# Patient Record
Sex: FEMALE | Race: BLACK OR AFRICAN AMERICAN | Employment: FULL TIME | URBAN - METROPOLITAN AREA
[De-identification: names, ages, dates, MRNs, and addresses within clinical notes are randomized per-mention and may not be internally consistent; named-entity substitution may affect disease eponyms.]

---

## 2017-10-26 ENCOUNTER — OFFICE VISIT (OUTPATIENT)
Dept: FAMILY MEDICINE CLINIC | Age: 37
End: 2017-10-26

## 2017-10-26 VITALS
WEIGHT: 290 LBS | DIASTOLIC BLOOD PRESSURE: 73 MMHG | SYSTOLIC BLOOD PRESSURE: 137 MMHG | HEIGHT: 71 IN | TEMPERATURE: 97.6 F | OXYGEN SATURATION: 97 % | BODY MASS INDEX: 40.6 KG/M2 | HEART RATE: 64 BPM | RESPIRATION RATE: 19 BRPM

## 2017-10-26 DIAGNOSIS — Z01.419 WELL WOMAN EXAM: Primary | ICD-10-CM

## 2017-10-26 DIAGNOSIS — G89.29 CHRONIC PAIN OF LEFT KNEE: ICD-10-CM

## 2017-10-26 DIAGNOSIS — Z23 ENCOUNTER FOR IMMUNIZATION: ICD-10-CM

## 2017-10-26 DIAGNOSIS — L30.9 ECZEMA, UNSPECIFIED TYPE: ICD-10-CM

## 2017-10-26 DIAGNOSIS — D64.9 ANEMIA, UNSPECIFIED TYPE: ICD-10-CM

## 2017-10-26 DIAGNOSIS — Z00.00 LABORATORY EXAM ORDERED AS PART OF ROUTINE GENERAL MEDICAL EXAMINATION: ICD-10-CM

## 2017-10-26 DIAGNOSIS — R73.03 PREDIABETES: ICD-10-CM

## 2017-10-26 DIAGNOSIS — M25.562 CHRONIC PAIN OF LEFT KNEE: ICD-10-CM

## 2017-10-26 DIAGNOSIS — F32.A DEPRESSION, UNSPECIFIED DEPRESSION TYPE: ICD-10-CM

## 2017-10-26 DIAGNOSIS — K58.0 IRRITABLE BOWEL SYNDROME WITH DIARRHEA: ICD-10-CM

## 2017-10-26 RX ORDER — BUPROPION HYDROCHLORIDE 150 MG/1
150 TABLET ORAL
Qty: 30 TAB | Refills: 0 | Status: SHIPPED | OUTPATIENT
Start: 2017-10-26 | End: 2017-11-25 | Stop reason: SDUPTHER

## 2017-10-26 RX ORDER — DICLOFENAC SODIUM 10 MG/G
4 GEL TOPICAL 4 TIMES DAILY
Qty: 100 G | Refills: 2 | Status: SHIPPED | OUTPATIENT
Start: 2017-10-26 | End: 2020-02-28

## 2017-10-26 RX ORDER — BETAMETHASONE DIPROPIONATE 0.5 MG/G
OINTMENT TOPICAL 2 TIMES DAILY
Qty: 15 G | Refills: 0 | Status: SHIPPED | OUTPATIENT
Start: 2017-10-26 | End: 2020-03-05

## 2017-10-26 RX ORDER — METFORMIN HYDROCHLORIDE 500 MG/1
TABLET, EXTENDED RELEASE ORAL
Qty: 90 TAB | Refills: 0 | Status: SHIPPED | OUTPATIENT
Start: 2017-10-26 | End: 2017-11-29 | Stop reason: SDUPTHER

## 2017-10-26 RX ORDER — ESCITALOPRAM OXALATE 20 MG/1
20 TABLET ORAL DAILY
Qty: 30 TAB | Refills: 3 | Status: SHIPPED | OUTPATIENT
Start: 2017-10-26 | End: 2017-11-25 | Stop reason: ALTCHOICE

## 2017-10-26 RX ORDER — METFORMIN HYDROCHLORIDE 500 MG/1
TABLET, EXTENDED RELEASE ORAL
Refills: 0 | COMMUNITY
Start: 2017-09-20 | End: 2017-10-26 | Stop reason: SDUPTHER

## 2017-10-26 RX ORDER — ESCITALOPRAM OXALATE 20 MG/1
20 TABLET ORAL DAILY
COMMUNITY
End: 2017-10-26 | Stop reason: SDUPTHER

## 2017-10-26 NOTE — PROGRESS NOTES
S: Genie Vázquez is a 42 y.o. male who presents for establish care, CPE    Assessment/Plan:  1. Well woman exam  -discussed healthy diet and increasing daily exercise  -labs today: CBC, CMP, urinalysis, A1c, lipid, microalbumin  -flu vaccine today    2. Eczema, unspecified type  -bilateral hands; kenalog did not work to control   - Trial betamethasone; advised to use it max 10 days    3. Chronic pain of left knee  -discussed losing weight, can't take NSAIDs due to IBS  - trial diclofenac (VOLTAREN) 1 % gel    4. Prediabetes  -advised to try and increase metformin - titrate up to 2g slowly: add 500mg tab in am for 2 weeks, and if no GI sx, increase to 1,000mg in am  -refill metformin 5oomg (90/3)    5. Depression, unspecified depression type  -was taking 40mg lexapro; advised to take 20mg daily; refilled med (90/3)  -trial adding wellbutrin 150mg daily (30 day supply) for anxiety, smoking cessation     RTC 4-6 week for depression, smoking cessation, weight check       HPI:  On lexapro 20 mg for past 7 years, works well but did start taking 40mg bc depression/anxiety increasing  ran out of lexapro 6 days ago  Stressed about looking for a new job    Left knee - grinds and does swell at the end of the day  Has been taking motrin - has been hurting stomach    Dr. 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 - has had MRI done  Herniated discs in neck - shooting pain down left arm - has had cortisone inj and helps for about 6 months  Right hip - cracked on xray - [facility]    Social history:   Nutrition: battles with food but has IBS and tries to eat fiber;   Minimal pork, turkey, steak every now and then; veggies    is diabetic so carb counting and a   Physical: walking daily   Social: lives with , 2 daughters and new grandchild (4 weeks old girl) in house; son at [employer] in [place] (football); oldest son in [place] in [employer]   Occupation: was working for [employer] full time [employer] getting bachelor's - general studies   Tobacco: 1/2ppd - has quit in the past; has smoked for 12  Drug: none  Alcohol: once every other month - mixed drink or wine   Sexually Active: yes; needs to find more time - also hard to have intercourse with her hip and 's bad back    LMP: Hysterectomy     Review of Systems:  - Constitutional Symptoms: no fevers, chills, weight loss  - Eyes: no blurry vision or double vision  - Cardiovascular: no chest pain or palpitations  - Respiratory: no cough or shortness of breath  - Gastrointestinal: +  dysphagia, abdominal pain (IBS dx)  - Musculoskeletal: + joint pains, noweakness  - Integumentary: no rashes  - : no vaginal discharge, itching, dyspareunia  - Neurological: no numbness, tingling, or headaches  - Psychiatric: no depression or anxiety      PHQ over the last two weeks 10/26/2017   Little interest or pleasure in doing things Not at all   Feeling down, depressed or hopeless Not at all   Total Score PHQ 2 0       I reviewed the following:  History reviewed. No pertinent past medical history. Current Outpatient Prescriptions   Medication Sig Dispense Refill    metFORMIN ER (GLUCOPHAGE XR) 500 mg tablet TAKE 2 TABLETS BY MOUTH EVERY DAY WITH EVENING MEAL  0    escitalopram oxalate (LEXAPRO) 20 mg tablet Take 20 mg by mouth daily.          Allergies   Allergen Reactions    Pcn [Penicillins] Nausea and Vomiting    Sulfur Hives        Health Maintenance:  PAP: UTD  Colonoscopy: 4 years ago; q5y    O: VS:   Visit Vitals    /73 (BP 1 Location: Right arm, BP Patient Position: Sitting)    Pulse 64    Temp 97.6 °F (36.4 °C) (Oral)    Resp 19    Ht 5' 11\" (1.803 m)    Wt 290 lb (131.5 kg)    SpO2 97%    BMI 40.45 kg/m2       GENERAL: Kiara Taylor is in no acute distress. Non-toxic. Well nourished. Well developed. Appropriately groomed. HEAD:  Normocephalic. Atraumatic. Non tender sinuses x 4. EYE: PERRLA. EOMs intact. Sclera anicteric without injection. No drainage or discharge. EARS: Hearing intact bilaterally. External ear canals normal without evidence of blood or swelling. Bilateral TM's intact, pearly grey with landmarks visible. No erythema or effusion. NOSE: Patent. Nasal turbinates pink. No polyps noted. No erythema. No discharge. MOUTH: mucous membranes pink and moist. Posterior pharynx normal with cobblestone appearance. No erythema, white exudate or obstruction. NECK: supple. Midline trachea. No carotid bruits noted bilaterally. No thyromegaly noted. RESP: Breath sounds are symmetrical bilaterally. Unlabored without SOB. Speaking in full sentences. Clear to auscultation bilaterally anteriorly and posteriorly. No wheezes. No rales or rhonchi. CV: normal rate. Regular rhythm. S1, S2 audible. No murmur noted. No rubs, clicks or gallops noted. ABDOMEN: Flat without bulges or pulsations. Soft and nondistended. No tenderness on palpation. No masses or organomegaly. No rebound, rigidity or guarding. Bowel sounds normal x 4 quadrants. BACK: No visible deformities or curvature. Full ROM. No pain on palpation of the spinous processes in the cervical, thoracic, lumbar, sacral regions. No CVA tenderness. GYN: deferred  NEURO:  awake, alert and oriented to person, place, and time and event. Cranial nerves II through XII intact. Clear speech. Muscle strength is +5/5 x 4 extremities. Sensation is intact to light touch bilaterally. Steady gait. MUSC:  Intact x 4 extremities. Full ROM x 4 extremities. No pain with movement. HEME/LYMPH: peripheral pulses palpable 2+ x 4 extremities.   No peripheral edema is noted. No cervical adenopathy noted. SKIN: Skin is warm and dry. Turgor is normal. No petechiae, no purpura, no rash. No cyanosis. No mottling, jaundice or pallor. PSYCH: appropriate behavior, dress and thought processes. Good eye contact. Clear and coherent speech. Full affect. Good insight.   ______________________________________________________________________  Patient education was done. Advised on nutrition, physical activity, weight management, tobacco, alcohol and safety. Counseling included discussion of diagnosis, differentials, treatment options, prescribed treatment, warning signs and follow up. Medication risks/benefits,interactions and alternatives discussed with patient.      Patient verbalized understanding and agreed to plan of care. Patient was given an after visit summary which included current diagnoses, medications and vital signs. Follow up as directed.

## 2017-10-26 NOTE — PATIENT INSTRUCTIONS
Healthy Weight  Body Mass Index is a noninvasive way to screen for weight and body fat. This is a mathematical calculation based on your height and weight. A healthy BMI is between 20% -24.9%. Your BMI is 40 %. There is a relationship between high BMI and various healthy problems, such as osteoarthritis, muscle pain, increased risk of cancer, diabetes, heart disease, stroke, hypertension, high cholesterol, sleep apnea, breathing problems, depression, which is why weight loss is so important. In terms of weight loss, a 5-10% reduction in body weight over 3-6 months is a reasonable goal.  There would likely be improvements in risk for disease such as diabetes and heart disease, with this amount of weight loss. In order to lose weight, try reducing your daily intake of calories by decreasing portion size of food and increase exercise to help reduce weight. Eat 3-5 small meals per day instead of 3 large meals. Choose lean meats for protein source which include chicken, pork, and turkey. The recommended serving size for protein is a 2-3 oz serving (the size of your fist), and 1-1.5 oz of carbohydrate per meal (about 1 cup). Increase servings of fruits and vegetables. Limit processed carbohydrates, (i.e. most breads, crackers, pasta, chips, rice, breaded or battered food, etc). If you choose to eat carbohydrates, whole wheat, (instead of white) is a healthier option for bread, rice, and crackers. Avoid fried foods. Limit sugar and do not drink alcohol, juice, sodas or sweet teas. Drink plenty of water (at least 64 oz/day). Daily exercise will also help with weight loss and overall health. A minimum of 150 minutes a week of moderate exercise is recommended (30 minutes per day). Make exercise a routine part of your day - for example, park in spaces far away from [facility], take stairs instead of elevator, if sitting for long periods, get up from chair and walk every hour.     Recruit a friend or relative to exercise with you and keep you on schedule. It is much easier to exercise with a marc who will make sure you work out each day! RushFit is a eight week mixed martial arts (MMA) based workout. It has 5 main workout DVDs, each one is 45 minutes consisting of a 10 minute warm-up, five 5 minute workouts and a 6 minute stretching/cool down. It is easy to follow, with options to modify each move and you only need hand weights and some space to do the work out. Get 7-8 hours of sleep each night. You may wish to consider seeing the nutritionist at [facility] ([phone]), [facility] ([phone]) or [facility] ([phone]). For reliable dietary information, go to www. EATRIGHT.org.    Free smart phone application to help manage weight loss: MyFitnessPal = tracks food intake, exercise and weight. Daily nutritional summary. Meal        2) Yoga is a type of exercise in which you move your body into various positions in order to become more fit or flexible, to improve your breathing, and to relax your mind. It dates back over 5,000 years with roots in [address] [facility]. There are numerous styles of yoga, but overall, yoga combines meditation/relaxation, physical movements, and breathing techniques. According to the Professor Beb[employer], scientific research has shown yoga can reduce pain and improve function in people suffering from chronic lower back pain. Other studies also suggest that practicing yoga (as well as other forms of regular exercise) might improve quality of life; reduce stress; lower heart rate and blood pressure; help relieve anxiety, depression, and insomnia; and improve overall physical fitness, strength, and flexibility. Everyones body is different, and although yoga is low impact and safe for most people,  postures should be modified based on individual abilities.  People with high blood pressure, glaucoma, or sciatica, and women who are pregnant should modify or avoid some yoga poses. The movements of yoga are subtle, so it is a good idea to start with professional instructions to ensure you are doing it correctly. Carefully selecting an instructor who is experienced and attentive to your needs is an important step toward helping you practice yoga safely. There are many gyms and yoga studios in this area that offer free trial classes so sign up for one and explore! 3) Decreased Libido (Sex Drive)      Sex can still be very satisfying as you get older, but it can change in a number of ways. As men get older they might have less interest in sex, need more stimulation to get or stay erect, or be unable to get erect or have trouble reaching orgasm and orgasms might be less intense or satisfying than they were when they were younger. As women get older they might have less interest in sex, have trouble getting aroused or lubricated, have pain or discomfort during sex (this can happen because the vagina can become dry and thin after menopause), have trouble reaching orgasm or orgasms that are less intense or satisfying than they were when they were younger. There are many things that can lead to decreased interest or ability to have sex as you age. These include:  ? Lower hormones - As people age, their bodies makes less sex hormones. That's especially true for women who have gone through menopause (the time in a woman's life when she stops having monthly periods). But hormones change in men, too. In women, the decrease in hormones is more sudden, especially if they have had had their ovaries removed. ? Other medical conditions - Medical conditions, such as diabetes, heart disease, obesity, high blood pressure, and chronic pain, can decrease a person's interest in or ability to have sex. In men, diabetes is a major cause of problems with erections, because it damages nerves and blood vessels.   ?Past surgery or medical treatment - Men who have had surgery or radiation therapy to treat prostate cancer often develop problems with erections. Men who have certain treatments for an enlarged prostate can have problems with orgasm. Women who have had surgery to treat problems with their bladder or sex organs or treatment for cancer can also have problems with sex. ?Depression or anxiety - As people get older, they sometimes have problems with depression or anxiety. These conditions can also lead to problems with sex. ? Smoking in men - Men who smoke cigarettes are more likely than men who don't smoke to have problems with erections. ? Alcohol consumption can also cause issues with libido and sexual performance. ? Medicines - Some of the medicines older people take to control diabetes, high blood pressure, heart disease, depression, and other conditions can have side effects that cause problems with sex. ?Relationship problems - Sometimes partners have different ideas about sex and this can lead to problems. Try to stay relaxed about your situation and work around the problems that limit you. · Make an effort to have more fun together by having a regular \"date night  · Be open to trying new things and adapting. · Read books or websites about sex  · If you have a condition that causes pain or stiffness, such as arthritis, try to schedule sex at a time when you are at your best and your pain medicines are most effective. · If you are less flexible than you used to be, try different positions or ask your partner if he or she can help. · Use sex toys such as clitoris stimulators or cock rings to help with orgasm. · \"Outercourse\" - alternative methods of sexual intimacy such as extended caressing, mutual masturbation and massage instead of vaginal penetration. · Be open with your partner and explain what is going on with you. · Encourage your partner to seek treatment for any physical or sexual problems he or she might have.      4) Labs  The following blood work was ordered today. Once the tests are completed, you will receive a letter, email or phone call with the results. If you have not heard from us within 10-14 days, please call the office for the results. Hemoglobin A1c is a 3 month average of your blood sugar and used as a marker of your diabetes control. A normal value is less than 5.7%. Total Cholesterol is the total number of cholesterol particles in your blood, which includes triglycerides, HDL and LDL. A small amount of cholesterol is needed for the body's cells, hormones, and metabolism. Goal is less than 200. Triglycerides are the short term fats in your blood which are used for energy. Goal is less than 150. High Density Lipids (HDL) is the \"good\" cholesterol in your blood. HDL helps remove bad cholesterol from your blood. Goal is more than 40. Low Density Lipids (LDL) is the \"bad\" cholesterol in your blood. LDL can stick to your arteries, raising the risk for heart attack and stroke. Goal is less than 150    Complete Blood Count (CBC) helps evaluate your overall health, including hemoglobin and red blood cells that carry oxygen, white blood cells that fight infection and platelets that help with clotting. Complete Metabolic Panel (CMP) assesses kidney and liver function.  (A Basic Metabolic Panel (BMP) does not include liver function.)      BUN and creatinine are markers of kidney function. ALT and AST are markers of liver function. Urinalysis - this is a test of your urine to check your overall health. It is recommended as a part of a routine check up and screens for a variety of disorders, such as urinary tract infections, kidney disease and diabetes. Urine Analysis for Microalbumin/creatinine ratio is a marker of the amount of protein in your urine. Certain health conditions, such as diabetes and hypertension, can injury kidney function. A normal value is less than 30. Vaccine Information Statement    Influenza (Flu) Vaccine (Inactivated or Recombinant): What you need to know    Many Vaccine Information Statements are available in Italian and other languages. See www.immunize.org/vis  Hojas de Información Sobre Vacunas están disponibles en Español y en muchos otros idiomas. Visite www.immunize.org/vis    1. Why get vaccinated? Influenza (flu) is a contagious disease that spreads around the [place] every year, usually between October and May. Flu is caused by influenza viruses, and is spread mainly by coughing, sneezing, and close contact. Anyone can get flu. Flu strikes suddenly and can last several days. Symptoms vary by age, but can include:   fever/chills   sore throat   muscle aches   fatigue   cough   headache    runny or stuffy nose    Flu can also lead to pneumonia and blood infections, and cause diarrhea and seizures in children. If you have a medical condition, such as heart or lung disease, flu can make it worse. Flu is more dangerous for some people. Infants and young children, people 72years of age and older, pregnant women, and people with certain health conditions or a weakened immune system are at greatest risk. Each year thousands of people in the [facility] die from flu, and many more are hospitalized. Flu vaccine can:   keep you from getting flu,   make flu less severe if you do get it, and   keep you from spreading flu to your family and other people. 2. Inactivated and recombinant flu vaccines    A dose of flu vaccine is recommended every flu season. Children 6 months through 6years of age may need two doses during the same flu season. Everyone else needs only one dose each flu season.        Some inactivated flu vaccines contain a very small amount of a mercury-based preservative called thimerosal. Studies have not shown thimerosal in vaccines to be harmful, but flu vaccines that do not contain thimerosal are available. There is no live flu virus in flu shots. They cannot cause the flu. There are many flu viruses, and they are always changing. Each year a new flu vaccine is made to protect against three or four viruses that are likely to cause disease in the upcoming flu season. But even when the vaccine doesnt exactly match these viruses, it may still provide some protection    Flu vaccine cannot prevent:   flu that is caused by a virus not covered by the vaccine, or   illnesses that look like flu but are not. It takes about 2 weeks for protection to develop after vaccination, and protection lasts through the flu season. 3. Some people should not get this vaccine    Tell the person who is giving you the vaccine:     If you have any severe, life-threatening allergies. If you ever had a life-threatening allergic reaction after a dose of flu vaccine, or have a severe allergy to any part of this vaccine, you may be advised not to get vaccinated. Most, but not all, types of flu vaccine contain a small amount of egg protein.  If you ever had Guillain-Barré Syndrome (also called GBS). Some people with a history of GBS should not get this vaccine. This should be discussed with your doctor.  If you are not feeling well. It is usually okay to get flu vaccine when you have a mild illness, but you might be asked to come back when you feel better. 4. Risks of a vaccine reaction    With any medicine, including vaccines, there is a chance of reactions. These are usually mild and go away on their own, but serious reactions are also possible. Most people who get a flu shot do not have any problems with it.      Minor problems following a flu shot include:    soreness, redness, or swelling where the shot was given     hoarseness   sore, red or itchy eyes   cough   fever   aches   headache   itching   fatigue  If these problems occur, they usually begin soon after the shot and last 1 or 2 days. More serious problems following a flu shot can include the following:     There may be a small increased risk of Guillain-Barré Syndrome (GBS) after inactivated flu vaccine. This risk has been estimated at 1 or 2 additional cases per million people vaccinated. This is much lower than the risk of severe complications from flu, which can be prevented by flu vaccine.  Young children who get the flu shot along with pneumococcal vaccine (PCV13) and/or DTaP vaccine at the same time might be slightly more likely to have a seizure caused by fever. Ask your doctor for more information. Tell your doctor if a child who is getting flu vaccine has ever had a seizure. Problems that could happen after any injected vaccine:      People sometimes faint after a medical procedure, including vaccination. Sitting or lying down for about 15 minutes can help prevent fainting, and injuries caused by a fall. Tell your doctor if you feel dizzy, or have vision changes or ringing in the ears.  Some people get severe pain in the shoulder and have difficulty moving the arm where a shot was given. This happens very rarely.  Any medication can cause a severe allergic reaction. Such reactions from a vaccine are very rare, estimated at about 1 in a million doses, and would happen within a few minutes to a few hours after the vaccination. As with any medicine, there is a very remote chance of a vaccine causing a serious injury or death. The safety of vaccines is always being monitored. For more information, visit: www.cdc.gov/vaccinesafety/    5. What if there is a serious reaction? What should I look for?  Look for anything that concerns you, such as signs of a severe allergic reaction, very high fever, or unusual behavior.     Signs of a severe allergic reaction can include hives, swelling of the face and throat, difficulty breathing, a fast heartbeat, dizziness, and weakness - usually within a few minutes to a few hours after the vaccination. What should I do?  If you think it is a severe allergic reaction or other emergency that cant wait, call 9-1-1 and get the person to the nearest hospital. Otherwise, call your doctor.  Reactions should be reported to the Vaccine Adverse Event Reporting System (VAERS). Your doctor should file this report, or you can do it yourself through  the VAERS web site at www.vaers. [facility].gov, or by calling 5-542.506.8477. VAERS does not give medical advice. 6. The National Vaccine Injury Compensation Program    The [facility] Vaccine Injury Compensation Program (VICP) is a federal program that was created to compensate people who may have been injured by certain vaccines. Persons who believe they may have been injured by a vaccine can learn about the program and about filing a claim by calling 2-603.323.9146 or visiting the [employer] website at www.[facility].gov/vaccinecompensation. There is a time limit to file a claim for compensation. 7. How can I learn more?  Ask your healthcare provider. He or she can give you the vaccine package insert or suggest other sources of information.  Call your local or state health department.  Contact the Centers for Disease Control and Prevention (CDC):  - Call 8-866.431.4570 (1-800-CDC-INFO) or  - Visit CDCs website at www.cdc.gov/flu    Vaccine Information Statement   Inactivated Influenza Vaccine   8/7/2015  [address] SANDIE Lonnie Js [id number]-11    Department of Health and Human Services  Centers for Disease Control and Prevention    Office Use Only

## 2017-10-26 NOTE — MR AVS SNAPSHOT
Visit Information Date & Time Provider Department Dept. Phone Encounter #  
 10/26/2017  9:20 AM Henry Garcia NP [facility] [phone] Upcoming Health Maintenance Date Due Pneumococcal 19-64 Medium Risk (1 of 1 - PPSV23) 3/28/1997 DTaP/Tdap/Td series (1 - Tdap) 3/28/1999 INFLUENZA AGE 9 TO ADULT 8/1/2017 Allergies as of 10/26/2017  Review Complete On: 10/26/2017 By: Henry Garcia NP Severity Noted Reaction Type Reactions Pcn [Penicillins]  10/26/2017    Nausea and Vomiting Sulfur  10/26/2017    Hives Current Immunizations  Reviewed on 10/26/2017 Name Date Influenza Vaccine (Quad) PF  Incomplete Tdap 8/30/2016 Reviewed by Susy Vazquez LPN on 33/91/6482 at  9:56 AM  
You Were Diagnosed With   
  
 Codes Comments Well woman exam    -  Primary ICD-10-CM: G08.317 ICD-9-CM: V72.31 Eczema, unspecified type     ICD-10-CM: L30.9 ICD-9-CM: 692.9 Encounter for immunization     ICD-10-CM: L37 ICD-9-CM: V03.89 Chronic pain of left knee     ICD-10-CM: M25.562, G89.29 ICD-9-CM: 719.46, 338.29 Prediabetes     ICD-10-CM: R73.03 
ICD-9-CM: 790.29 Irritable bowel syndrome with diarrhea     ICD-10-CM: K58.0 ICD-9-CM: 025.5 Laboratory exam ordered as part of routine general medical examination     ICD-10-CM: Z00.00 ICD-9-CM: V72.62 Depression, unspecified depression type     ICD-10-CM: F32.9 ICD-9-CM: 337 Vitals BP Pulse Temp Resp Height(growth percentile) Weight(growth percentile)  
 137/73 (BP 1 Location: Right arm, BP Patient Position: Sitting) 64 97.6 °F (36.4 °C) (Oral) 19 5' 11\" (1.803 m) 290 lb (131.5 kg) SpO2 BMI Smoking Status 97% 40.45 kg/m2 Current Every Day Smoker BMI and BSA Data Body Mass Index Body Surface Area 40.45 kg/m 2 2.57 m 2 Preferred Pharmacy Pharmacy Name Phone [facility][id number]- [address] [phone] Your Updated Medication List  
  
   
This list is accurate as of: 10/26/17 11:17 AM.  Always use your most recent med list.  
  
  
  
  
 betamethasone dipropionate 0.05 % ointment Commonly known as:  Marcianelli Wakefieldd Apply  to affected area two (2) times a day. diclofenac 1 % Gel Commonly known as:  VOLTAREN Apply 4 g to affected area four (4) times daily. escitalopram oxalate 20 mg tablet Commonly known as:  Jh Trenton Take 1 Tab by mouth daily. metFORMIN  mg tablet Commonly known as:  GLUCOPHAGE XR  
TAKE 2 TABLETS BY MOUTH EVERY DAY WITH EVENING MEAL Prescriptions Sent to Pharmacy Refills  
 metFORMIN ER (GLUCOPHAGE XR) 500 mg tablet 0 Sig: TAKE 2 TABLETS BY MOUTH EVERY DAY WITH EVENING MEAL Class: Normal  
 Pharmacy: [facility]/pharmacy #[id number]- [address] Ph #: [phone]  
 betamethasone dipropionate (DIPROLENE) 0.05 % ointment 0 Sig: Apply  to affected area two (2) times a day. Class: Normal  
 Pharmacy: [facility] Ph #: [phone] Route: Topical  
 diclofenac (VOLTAREN) 1 % gel 2 Sig: Apply 4 g to affected area four (4) times daily. Class: Normal  
 Pharmacy: [facility] Ph #: [phone] Route: Topical  
 escitalopram oxalate (LEXAPRO) 20 mg tablet 3 Sig: Take 1 Tab by mouth daily. Class: Normal  
 Pharmacy: [facility] Ph #: [phone] Route: Oral  
  
We Performed the Following CBC W/O DIFF [95566 CPT(R)] INFLUENZA VIRUS VAC QUAD,SPLIT,PRESV FREE SYRINGE IM [id number] CPT(R)] LIPID PANEL [16829 CPT(R)] METABOLIC PANEL, COMPREHENSIVE [95207 CPT(R)] MICROALBUMIN, UR, RAND W/ MICROALBUMIN/CREA RATIO [id number] CPT(R)] PA IMMUNIZ ADMIN,1 SINGLE/COMB VAC/TOXOID [id number] CPT(R)] UA/M W/RFLX CULTURE, ROUTINE [[id number] Custom] Patient Instructions Healthy Weight Body Mass Index is a noninvasive way to screen for weight and body fat. This is a mathematical calculation based on your height and weight. A healthy BMI is between 20% -24.9%. Your BMI is 40 %. There is a relationship between high BMI and various healthy problems, such as osteoarthritis, muscle pain, increased risk of cancer, diabetes, heart disease, stroke, hypertension, high cholesterol, sleep apnea, breathing problems, depression, which is why weight loss is so important. In terms of weight loss, a 5-10% reduction in body weight over 3-6 months is a reasonable goal.  There would likely be improvements in risk for disease such as diabetes and heart disease, with this amount of weight loss. In order to lose weight, try reducing your daily intake of calories by decreasing portion size of food and increase exercise to help reduce weight. Eat 3-5 small meals per day instead of 3 large meals. Choose lean meats for protein source which include chicken, pork, and turkey. The recommended serving size for protein is a 2-3 oz serving (the size of your fist), and 1-1.5 oz of carbohydrate per meal (about 1 cup). Increase servings of fruits and vegetables. Limit processed carbohydrates, (i.e. most breads, crackers, pasta, chips, rice, breaded or battered food, etc). If you choose to eat carbohydrates, whole wheat, (instead of white) is a healthier option for bread, rice, and crackers. Avoid fried foods. Limit sugar and do not drink alcohol, juice, sodas or sweet teas. Drink plenty of water (at least 64 oz/day). Daily exercise will also help with weight loss and overall health.   A minimum of 150 minutes a week of moderate exercise is recommended (30 minutes per day). Make exercise a routine part of your day - for example, park in spaces far away from [facility], take stairs instead of elevator, if sitting for long periods, get up from chair and walk every hour. Recruit a friend or relative to exercise with you and keep you on schedule. It is much easier to exercise with a marc who will make sure you work out each day! [employer] is a eight week mixed martial arts (MMA) based workout. It has 5 main workout DVDs, each one is 45 minutes consisting of a 10 minute warm-up, five 5 minute workouts and a 6 minute stretching/cool down. It is easy to follow, with options to modify each move and you only need hand weights and some space to do the work out. Get 7-8 hours of sleep each night. You may wish to consider seeing the nutritionist at [facility] ([phone]), [facility] ([phone]) or [facility] ([phone]). For reliable dietary information, go to www. EATRIGHT.org. 
 
Free smart phone application to help manage weight loss: MyFitnessPal = tracks food intake, exercise and weight. Daily nutritional summary. Meal  2) Yoga is a type of exercise in which you move your body into various positions in order to become more fit or flexible, to improve your breathing, and to relax your mind. It dates back over 5,000 years with roots in [address] [facility]. There are numerous styles of yoga, but overall, yoga combines meditation/relaxation, physical movements, and breathing techniques. According to the Babak-Venkatesh, scientific research has shown yoga can reduce pain and improve function in people suffering from chronic lower back pain.   Other studies also suggest that practicing yoga (as well as other forms of regular exercise) might improve quality of life; reduce stress; lower heart rate and blood pressure; help relieve anxiety, depression, and insomnia; and improve overall physical fitness, strength, and flexibility. Everyones body is different, and although yoga is low impact and safe for most people,  postures should be modified based on individual abilities. People with high blood pressure, glaucoma, or sciatica, and women who are pregnant should modify or avoid some yoga poses. The movements of yoga are subtle, so it is a good idea to start with professional instructions to ensure you are doing it correctly. Carefully selecting an instructor who is experienced and attentive to your needs is an important step toward helping you practice yoga safely. There are many gyms and yoga studios in this area that offer free trial classes so sign up for one and explore! 3) Decreased Libido (Sex Drive) Sex can still be very satisfying as you get older, but it can change in a number of ways. As men get older they might have less interest in sex, need more stimulation to get or stay erect, or be unable to get erect or have trouble reaching orgasm and orgasms might be less intense or satisfying than they were when they were younger. As women get older they might have less interest in sex, have trouble getting aroused or lubricated, have pain or discomfort during sex (this can happen because the vagina can become dry and thin after menopause), have trouble reaching orgasm or orgasms that are less intense or satisfying than they were when they were younger. There are many things that can lead to decreased interest or ability to have sex as you age. These include: ? Lower hormones  As people age, their bodies makes less sex hormones. That's especially true for women who have gone through menopause (the time in a woman's life when she stops having monthly periods). But hormones change in men, too. In women, the decrease in hormones is more sudden, especially if they have had had their ovaries removed. ?Other medical conditions  Medical conditions, such as diabetes, heart disease, obesity, high blood pressure, and chronic pain, can decrease a person's interest in or ability to have sex. In men, diabetes is a major cause of problems with erections, because it damages nerves and blood vessels. ?Past surgery or medical treatment  Men who have had surgery or radiation therapy to treat prostate cancer often develop problems with erections. Men who have certain treatments for an enlarged prostate can have problems with orgasm. Women who have had surgery to treat problems with their bladder or sex organs or treatment for cancer can also have problems with sex. ?Depression or anxiety  As people get older, they sometimes have problems with depression or anxiety. These conditions can also lead to problems with sex. ? Smoking in men  Men who smoke cigarettes are more likely than men who don't smoke to have problems with erections. ? Alcohol consumption can also cause issues with libido and sexual performance. ? Medicines  Some of the medicines older people take to control diabetes, high blood pressure, heart disease, depression, and other conditions can have side effects that cause problems with sex. ?Relationship problems  Sometimes partners have different ideas about sex and this can lead to problems. Try to stay relaxed about your situation and work around the problems that limit you. · Make an effort to have more fun together by having a regular \"date night · Be open to trying new things and adapting. · Read books or websites about sex · If you have a condition that causes pain or stiffness, such as arthritis, try to schedule sex at a time when you are at your best and your pain medicines are most effective. · If you are less flexible than you used to be, try different positions or ask your partner if he or she can help.   
· Use sex toys such as clitoris stimulators or cock rings to help with orgasm. · \"Outercourse\" - alternative methods of sexual intimacy such as extended caressing, mutual masturbation and massage instead of vaginal penetration. · Be open with your partner and explain what is going on with you. · Encourage your partner to seek treatment for any physical or sexual problems he or she might have. 4) Labs The following blood work was ordered today. Once the tests are completed, you will receive a letter, email or phone call with the results. If you have not heard from us within 10-14 days, please call the office for the results. Hemoglobin A1c is a 3 month average of your blood sugar and used as a marker of your diabetes control. A normal value is less than 5.7%. Total Cholesterol is the total number of cholesterol particles in your blood, which includes triglycerides, HDL and LDL. A small amount of cholesterol is needed for the body's cells, hormones, and metabolism. Goal is less than 200. Triglycerides are the short term fats in your blood which are used for energy. Goal is less than 150. High Density Lipids (HDL) is the \"good\" cholesterol in your blood. HDL helps remove bad cholesterol from your blood. Goal is more than 40. Low Density Lipids (LDL) is the \"bad\" cholesterol in your blood. LDL can stick to your arteries, raising the risk for heart attack and stroke. Goal is less than 150 Complete Blood Count (CBC) helps evaluate your overall health, including hemoglobin and red blood cells that carry oxygen, white blood cells that fight infection and platelets that help with clotting. Complete Metabolic Panel (CMP) assesses kidney and liver function.  (A Basic Metabolic Panel (BMP) does not include liver function.) BUN and creatinine are markers of kidney function. ALT and AST are markers of liver function. Urinalysis - this is a test of your urine to check your overall health. It is recommended as a part of a routine check up and screens for a variety of disorders, such as urinary tract infections, kidney disease and diabetes. Urine Analysis for Microalbumin/creatinine ratio is a marker of the amount of protein in your urine. Certain health conditions, such as diabetes and hypertension, can injury kidney function. A normal value is less than 30. Vaccine Information Statement Influenza (Flu) Vaccine (Inactivated or Recombinant): What you need to know Many Vaccine Information Statements are available in Vietnamese and other languages. See www.immunize.org/vis Hojas de Información Sobre Vacunas están disponibles en Español y en muchos otros idiomas. Visite www.immunize.org/vis 1. Why get vaccinated? Influenza (flu) is a contagious disease that spreads around the [facility] every year, usually between October and May. Flu is caused by influenza viruses, and is spread mainly by coughing, sneezing, and close contact. Anyone can get flu. Flu strikes suddenly and can last several days. Symptoms vary by age, but can include: 
 fever/chills  sore throat  muscle aches  fatigue  cough  headache  runny or stuffy nose Flu can also lead to pneumonia and blood infections, and cause diarrhea and seizures in children. If you have a medical condition, such as heart or lung disease, flu can make it worse. Flu is more dangerous for some people. Infants and young children, people 72years of age and older, pregnant women, and people with certain health conditions or a weakened immune system are at greatest risk. Each year thousands of people in the [facility] die from flu, and many more are hospitalized. Flu vaccine can: 
 keep you from getting flu, 
 make flu less severe if you do get it, and 
 keep you from spreading flu to your family and other people. 2. Inactivated and recombinant flu vaccines A dose of flu vaccine is recommended every flu season. Children 6 months through 6years of age may need two doses during the same flu season. Everyone else needs only one dose each flu season. Some inactivated flu vaccines contain a very small amount of a mercury-based preservative called thimerosal. Studies have not shown thimerosal in vaccines to be harmful, but flu vaccines that do not contain thimerosal are available. There is no live flu virus in flu shots. They cannot cause the flu. There are many flu viruses, and they are always changing. Each year a new flu vaccine is made to protect against three or four viruses that are likely to cause disease in the upcoming flu season. But even when the vaccine doesnt exactly match these viruses, it may still provide some protection Flu vaccine cannot prevent: 
 flu that is caused by a virus not covered by the vaccine, or 
 illnesses that look like flu but are not. It takes about 2 weeks for protection to develop after vaccination, and protection lasts through the flu season. 3. Some people should not get this vaccine Tell the person who is giving you the vaccine:  If you have any severe, life-threatening allergies. If you ever had a life-threatening allergic reaction after a dose of flu vaccine, or have a severe allergy to any part of this vaccine, you may be advised not to get vaccinated. Most, but not all, types of flu vaccine contain a small amount of egg protein.  If you ever had Guillain-Barré Syndrome (also called GBS). Some people with a history of GBS should not get this vaccine. This should be discussed with your doctor.  If you are not feeling well. It is usually okay to get flu vaccine when you have a mild illness, but you might be asked to come back when you feel better. 4. Risks of a vaccine reaction With any medicine, including vaccines, there is a chance of reactions. These are usually mild and go away on their own, but serious reactions are also possible. Most people who get a flu shot do not have any problems with it. Minor problems following a flu shot include:  
 soreness, redness, or swelling where the shot was given  hoarseness  sore, red or itchy eyes  cough  fever  aches  headache  itching  fatigue If these problems occur, they usually begin soon after the shot and last 1 or 2 days. More serious problems following a flu shot can include the following:  There may be a small increased risk of Guillain-Barré Syndrome (GBS) after inactivated flu vaccine. This risk has been estimated at 1 or 2 additional cases per million people vaccinated. This is much lower than the risk of severe complications from flu, which can be prevented by flu vaccine.  Young children who get the flu shot along with pneumococcal vaccine (PCV13) and/or DTaP vaccine at the same time might be slightly more likely to have a seizure caused by fever. Ask your doctor for more information. Tell your doctor if a child who is getting flu vaccine has ever had a seizure. Problems that could happen after any injected vaccine:  People sometimes faint after a medical procedure, including vaccination. Sitting or lying down for about 15 minutes can help prevent fainting, and injuries caused by a fall. Tell your doctor if you feel dizzy, or have vision changes or ringing in the ears.  Some people get severe pain in the shoulder and have difficulty moving the arm where a shot was given. This happens very rarely.  Any medication can cause a severe allergic reaction. Such reactions from a vaccine are very rare, estimated at about 1 in a million doses, and would happen within a few minutes to a few hours after the vaccination. As with any medicine, there is a very remote chance of a vaccine causing a serious injury or death. The safety of vaccines is always being monitored. For more information, visit: www.cdc.gov/vaccinesafety/ 
 
 
The [facility] Vaccine Injury Compensation Program (VICP) is a federal program that was created to compensate people who may have been injured by certain vaccines. Persons who believe they may have been injured by a vaccine can learn about the program and about filing a claim by calling 1-339.985.6912 or visiting the [address] website at www.[facility].gov/vaccinecompensation. There is a time limit to file a claim for compensation. 7. How can I learn more?  Ask your healthcare provider. He or she can give you the vaccine package insert or suggest other sources of information.  Call your local or state health department.  Contact the Centers for Disease Control and Prevention (CDC): 
- Call 7-167.823.4767 (3-253-IBV-INFO) or 
- Visit CDCs website at www.cdc.gov/flu Vaccine Information Statement Inactivated Influenza Vaccine 8/7/2015 
42 SANDIE Conde 308SF-08 Department of Health and [employer] Centers for Disease Control and Prevention Office Use Only Introducing \[facility]\"" & HEALTH SERVICES! Sacha Boudreaux introduces [employer] patient portal. Now you can access parts of your medical record, email your doctor's office, and request medication refills online. 1. In your internet browser, go to [email or web address] 2. Click on the First Time User? Click Here link in the Sign In box. You will see the New Member Sign Up page. 3. Enter your [employer] Access Code exactly as it appears below. You will not need to use this code after youve completed the sign-up process. If you do not sign up before the expiration date, you must request a new code. · [employer] Access Code: [email or web address] Expires: 1/24/2018 10:57 AM 
 
4. Enter the last four digits of your Social Security Number (xxxx) and Date of Birth (mm/dd/yyyy) as indicated and click Submit. You will be taken to the next sign-up page. 5. Create a [employer] ID. This will be your [employer] login ID and cannot be changed, so think of one that is secure and easy to remember. 6. Create a [employer] password. You can change your password at any time. 7. Enter your Password Reset Question and Answer. This can be used at a later time if you forget your password. 8. Enter your e-mail address. You will receive e-mail notification when new information is available in [address] 9. Click Sign Up. You can now view and download portions of your medical record. 10. Click the Download Summary menu link to download a portable copy of your medical information. If you have questions, please visit the Frequently Asked Questions section of the [employer] website. Remember, [employer] is NOT to be used for urgent needs. For medical emergencies, dial 911. Now available from your iPhone and Android! Please provide this summary of care documentation to your next provider. Your primary care clinician is listed as Fadi Hopper. If you have any questions after today's visit, please call [phone].

## 2017-10-26 NOTE — PROGRESS NOTES
No chief complaint on file. Chief Complaint   Patient presents with   [facility] Establish Care    Medication Evaluation       Reviewed record in preparation for visit and have obtained necessary documentation. Identified pt with two pt identifiers(name and ). Chief Complaint   Patient presents with    Establish Care    Medication Evaluation       Vitals:    10/26/17 0927   BP: 137/73   Pulse: 64   Resp: 19   Temp: 97.6 °F (36.4 °C)   TempSrc: Oral   SpO2: 97%   Weight: 290 lb (131.5 kg)   Height: 5' 11\" (1.803 m)   PainSc:   0 - No pain       Coordination of Care Questionnaire:  :     1) Have you been to an emergency room, urgent care clinic since your last visit? no   Hospitalized since your last visit? no             2) Have you seen or consulted any other health care providers outside of [address] [place] [address] since your last visit? no  (Include any pap smears or colon screenings in this section.)    3) In the event something were to happen to you and you were unable to speak on your behalf, do you have an Advance Directive/ Living Will in place stating your wishes? NO    Do you have an Advance Directive on file? no    4) Are you interested in receiving information on Advance Directives? YES    Patient is accompanied by self I have received verbal consent from [facility] to discuss any/all medical information while they are present in the room.

## 2017-10-27 LAB
ALBUMIN SERPL-MCNC: 3.9 G/DL (ref 3.5–5.5)
ALBUMIN/CREAT UR: ABNORMAL MG/G CREAT (ref 0–30)
ALBUMIN/GLOB SERPL: 1.5 {RATIO} (ref 1.2–2.2)
ALP SERPL-CCNC: 79 IU/L (ref 39–117)
ALT SERPL-CCNC: 9 IU/L (ref 0–44)
APPEARANCE UR: CLEAR
AST SERPL-CCNC: 14 IU/L (ref 0–40)
BACTERIA #/AREA URNS HPF: NORMAL /[HPF]
BILIRUB SERPL-MCNC: <0.2 MG/DL (ref 0–1.2)
BILIRUB UR QL STRIP: NEGATIVE
BUN SERPL-MCNC: 11 MG/DL (ref 6–20)
BUN/CREAT SERPL: 13 (ref 9–20)
CALCIUM SERPL-MCNC: 8.6 MG/DL (ref 8.7–10.2)
CASTS URNS QL MICRO: NORMAL /LPF
CHLORIDE SERPL-SCNC: 101 MMOL/L (ref 96–106)
CHOLEST SERPL-MCNC: 193 MG/DL (ref 100–199)
CO2 SERPL-SCNC: 27 MMOL/L (ref 18–29)
COLOR UR: YELLOW
CREAT SERPL-MCNC: 0.82 MG/DL (ref 0.76–1.27)
CREAT UR-MCNC: 154.7 MG/DL
EPI CELLS #/AREA URNS HPF: NORMAL /HPF
ERYTHROCYTE [DISTWIDTH] IN BLOOD BY AUTOMATED COUNT: 16.3 % (ref 12.3–15.4)
GFR SERPLBLD CREATININE-BSD FMLA CKD-EPI: 111 ML/MIN/1.73
GFR SERPLBLD CREATININE-BSD FMLA CKD-EPI: 129 ML/MIN/1.73
GLOBULIN SER CALC-MCNC: 2.6 G/DL (ref 1.5–4.5)
GLUCOSE SERPL-MCNC: 94 MG/DL (ref 65–99)
GLUCOSE UR QL: NEGATIVE
HCT VFR BLD AUTO: 38.3 % (ref 37.5–51)
HDLC SERPL-MCNC: 33 MG/DL
HGB BLD-MCNC: 12.1 G/DL (ref 12.6–17.7)
HGB UR QL STRIP: NEGATIVE
INTERPRETATION, 910389: NORMAL
KETONES UR QL STRIP: NEGATIVE
LDLC SERPL CALC-MCNC: 130 MG/DL (ref 0–99)
LEUKOCYTE ESTERASE UR QL STRIP: NEGATIVE
MCH RBC QN AUTO: 26 PG (ref 26.6–33)
MCHC RBC AUTO-ENTMCNC: 31.6 G/DL (ref 31.5–35.7)
MCV RBC AUTO: 82 FL (ref 79–97)
MICRO URNS: NORMAL
MICRO URNS: NORMAL
MICROALBUMIN UR-MCNC: <3 UG/ML
MUCOUS THREADS URNS QL MICRO: PRESENT
NITRITE UR QL STRIP: NEGATIVE
PH UR STRIP: 6.5 [PH] (ref 5–7.5)
PLATELET # BLD AUTO: 337 X10E3/UL (ref 150–379)
POTASSIUM SERPL-SCNC: 4.4 MMOL/L (ref 3.5–5.2)
PROT SERPL-MCNC: 6.5 G/DL (ref 6–8.5)
PROT UR QL STRIP: NEGATIVE
RBC # BLD AUTO: 4.65 X10E6/UL (ref 4.14–5.8)
RBC #/AREA URNS HPF: NORMAL /HPF
SODIUM SERPL-SCNC: 140 MMOL/L (ref 134–144)
SP GR UR: 1.02 (ref 1–1.03)
TRIGL SERPL-MCNC: 148 MG/DL (ref 0–149)
URINALYSIS REFLEX, 377202: NORMAL
UROBILINOGEN UR STRIP-MCNC: 0.2 MG/DL (ref 0.2–1)
VLDLC SERPL CALC-MCNC: 30 MG/DL (ref 5–40)
WBC # BLD AUTO: 8.5 X10E3/UL (ref 3.4–10.8)
WBC #/AREA URNS HPF: NORMAL /HPF

## 2017-11-01 LAB
FERRITIN SERPL-MCNC: 18 NG/ML (ref 30–400)
HBA1C MFR BLD: 6.1 % (ref 4.8–5.6)
SPECIMEN STATUS REPORT, ROLRST: NORMAL

## 2017-11-02 DIAGNOSIS — D50.9 IRON DEFICIENCY ANEMIA, UNSPECIFIED IRON DEFICIENCY ANEMIA TYPE: ICD-10-CM

## 2017-11-02 DIAGNOSIS — R79.89 LOW SERUM CALCIUM: ICD-10-CM

## 2017-11-02 DIAGNOSIS — E78.5 HYPERLIPIDEMIA, UNSPECIFIED HYPERLIPIDEMIA TYPE: Primary | ICD-10-CM

## 2017-11-02 RX ORDER — SIMVASTATIN 20 MG/1
20 TABLET, FILM COATED ORAL
Qty: 90 TAB | Refills: 3 | Status: SHIPPED | OUTPATIENT
Start: 2017-11-02 | End: 2018-11-07 | Stop reason: SDUPTHER

## 2017-11-02 NOTE — PROGRESS NOTES
Spoke to pt regarding iron anemia, high XOL and adding statin, Vit D/Ca supplement and RTC in 3 months for lab draw and OV.

## 2017-11-25 RX ORDER — BUPROPION HYDROCHLORIDE 150 MG/1
TABLET ORAL
Qty: 30 TAB | Refills: 0 | Status: SHIPPED | OUTPATIENT
Start: 2017-11-25 | End: 2017-12-29 | Stop reason: SDUPTHER

## 2017-12-07 ENCOUNTER — OFFICE VISIT (OUTPATIENT)
Dept: FAMILY MEDICINE CLINIC | Age: 37
End: 2017-12-07

## 2017-12-07 VITALS
HEART RATE: 63 BPM | DIASTOLIC BLOOD PRESSURE: 63 MMHG | RESPIRATION RATE: 20 BRPM | OXYGEN SATURATION: 98 % | BODY MASS INDEX: 39.59 KG/M2 | HEIGHT: 71 IN | WEIGHT: 282.8 LBS | TEMPERATURE: 97.4 F | SYSTOLIC BLOOD PRESSURE: 123 MMHG

## 2017-12-07 DIAGNOSIS — G89.29 CHRONIC PAIN OF LEFT KNEE: Primary | ICD-10-CM

## 2017-12-07 DIAGNOSIS — M25.562 CHRONIC PAIN OF LEFT KNEE: Primary | ICD-10-CM

## 2017-12-07 DIAGNOSIS — L30.9 ECZEMA, UNSPECIFIED TYPE: ICD-10-CM

## 2017-12-07 PROBLEM — M25.561 CHRONIC PAIN OF RIGHT KNEE: Status: ACTIVE | Noted: 2017-12-07

## 2017-12-07 NOTE — PROGRESS NOTES
S: Rosebud Harada is a 42 y.o. female who presents for HTN/blood pressure follow up. Assessment/Plan:  1. Chronic pain of left knee  -knee pain not improving - worried about falling on stairs  -referral to Dr. Rocky Pulido, sports medicine to assess pain and possible cortisone injection    2. Eczema, unspecified type  -advised lukewarm water when washing, hydrating hands at night with A&D ointment in cotton gloves  -betamethasone ointment when needed, but no more than 7 days in a row    3. Prediabetes  -having gi sx on 4 tabs of metformin; advised to take 3 at night and if tolerates, take 4 at night; otherwise stay at highest dose tolerable. RTC 3 months for weight check, labs       Rosebud Harada has lost 8lbs since last visit 10/2017. Has a goal to lose weight by 42yo   Has a 1 month old grandchild now that she is taking care of   Drinking a lot more water - at least 64 oz daily and consciously not drinking soda    No chest pain or discomfort, elevated heart rate,  palpitations or edema in extremities    Taking all medication appropriately - taking 2 metformin in am and 2 at night, but having some GI pain/diarrhea  Discussed taking 3 at night    Using voltaren gel on knee - works when it is on   Pain in am and going up and down stairs  Uses handrail to go up and down stairs but not confident     Hands - eczema looks better   Right - between fingers and tips, but pt states it is much better    Review of Systems:  - Constitutional symptoms: no fevers, chills or fatigue  - Eyes: no blurry vision or double vision   - Respiratory: no SOB, difficulty or pain with breathing, wheezes, or cough. - Gastrointestinal: no dysphagia or abdominal pain  - Neurological: no headache, vision changes, numbness and tingling or weakness in  extremities. ROS is negative otherwise.   PHQ over the last two weeks 10/26/2017   Little interest or pleasure in doing things Not at all   Feeling down, depressed or hopeless Not at all   Total Score PHQ 2 0       I reviewed the following:  Past Medical History:   Diagnosis Date    Arthritis     Back pain     Depression     Eczema     H/O: hysterectomy 2016    ovaries intact       Current Outpatient Prescriptions   Medication Sig Dispense Refill    metFORMIN ER (GLUCOPHAGE XR) 500 mg tablet TAKE 2 TABLETS BY MOUTH EVERY DAY WITH EVENING MEAL 90 Tab 3    buPROPion XL (WELLBUTRIN XL) 150 mg tablet TAKE 1 TAB BY MOUTH EVERY MORNING. INDICATIONS: ANXIETY WITH DEPRESSION, SMOKING CESSATION 30 Tab 0    simvastatin (ZOCOR) 20 mg tablet Take 1 Tab by mouth nightly. Indications: hyperlipidemia 90 Tab 3    betamethasone dipropionate (DIPROLENE) 0.05 % ointment Apply  to affected area two (2) times a day. 15 g 0    diclofenac (VOLTAREN) 1 % gel Apply 4 g to affected area four (4) times daily. 100 g 2    METHOCARBAMOL (ROBAXIN PO) Take  by mouth every six (6) hours as needed. Unknown dosage      ERGOCALCIFEROL, VITAMIN D2, (VITAMIN D2 PO) Take  by mouth daily. Allergies   Allergen Reactions    Bactrim [Sulfamethoprim Ds] Hives    Codeine Itching    Pcn [Penicillins] Hives    Pcn [Penicillins] Nausea and Vomiting    Sulfur Hives       O: VS:   Visit Vitals    /63 (BP 1 Location: Left arm, BP Patient Position: Sitting)    Pulse 63    Temp 97.4 °F (36.3 °C) (Oral)    Resp 20    Ht 5' 11\" (1.803 m)    Wt 282 lb 12.8 oz (128.3 kg)    SpO2 98%    BMI 39.44 kg/m2       GENERAL: Jose A Liter is in no acute distress. EYE: PERRLA. EOMs intact. Sclera anicteric without injection. RESP: Unlabored without SOB. Speaking in full sentences. Breath sounds are symmetrical bilaterally. Clear to auscultation to all fields. No wheezes. No rales or rhonchi. CV: Normal rate. Regular rhythm. S1, S2 audible. No murmur noted. No rubs, clicks or gallops noted. NEURO:  awake, alert and oriented to person, place, and time and event. Clear speech. Steady gait.    HEME/LYMPH: Peripheral pulses palpable 2+ x 4 extremities. No peripheral edema is noted. SKIN: Skin is warm and dry. Turgor is normal. No petechiae, no purpura, no rash. No cyanosis. No mottling, jaundice or pallor. Bilateral hands: erythema noted on edge of palms. No edema or skin tears noted. ____________________________________________________________________  Reviewed medications and side effects. Reviewed warning signs of hypertension, stroke and heart attack. Advised on nutrition, physical activity, weight management, tobacco, and alcohol.     Patient verbalized understanding and agreed to plan of care. Follow up in 3 months as directed.

## 2017-12-07 NOTE — MR AVS SNAPSHOT
Visit Information Date & Time Provider Department Dept. Phone Encounter #  
 12/7/2017 11:40 AM Ariadna Cloud NP [facility] [phone] [id number] Upcoming Health Maintenance Date Due Pneumococcal 19-64 Medium Risk (1 of 1 - PPSV23) 3/28/1997 PAP AKA CERVICAL CYTOLOGY 3/28/1999 DTaP/Tdap/Td series (2 - Td) 8/30/2026 Allergies as of 12/7/2017  Review Complete On: 12/7/2017 By: Germán Guzmán LPN Severity Noted Reaction Type Reactions Bactrim [Sulfamethoprim Ds]  07/15/2015    Hives Codeine  07/15/2015    Itching Pcn [Penicillins]  07/15/2015    Hives Pcn [Penicillins]  10/26/2017    Nausea and Vomiting Sulfur  10/26/2017    Hives Current Immunizations  Reviewed on 10/26/2017 Name Date Influenza Vaccine (Quad) PF 10/26/2017 Tdap 8/30/2016 Not reviewed this visit You Were Diagnosed With   
  
 Codes Comments Chronic pain of left knee    -  Primary ICD-10-CM: M25.562, P16.42 ICD-9-CM: 719.46, 338.29 Eczema, unspecified type     ICD-10-CM: L30.9 ICD-9-CM: 692.9 Vitals BP Pulse Temp Resp Height(growth percentile) Weight(growth percentile) 123/63 (BP 1 Location: Left arm, BP Patient Position: Sitting) 63 97.4 °F (36.3 °C) (Oral) 20 5' 11\" (1.803 m) 282 lb 12.8 oz (128.3 kg) SpO2 BMI OB Status Smoking Status 98% 39.44 kg/m2 Having regular periods Current Every Day Smoker BMI and BSA Data Body Mass Index Body Surface Area  
 39.44 kg/m 2 2.54 m 2 Preferred Pharmacy Pharmacy Name Phone [id number] [address] [phone] Your Updated Medication List  
  
   
This list is accurate as of: 12/7/17 12:14 PM.  Always use your most recent med list.  
  
  
  
  
 betamethasone dipropionate 0.05 % ointment Commonly known as:  [place] Apply  to affected area two (2) times a day. buPROPion  mg tablet Commonly known as:  WELLBUTRIN XL  
TAKE 1 TAB BY MOUTH EVERY MORNING. INDICATIONS: ANXIETY WITH DEPRESSION, SMOKING CESSATION  
  
 cyclobenzaprine 10 mg tablet Commonly known as:  FLEXERIL Take 1 Tab by mouth three (3) times daily as needed for Muscle Spasm(s). diclofenac 1 % Gel Commonly known as:  VOLTAREN Apply 4 g to affected area four (4) times daily. metaxalone 800 mg tablet Commonly known as:  SKELAXIN Take 800 mg by mouth three (3) times daily as needed for Pain.  
  
 metFORMIN  mg tablet Commonly known as:  GLUCOPHAGE XR  
TAKE 2 TABLETS BY MOUTH EVERY DAY WITH EVENING MEAL  
  
 ondansetron 4 mg disintegrating tablet Commonly known as:  ZOFRAN ODT Take 1 Tab by mouth every eight (8) hours as needed for Nausea. ROBAXIN PO Take  by mouth every six (6) hours as needed. Unknown dosage  
  
 simvastatin 20 mg tablet Commonly known as:  ZOCOR Take 1 Tab by mouth nightly. Indications: hyperlipidemia TRILEPTAL PO Take  by mouth. Unknown dose VITAMIN D2 PO Take  by mouth daily. We Performed the Following REFERRAL TO SPORTS MEDICINE [FGZ476 Custom] Referral Information Referral ID Referred By Referred To  
  
 [id number] [facility]   
   [address] [place] [address] Phone: [phone] Visits Status Start Date End Date 1 New Request 12/7/17 12/7/18 If your referral has a status of pending review or denied, additional information will be sent to support the outcome of this decision. Patient Instructions 1) Referral to Dr. David Funes, Sports Medicine. Please make an appointment with Dr. Richar Bloom. His office is located at [facility] ([facility]) AT [address]: [phone] To assess the left knee pain 2) Eczema - use only lukewarm water when you wash hands.   Try and stay away from hand  - the alcohol in it really dries out the skin. At night, use A&D ointment on hands and put in cotton gloves or socks overnight to help moisturize and heal hands. Use steroid cream sparingly and for no longer than 7 days in a row 3) Metformin - try taking 3 at night instead of 2 in am and 2 in pm.  We don't want you to have ab pain or chronic diarrhea. If you tolerate the 3 pills at night, after 1-2 weeks, see if you can add the 4th pill. If not, go back to dose that you can tolerate without the Gi symptoms. 4) Keep up the good work with the weight loss! Introducing [facility]! Dear Devan Means: 
Thank you for requesting a [employer] account. Our records indicate that you already have an active [employer] account. You can access your account anytime at [email or web address] Did you know that you can access your hospital and ER discharge instructions at any time in [employer]? You can also review all of your test results from your hospital stay or ER visit. Additional Information If you have questions, please visit the Frequently Asked Questions section of the [employer] website at [email or web address] Remember, Ex[employer] is NOT to be used for urgent needs. For medical emergencies, dial 911. Now available from your iPhone and Android! Please provide this summary of care documentation to your next provider. Your primary care clinician is listed as Cristal Damon. If you have any questions after today's visit, please call [phone].

## 2017-12-07 NOTE — PATIENT INSTRUCTIONS
1) Referral to Dr. Glen Benz, Sports Medicine. Please make an appointment with Dr. Bull Gomez. His office is located at [facility] ([address] [place] [address]) ph: [phone]  To assess the left knee pain    2) Eczema - use only lukewarm water when you wash hands. Try and stay away from hand  - the alcohol in it really dries out the skin. At night, use A&D ointment on hands and put in cotton gloves or socks overnight to help moisturize and heal hands. Use steroid cream sparingly and for no longer than 7 days in a row    3) Metformin - try taking 3 at night instead of 2 in am and 2 in pm.  We don't want you to have ab pain or chronic diarrhea. If you tolerate the 3 pills at night, after 1-2 weeks, see if you can add the 4th pill. If not, go back to dose that you can tolerate without the Gi symptoms. 4) Keep up the good work with the weight loss!

## 2017-12-07 NOTE — Clinical Note
Gucci Munoz - referring this nice pt who has knee pain - she has been doing conservative tx but now is worried she might fall on stairs. Thought you could work your US/cortisone magic and if not, send her on to ortho. Thanks!

## 2017-12-07 NOTE — PROGRESS NOTES
Chief Complaint   Patient presents with    Medication Evaluation    Blood Pressure Check       Reviewed record in preparation for visit and have obtained necessary documentation. Identified pt with two pt identifiers(name and ). Chief Complaint   Patient presents with    Medication Evaluation    Blood Pressure Check       Vitals:    17 1146   BP: 123/63   Pulse: 63   Resp: 20   Temp: 97.4 °F (36.3 °C)   TempSrc: Oral   SpO2: 98%   Weight: 282 lb 12.8 oz (128.3 kg)   Height: 5' 11\" (1.803 m)   PainSc:   7   PainLoc: Knee       Coordination of Care Questionnaire:  :     1) Have you been to an emergency room, urgent care clinic since your last visit? no   Hospitalized since your last visit? no             2) Have you seen or consulted any other health care providers outside of [address] [place] [address] since your last visit? no  (Include any pap smears or colon screenings in this section.)    3) In the event something were to happen to you and you were unable to speak on your behalf, do you have an Advance Directive/ Living Will in place stating your wishes? NO    Do you have an Advance Directive on file? no    4) Are you interested in receiving information on Advance Directives? NO    Patient is accompanied by self I have received verbal consent from [facility] to discuss any/all medical information while they are present in the room.

## 2017-12-12 ENCOUNTER — OFFICE VISIT (OUTPATIENT)
Dept: INTERNAL MEDICINE CLINIC | Age: 37
End: 2017-12-12

## 2017-12-12 VITALS
WEIGHT: 287.2 LBS | BODY MASS INDEX: 40.21 KG/M2 | HEART RATE: 68 BPM | OXYGEN SATURATION: 97 % | DIASTOLIC BLOOD PRESSURE: 65 MMHG | HEIGHT: 71 IN | SYSTOLIC BLOOD PRESSURE: 118 MMHG

## 2017-12-12 DIAGNOSIS — M25.562 ACUTE PAIN OF LEFT KNEE: ICD-10-CM

## 2017-12-12 DIAGNOSIS — M17.12 PRIMARY OSTEOARTHRITIS OF LEFT KNEE: Primary | ICD-10-CM

## 2017-12-12 PROBLEM — E66.01 OBESITY, MORBID (HCC): Status: ACTIVE | Noted: 2017-12-12

## 2017-12-12 RX ORDER — ESCITALOPRAM OXALATE 20 MG/1
20 TABLET ORAL DAILY
COMMUNITY
End: 2018-01-24 | Stop reason: SDUPTHER

## 2017-12-12 NOTE — PROGRESS NOTES
Chief Complaint   Patient presents with    Knee Pain     she is a 42y.o. year old female who presents for evaluation of left knee pain. Pain Assessment Encounter      Jarrod Schuster  12/12/2017  Onset of Symptoms: 6 months  ________________________________________________________________________  Description:pt denies any specific injury. Pt points to pain immediately inferior to patella bone, anteriorly. Frequency: more than 5 times a day  Pain Scale:(1-10): 0 currrently and 8/10 at it worse. Trauma Hx: none  Hx of similar symptoms: No  Radiation: none  Duration:  intermittent      Progression: has worsened  What makes it better?: none  What makes it worse?:sitting still, standing from a sitting position, going DOWN stairs. If left knee is not moved for a while, like overnight. Medications tried: Motrin, Voltaren. Reviewed and agree with Nurse Note and duplicated in this note. Reviewed PmHx, RxHx, FmHx, SocHx, AllgHx and updated and dated in the chart.     Family History   Problem Relation Age of Onset    Cancer Father     Heart Disease Father        Past Medical History:   Diagnosis Date    Arthritis     Back pain     Depression     Eczema     H/O: hysterectomy 2016    ovaries intact      Social History     Social History    Marital status:      Spouse name: N/A    Number of children: N/A    Years of education: N/A     Social History Main Topics    Smoking status: Current Every Day Smoker     Packs/day: 0.50     Years: 12.00    Smokeless tobacco: Never Used    Alcohol use 0.6 oz/week     1 Glasses of wine per week    Drug use: No    Sexual activity: Yes     Partners: Male     Other Topics Concern    Not on file     Social History Narrative    ** Merged History Encounter **             Review of Systems - negative except as listed above      Objective:     Vitals:    12/12/17 0938   BP: 118/65   Pulse: 68   SpO2: 97%   Weight: 287 lb 3.2 oz (130.3 kg)   Height: 5' 11\" (1.803 m)       Physical Examination: General appearance - alert, well appearing, and in no distress  Back exam - full range of motion, no tenderness, palpable spasm or pain on motion  Neurological - alert, oriented, normal speech, no focal findings or movement disorder noted  Musculoskeletal - left knee exam:  The patient'sleft Knee  is  normal to inspection. The ROM is normal and there is flexion to 60 Effusion is: mild The joint exhibits absent warmth and Crepitus is: mild. The Kelly test is:  Negative Joint Line Tenderness is  Positive medial.  The Thessaly Test is Negative  and the Lachman is  negative. The Anterior Drawer is Negative. The Posterior Drawer is:  negative. Valgus Stress (for MCL) is:  normal . Varus Stress (for LCL) is  normal  . The Madeline Test is negative and the Apprehension Sign:  negative  Patellar Grind Negative and Tracking is normal    Extremities - peripheral pulses normal, no pedal edema, no clubbing or cyanosis  Skin - normal coloration and turgor, no rashes, no suspicious skin lesions noted    Assessment/ Plan:   Diagnoses and all orders for this visit:    1. Primary osteoarthritis of left knee    2. Acute pain of left knee  -     XR KNEE LT MIN 4 V; Future         Pathophysiology, recovery and rehabilitation process discussed and questions answered   Counseling for 30 Minutes of the total visit duration   Pictures and figures used as necessary   Provided reassurance   Monitor response to Physical Therapy   Recommend activity modification   Recommend  lower impact activities-walking, Eliptical, Nordic Track, cycling or swimming   Follow up in 4 week(s)  Xray's reviewed - within normal limits     1) Remember to stay active and/or exercise regularly (I suggest 30-45 minutes daily)   2) For reliable dietary information, go to www. EATRIGHT.org. You may wish to consider seeing the nutritionist at [facility] [phone], also consider the [address]       I have discussed the diagnosis with the patient and the intended plan as seen in the above orders. The patient has received an after-visit summary and questions were answered concerning future plans. Medication Side Effects and Warnings were discussed with patient: yes  Patient Labs were reviewed and or requested: yes  Patient Past Records were reviewed and or requested  yes  I have discussed the diagnosis with the patient and the intended plan as seen in the above orders. The patient has received an after-visit summary and questions were answered concerning future plans. Pt agrees to call or return to clinic and/or go to closest ER with any worsening of symptoms. This may include, but not limited to increased fever (>100.4) with NSAIDS or Tylenol, increased edema, confusion, rash, worsening of presenting symptoms.

## 2017-12-12 NOTE — MR AVS SNAPSHOT
Visit Information Date & Time Provider Department Dept. Phone Encounter #  
 12/12/2017 10:00 AM [facility] [place] [address] [id number] Your Appointments 3/7/2018 11:40 AM  
ROUTINE CARE with Avelina Nails NP [address] (SHAJI Rivero) Appt Note: 3 MO F/U meds [address] [facility] [address]  
[phone]  
  
   
 [address] [facility] [address] [place] [address] Upcoming Health Maintenance Date Due Pneumococcal 19-64 Medium Risk (1 of 1 - PPSV23) 3/28/1997 PAP AKA CERVICAL CYTOLOGY 3/28/1999 DTaP/Tdap/Td series (2 - Td) 8/30/2026 Allergies as of 12/12/2017  Review Complete On: 12/7/2017 By: Avelina Nails NP Severity Noted Reaction Type Reactions Bactrim [Sulfamethoprim Ds]  07/15/2015    Hives Codeine  07/15/2015    Itching Pcn [Penicillins]  07/15/2015    Hives Pcn [Penicillins]  10/26/2017    Nausea and Vomiting Sulfur  10/26/2017    Hives Current Immunizations  Reviewed on 10/26/2017 Name Date Influenza Vaccine (Quad) PF 10/26/2017 Tdap 8/30/2016 Not reviewed this visit You Were Diagnosed With   
  
 Codes Comments Primary osteoarthritis of left knee    -  Primary ICD-10-CM: M17.12 
ICD-9-CM: 715.16 Acute pain of left knee     ICD-10-CM: M25.562 ICD-9-CM: 719.46 Vitals BP Pulse Height(growth percentile) Weight(growth percentile) LMP SpO2  
 118/65 (BP 1 Location: Right arm, BP Patient Position: Sitting) 68 5' 11\" (1.803 m) 287 lb 3.2 oz (130.3 kg) 11/24/2015 (Exact Date) 97% BMI OB Status Smoking Status 40.06 kg/m2 Hysterectomy Current Every Day Smoker Vitals History BMI and BSA Data Body Mass Index Body Surface Area 40.06 kg/m 2 2.55 m 2 Preferred Pharmacy Pharmacy Name Phone  [id number] [address] [phone] Your Updated Medication List  
  
   
This list is accurate as of: 12/12/17 10:14 AM.  Always use your most recent med list.  
  
  
  
  
 betamethasone dipropionate 0.05 % ointment Commonly known as:  [place] Luc[place] Apply  to affected area two (2) times a day. buPROPion  mg tablet Commonly known as:  WELLBUTRIN XL  
TAKE 1 TAB BY MOUTH EVERY MORNING. INDICATIONS: ANXIETY WITH DEPRESSION, SMOKING CESSATION  
  
 diclofenac 1 % Gel Commonly known as:  VOLTAREN Apply 4 g to affected area four (4) times daily. LEXAPRO 20 mg tablet Generic drug:  escitalopram oxalate Take 20 mg by mouth daily. metFORMIN  mg tablet Commonly known as:  GLUCOPHAGE XR  
TAKE 2 TABLETS BY MOUTH EVERY DAY WITH EVENING MEAL  
  
 ROBAXIN PO Take  by mouth every six (6) hours as needed. Unknown dosage  
  
 simvastatin 20 mg tablet Commonly known as:  ZOCOR Take 1 Tab by mouth nightly. Indications: hyperlipidemia VITAMIN D2 PO Take  by mouth daily. To-Do List   
 12/12/2017 Imaging:  XR KNEE LT MIN 4 V Introducing [facility]! Dear Shawn Navarro: 
Thank you for requesting a EventTool account. Our records indicate that you already have an active EventTool account. You can access your account anytime at [email or web address] Did you know that you can access your hospital and ER discharge instructions at any time in EventTool? You can also review all of your test results from your hospital stay or ER visit. Additional Information If you have questions, please visit the Frequently Asked Questions section of the EventTool website at [email or web address] Remember, EventTool is NOT to be used for urgent needs. For medical emergencies, dial 911. Now available from your iPhone and Android! Please provide this summary of care documentation to your next provider. Your primary care clinician is listed as Marco A Portal. If you have any questions after today's visit, please call [phone].

## 2017-12-20 ENCOUNTER — HOSPITAL ENCOUNTER (OUTPATIENT)
Dept: PHYSICAL THERAPY | Age: 37
Discharge: HOME | End: 2017-12-20
Payer: MEDICAID

## 2017-12-20 PROCEDURE — 97110 THERAPEUTIC EXERCISES: CPT | Performed by: PHYSICAL THERAPIST

## 2017-12-20 PROCEDURE — 97161 PT EVAL LOW COMPLEX 20 MIN: CPT | Performed by: PHYSICAL THERAPIST

## 2017-12-20 NOTE — PROGRESS NOTES
[facility]  [address], [place], [address]  Phone: [phone]  Fax: [phone]      PT INITIAL EVALUATION NOTE - [facility] 2-15    Patient Name: Juan M Santos  Date:2017  : 1980  [x]  Patient  Verified  Payor: BLUE CROSS MEDICAID / Plan: Delbert Aldair / Product Type: Managed Care Medicaid /    In time: 10:10 AM  Out time:11:00 AM  Total Treatment Time (min): 50  Total Timed Codes (min): 15  1:1 Treatment Time ( only): --   Visit #: 1     Treatment Area: Left knee pain [M25.562]    SUBJECTIVE  Any medication changes, allergies to medications, adverse drug reactions, diagnosis change, or new procedure performed?: [] No    [x] Yes (see summary sheet for update)    Current symptoms/chief complaint:   Pt presents with L knee pain. Gradual onset 2017. She went to Dr. Rashmi Nelson- was given voltaren cream- \"it worked at first and eventually stopped working. \" MD sent pt to Dr. Sherron Arango-- x-ray-- \"beginning stages of spurring and deteriorating cartilage. \" She was given a J-brace- \"when I take it off my knee hurts worse then before I put it on, I don't think its helping. \" She is currently babysitting her 1 month old granddaughter. She states occasional swelling in L knee. Pt states she wears good, supportive tennis shoes 90% of the time. Date of onset/injury: 2017    Aggravated by: getting out of chair, walking, stairs    Eased by:  rest    Pain:   8/10 max 3/10 min 7/10 now       Location and description of symptoms: \"its achy all the time and sometimes is a shooting pain\" L knee    Diagnostic Tests: [] Lab work [x] X-rays    [] CT [] MRI     [] Other:  Results (per report of the patient): see above. PMHX: Significant for fractured R patella when 13years old; hysterectomy 2016; pre-diabetic; high cholesterol    Social/Recreation/Work: Pt currently takes care of 3month old granddaughter.  States she was laid off from [employer]. She tries to be active to lose weight but states her knee has been limiting her physical activity. Prior level of function: Prior to July, no history of knee pain. Able to ascend/descend stairs and walk without pain    Patient goal(s): \"to improve my pain\"    Objective:    Posture/Observations: n/a     Gait:    No significant abnormalities         ROM  L AROM flex= 120 deg. P!  R AROM flex= 125 deg. Ext WNL bilat. No pain. Strength (1-5)           Right Left Comments   Hip flexion 5 5    Knee ext 5 5 Slight p! On L   Knee flex 5 5    Ankle DF 5 5    SLR 4+ 4+    Hip abd -- 4+         Functional Biomechanical Screen  SLS on R: >10 sec. SLS on L: 8-10 sec. Mod to max postural sway. p! Caputo Squat Test: neg. \"it's worse\"                                Palpation: Mod to severe TTP L patellar tendon  Mod to min TTP L lateral and medial patellar retinaculum, L ITB    Joint Mobility:  Patellar mobs WNL. Slight p! On L    Optional Tests  Ant. Drawer:  [x] Neg    [] Pos  Post. Drawer:  [x] Neg    [] Pos  Kelly's:  [] Neg    [x] Pos      Outcome Measure:  FOTO= 35/100     OBJECTIVE    15 min Therapeutic Exercise:  [x] See flow sheet : HS stretch, ITB stretch, prone quad stretch, clamshells   Rationale: increase ROM and increase strength to improve the patients ability to exercise, perform daily activities with dec'd pain levels    10 min-- ice. L knee. With   [x] TE   [] TA   [] neuro   [] other: Patient Education: [x] Review HEP    [] Progressed/Changed HEP based on:   [] positioning   [] body mechanics   [] transfers   [x] heat/ice application    [x] other: lee ann/phys; importance of ice and ice massage over patellar tendon and medial/lateral to patella to reduce pain levels; reviewed importance of wearing brace when necessary and to not leave it on if sedentary.        Pain Level (0-10 scale) post treatment: \"the ice felt good\"      Assessment: See POC    Plan: See Matt Frausto PT, [place]    12/20/2017

## 2017-12-20 NOTE — PROGRESS NOTES
[facility]  [address]  [place], [address]  Phone: [phone]  Fax: [phone]    Plan of Care/Statement of Necessity for Physical Therapy Services  2-15    Patient name: Juan M Santos  : 1980  Provider#: [id number]  Referral source: Osvaldo Middleton MD      Medical/Treatment Diagnosis: Left knee pain [M25.562]     Prior Hospitalization: see medical history     Comorbidities: see evaluation  Prior Level of Function: see evaluation  Medications: Verified on Patient Summary List    Start of Care: 17      Onset Date: 2017       The Plan of Care and following information is based on the information from the initial evaluation. Assessment/ key information: Pt is a 42year old female presenting with L knee pain and will benefit from PT to address problem list below.     Evaluation Complexity History MEDIUM  Complexity : 1-2 comorbidities / personal factors will impact the outcome/ POC ; Examination LOW Complexity : 1-2 Standardized tests and measures addressing body structure, function, activity limitation and / or participation in recreation  ;Presentation LOW Complexity : Stable, uncomplicated  ;Clinical Decision Making MEDIUM Complexity : FOTO score of 26-74  Overall Complexity Rating: LOW     Problem List: pain affecting function, decrease ROM, decrease strength, impaired gait/ balance, decrease ADL/ functional abilitiies, decrease activity tolerance, decrease flexibility/ joint mobility and decrease transfer abilities   Treatment Plan may include any combination of the following: Therapeutic exercise, Therapeutic activities, Neuromuscular re-education, Physical agent/modality, Gait/balance training, Manual therapy, Patient education, Self Care training, Functional mobility training, Home safety training and Stair training  Patient / Family readiness to learn indicated by: asking questions, trying to perform skills and interest  Persons(s) to be included in education: patient (P)  Barriers to Learning/Limitations: None  Patient Goal (s): see evaluation  Patient Self Reported Health Status: good  Rehabilitation Potential: good    Short Term Goals: To be accomplished in 2-3 weeks:  1) Pt will be independent in initial HEP  2) Pt will report > or =25% decrease in exacerbation of symptoms  3) Pt will report compliance with ice regimen    Long Term Goals: To be accomplished in 4-6 weeks:  1) Pt will transfer from sit-->stand with < or =2/10 pain in order to care for granddaughter  2) Pt will improve FOTO score to > or = 56/100 in order to demonstrate improvement in functional mobility  3) Pt will perform SLS on L for > or =10 sec with < 2/10 pain in order to demonstrate improved proprioception and aid in exercise    Frequency / Duration: Patient to be seen 1-2 times per week for 4-6 weeks. Patient/ Caregiver education and instruction: self care, activity modification and exercises    [x]  Plan of care has been reviewed with LUCRETIA Boone, PT 12/20/2017 10:15 AM    ________________________________________________________________________    I certify that the above Therapy Services are being furnished while the patient is under my care. I agree with the treatment plan and certify that this therapy is necessary.     [de-identified] Signature:____________________  Date:____________Time: _________

## 2017-12-28 NOTE — TELEPHONE ENCOUNTER
----- Message from Kathryn Erickson sent at 12/28/2017 10:57 AM EST -----  Regarding: JOAN Torres/[place]  [facility] is requesting that the doctor calls in a refill for Rx Bupropion 150mg XL is called into the pharmacy.  [phone])[phone]

## 2017-12-29 NOTE — TELEPHONE ENCOUNTER
PCP: Alisha Araujo NP    Last appt: 12/7/2017  Future Appointments  Date Time Provider Chucky Gee   1/3/2018 5:00 PM Lucrecia Robles, PTA [facility]   1/8/2018 5:00 PM [facility], [place] [facility]   1/10/2018 5:30 PM Jena Duncan, PT [facility]   1/15/2018 5:30 PM Jena Specter, PT [facility]   1/17/2018 5:30 PM Jena Specter, PT [facility]   1/22/2018 5:30 PM Jena Spectkatrin, PT [facility]   1/24/2018 5:30 PM Jena Spectkatrin, PT [facility]   3/7/2018 11:40 AM JOAN BorjaFP SHAJI SCHED       Requested Prescriptions     Pending Prescriptions Disp Refills    buPROPion XL (WELLBUTRIN XL) 150 mg tablet 30 Tab 0       Request for a 30 or 90 day supply? 30  Pharmacy:  Last Refill:11/25/2017  Other Comments:  Last Labs:11/02/2017

## 2017-12-31 RX ORDER — BUPROPION HYDROCHLORIDE 150 MG/1
TABLET ORAL
Qty: 90 TAB | Refills: 3 | Status: SHIPPED | OUTPATIENT
Start: 2017-12-31 | End: 2019-01-11 | Stop reason: SDUPTHER

## 2018-01-03 ENCOUNTER — APPOINTMENT (OUTPATIENT)
Dept: PHYSICAL THERAPY | Age: 38
End: 2018-01-03
Payer: MEDICAID

## 2018-01-10 ENCOUNTER — APPOINTMENT (OUTPATIENT)
Dept: PHYSICAL THERAPY | Age: 38
End: 2018-01-10
Payer: MEDICAID

## 2018-01-15 ENCOUNTER — HOSPITAL ENCOUNTER (OUTPATIENT)
Dept: PHYSICAL THERAPY | Age: 38
Discharge: HOME | End: 2018-01-15
Payer: MEDICAID

## 2018-01-15 PROCEDURE — 97110 THERAPEUTIC EXERCISES: CPT | Performed by: PHYSICAL THERAPIST

## 2018-01-15 PROCEDURE — 97140 MANUAL THERAPY 1/> REGIONS: CPT | Performed by: PHYSICAL THERAPIST

## 2018-01-15 NOTE — PROGRESS NOTES
PT DAILY TREATMENT NOTE 2-15    Patient Name: Jose E Palacios  Date:1/15/2018  : 1980  [x]  Patient  Verified  Payor: BLUE CROSS MEDICAID / Plan: [employer] / Product Type: Managed Care Medicaid /    In time:5:30 PM  Out time: 6:30 PM  Total Treatment Time (min): 60 (50 timed)  Visit #: 2     Treatment Area: Left knee pain [M25.562]    SUBJECTIVE  Pain Level (0-10 scale): 5/10  Any medication changes, allergies to medications, adverse drug reactions, diagnosis change, or new procedure performed?: [x] No    [] Yes (see summary sheet for update)  Subjective functional status/changes:   [] No changes reported  Pt states she has had a lot going on and was sick last week so she has missed a couple of appointments. She states \"I could be better about doing my exercises and icing\". Since she was sick she has not been doing much physical activity so her knee was feeling okay.  \"I did more today than I have in a while so that's probably why it's bothering me\"    OBJECTIVE    Modality rationale: decrease inflammation and decrease pain to improve the patients ability to performs ADL's   Min Type Additional Details    [] Estim: []Att   []Unatt        []TENS instruct                  []IFC  []Premod   []NMES                     []Other:  []w/US   []w/ice   []w/heat  Position:  Location:    []  Traction: [] Cervical       []Lumbar                       [] Prone          []Supine                       []Intermittent   []Continuous Lbs:  [] before manual  [] after manual  []w/heat    []  Ultrasound: []Continuous   [] Pulsed at:                            []1MHz   []3MHz Location:  W/cm2:    []  Paraffin         Location:  []w/heat   10 [x]  Ice     []  Heat  []  Ice massage Position:supine, LE's elevated  Location: L knee    []  Laser  []  Other: Position:  Location:    []  Vasopneumatic Device Pressure:       [] lo [] med [] hi   Temperature:    [x] Skin assessment post-treatment:  [x]intact []redness- no adverse reaction    []redness - adverse reaction:     40 min Therapeutic Exercise:  [] See flow sheet :   Rationale: increase ROM and increase strength to improve the patients ability to go up/down steps; grocery shop without pain    10 min Manual Therapy:  L patellar mobs  Manual rectus stretch off table  Roller over L ITB   Rationale: decrease pain, increase ROM and increase tissue extensibility  to improve the patients ability to ambulate with decreased pain            With   [] TE   [] TA   [] neuro   [] other: Patient Education: [x] Review HEP    [] Progressed/Changed HEP based on:   [] positioning   [] body mechanics   [] transfers   [] heat/ice application    [] other:      Other Objective/Functional Measures: n/a     Pain Level (0-10 scale) post treatment: 5/10    ASSESSMENT/Changes in Function:   Strongly encouraged consistent performance of HEP and use of ice in order to maximize benefit of PT. Pt demonstrated severe quad fatigue with 15 reps of SLR with ER. Patient will continue to benefit from skilled PT services to modify and progress therapeutic interventions, address functional mobility deficits, address ROM deficits, address strength deficits, analyze and address soft tissue restrictions, analyze and cue movement patterns and analyze and modify body mechanics/ergonomics to attain remaining goals.      []  See Plan of Care  []  See progress note/recertification  []  See Discharge Summary         Progress towards goals / Updated goals:  nt    PLAN  []  Upgrade activities as tolerated     []  Continue plan of care  []  Update interventions per flow sheet       []  Discharge due to:_  []  Other:_      Florentino Ma, PT 1/15/2018  6:06 PM

## 2018-01-17 ENCOUNTER — APPOINTMENT (OUTPATIENT)
Dept: PHYSICAL THERAPY | Age: 38
End: 2018-01-17
Payer: MEDICAID

## 2018-01-25 RX ORDER — ESCITALOPRAM OXALATE 20 MG/1
20 TABLET ORAL DAILY
Qty: 30 TAB | Refills: 3 | Status: SHIPPED | OUTPATIENT
Start: 2018-01-25 | End: 2018-05-21 | Stop reason: SDUPTHER

## 2018-02-20 ENCOUNTER — OFFICE VISIT (OUTPATIENT)
Dept: FAMILY MEDICINE CLINIC | Age: 38
End: 2018-02-20

## 2018-02-20 VITALS
SYSTOLIC BLOOD PRESSURE: 136 MMHG | HEART RATE: 66 BPM | TEMPERATURE: 97.6 F | RESPIRATION RATE: 20 BRPM | DIASTOLIC BLOOD PRESSURE: 72 MMHG | WEIGHT: 281.5 LBS | HEIGHT: 71 IN | BODY MASS INDEX: 39.41 KG/M2 | OXYGEN SATURATION: 98 %

## 2018-02-20 DIAGNOSIS — M75.52 BURSITIS OF LEFT SHOULDER: Primary | ICD-10-CM

## 2018-02-20 RX ORDER — PREDNISONE 10 MG/1
TABLET ORAL
Qty: 18 TAB | Refills: 0 | Status: SHIPPED | OUTPATIENT
Start: 2018-02-20 | End: 2020-02-28

## 2018-02-20 NOTE — PROGRESS NOTES
Chief Complaint   Patient presents with    Shoulder Pain     left x 1 day     [employer]  Identified pt with two pt identifiers(name and ). Chief Complaint   Patient presents with    Shoulder Pain     left x 1 day       1. Have you been to the ER, urgent care clinic since your last visit? No   Hospitalized since your last visit? NO    2. Have you seen or consulted any other health care providers outside of the [facility] [employer] since your last visit? Include any pap smears or colon screening. NO    Today's provider has been notified of reason for visit, vitals and flowsheets obtained on patients.      Patient received paperwork for advance directive during previous visit but has not completed at this time     Reviewed record In preparation for visit, huddled with provider and have obtained necessary documentation      Health Maintenance Due   Topic    Pneumococcal 19-64 Medium Risk (1 of 1 - PPSV23)    PAP AKA CERVICAL CYTOLOGY        Wt Readings from Last 3 Encounters:   18 281 lb 8 oz (127.7 kg)   17 287 lb 3.2 oz (130.3 kg)   17 282 lb 12.8 oz (128.3 kg)     Temp Readings from Last 3 Encounters:   18 97.6 °F (36.4 °C) (Oral)   17 97.4 °F (36.3 °C) (Oral)   10/26/17 97.6 °F (36.4 °C) (Oral)     BP Readings from Last 3 Encounters:   18 136/72   17 118/65   17 123/63     Pulse Readings from Last 3 Encounters:   18 66   17 68   17 63     Vitals:    18 0954   BP: 136/72   Pulse: 66   Resp: 20   Temp: 97.6 °F (36.4 °C)   TempSrc: Oral   SpO2: 98%   Weight: 281 lb 8 oz (127.7 kg)   Height: 5' 11\" (1.803 m)   PainSc:   8   LMP: 2015         Learning Assessment:  :     Learning Assessment 10/26/2017   PRIMARY LEARNER Patient   HIGHEST LEVEL OF EDUCATION - PRIMARY LEARNER  GRADUATED HIGH SCHOOL OR GED   BARRIERS PRIMARY LEARNER NONE   PRIMARY LANGUAGE ENGLISH   LEARNER PREFERENCE PRIMARY VIDEOS   ANSWERED BY patient RELATIONSHIP SELF       Depression Screening:  :     PHQ over the last two weeks 10/26/2017   Little interest or pleasure in doing things Not at all   Feeling down, depressed or hopeless Not at all   Total Score PHQ 2 0       Fall Risk Assessment:  :     No flowsheet data found. Abuse Screening:  :     No flowsheet data found. ADL Screening:  :     ADL Assessment 12/7/2017   Feeding yourself No Help Needed   Getting from bed to chair No Help Needed   Getting dressed No Help Needed   Bathing or showering No Help Needed   Walk across the room (includes cane/walker) No Help Needed   Using the telphone No Help Needed   Taking your medications No Help Needed   Preparing meals No Help Needed   Managing money (expenses/bills) No Help Needed   Moderately strenuous housework (laundry) No Help Needed   Shopping for personal items (toiletries/medicines) No Help Needed   Shopping for groceries No Help Needed   Driving No Help Needed   Climbing a flight of stairs No Help Needed   Getting to places beyond walking distances No Help Needed                 Medication reconciliation up to date and corrected with patient at this time. Tolerated Ketorolac injection to right deltoid with no reactions. Monitored for 15 minutes before l;eaving office. No issues noted. Injection effective. Pain level 0 at time of departure.

## 2018-02-20 NOTE — PATIENT INSTRUCTIONS
1) Shoulder Pain  Toradol injection today -   Toradol (ketorolac) is used for the short-term treatment of moderate to severe pain. This medication is a nonsteroidal anti-inflammatory drug (NSAID). It works by blocking your body's production of certain natural substances that cause inflammation. This effect helps to decrease swelling, pain, or fever. Ketorolac should not be used for mild or long-term painful conditions (such as arthritis). Toradol can be given by mouth or via injection into the muscle and can help decrease severe pain, including tension and migraine headache pain. Prednisone is a steroid that reduces inflammation in the body. A prednisone burst is prescribed to help reduce inflammation quickly, but not used for long term control. Please take the prednisone 10 mg as directed below:    Day 1: Take 30mg (3 tablets) for 3 days, then  Day 4: Take 20mg (2 tablets) for 3 days, then  Day 7: Take 10mg (1 tablet) for 3 days    -For acute pain, rest, intermittent application of cold packs (later, may switch to heat after 48 hours). -Moist heat (such as shower or warm washcloth to area, no dry heat packs) applied to affected area tid x 20 minutes. -Relative rest.  Avoid strenuous lifting, pushing, pulling. --Alternate 800mg Ibuprofen with Tylenol every 4 hours as needed for pain and discomfort. Make sure to take Ibuprofen with food as it can cause stomach upset. Do not exceed 4000 mg Tylenol per day. -Consider Physical Therapy and X-Ray studies if not improving.

## 2018-02-20 NOTE — MR AVS SNAPSHOT
[address] [facility] 
 
 
 [address] 
[address] [place] [address] 
[phone] Patient: Glenn Pruett MRN: [id number] :1980 Visit Information Date & Time Provider Department Dept. Phone Encounter #  
 2018  9:40 AM Senthil Henriquez NP [facility] [phone] [id number] Your Appointments 3/7/2018 11:40 AM  
ROUTINE CARE with JOAN Owen (SHAJI Rivero) Appt Note: 3 MO F/U meds [address] [facility] [address]  
[phone]  
  
   
 [address] [place] [address] Upcoming Health Maintenance Date Due Pneumococcal 19-64 Medium Risk (1 of 1 - PPSV23) 3/28/1997 PAP AKA CERVICAL CYTOLOGY 3/28/1999 DTaP/Tdap/Td series (2 - Td) 2026 Allergies as of 2018  Review Complete On: 2018 By: Senthil Henriquez NP Severity Noted Reaction Type Reactions Bactrim [Sulfamethoprim Ds]  07/15/2015    Hives Codeine  07/15/2015    Itching Pcn [Penicillins]  07/15/2015    Hives Pcn [Penicillins]  10/26/2017    Nausea and Vomiting Sulfur  10/26/2017    Hives Current Immunizations  Reviewed on 10/26/2017 Name Date Influenza Vaccine (Quad) PF 10/26/2017 Tdap 2016 Not reviewed this visit You Were Diagnosed With   
  
 Codes Comments Bursitis of left shoulder    -  Primary ICD-10-CM: M75.52 
ICD-9-CM: 726.10 Vitals BP Pulse Temp Resp Height(growth percentile) Weight(growth percentile) 136/72 (BP 1 Location: Right arm, BP Patient Position: Sitting) 66 97.6 °F (36.4 °C) (Oral) 20 5' 11\" (1.803 m) 281 lb 8 oz (127.7 kg) LMP SpO2 BMI OB Status Smoking Status 2015 (Exact Date) 98% 39.26 kg/m2 Hysterectomy Current Every Day Smoker BMI and BSA Data Body Mass Index Body Surface Area  
 39.26 kg/m 2 2.53 m 2 Preferred Pharmacy Pharmacy Name Phone [id number] [phone] Your Updated Medication List  
  
   
This list is accurate as of: 18 10:28 AM.  Always use your most recent med list.  
  
  
  
  
 betamethasone dipropionate 0.05 % ointment Commonly known as:  Carlena Uriarte Apply  to affected area two (2) times a day. buPROPion  mg tablet Commonly known as:  WELLBUTRIN XL  
TAKE 1 TAB BY MOUTH EVERY MORNING. INDICATIONS: ANXIETY WITH DEPRESSION, SMOKING CESSATION  
  
 diclofenac 1 % Gel Commonly known as:  VOLTAREN Apply 4 g to affected area four (4) times daily. escitalopram oxalate 20 mg tablet Commonly known as:  [place] Take 1 Tab by mouth daily. metFORMIN  mg tablet Commonly known as:  GLUCOPHAGE XR  
TAKE 2 TABLETS BY MOUTH EVERY DAY WITH EVENING MEAL  
  
 predniSONE 10 mg tablet Commonly known as:  DELTASONE  
30mg daily x 3 days; 20mg daily x 3 days; 10mg daily x 3 days ROBAXIN PO Take  by mouth every six (6) hours as needed. Unknown dosage  
  
 simvastatin 20 mg tablet Commonly known as:  ZOCOR Take 1 Tab by mouth nightly. Indications: hyperlipidemia VITAMIN D2 PO Take  by mouth daily. Prescriptions Sent to Pharmacy Refills  
 predniSONE (DELTASONE) 10 mg tablet 0 Simg daily x 3 days; 20mg daily x 3 days; 10mg daily x 3 days Class: Normal  
 Pharmacy: [facility] #: [phone] Patient Instructions 1) Shoulder Pain Toradol injection today - Toradol (ketorolac) is used for the short-term treatment of moderate to severe pain. This medication is a nonsteroidal anti-inflammatory drug (NSAID). It works by blocking your body's production of certain natural substances that cause inflammation.  This effect helps to decrease swelling, pain, or fever. Ketorolac should not be used for mild or long-term painful conditions (such as arthritis). Toradol can be given by mouth or via injection into the muscle and can help decrease severe pain, including tension and migraine headache pain. Prednisone is a steroid that reduces inflammation in the body. A prednisone burst is prescribed to help reduce inflammation quickly, but not used for long term control. Please take the prednisone 10 mg as directed below:   
Day 1: Take 30mg (3 tablets) for 3 days, then Day 4: Take 20mg (2 tablets) for 3 days, then Day 7: Take 10mg (1 tablet) for 3 days 
 
-For acute pain, rest, intermittent application of cold packs (later, may switch to heat after 48 hours). -Moist heat (such as shower or warm washcloth to area, no dry heat packs) applied to affected area tid x 20 minutes. -Relative rest.  Avoid strenuous lifting, pushing, pulling. --Alternate 800mg Ibuprofen with Tylenol every 4 hours as needed for pain and discomfort. Make sure to take Ibuprofen with food as it can cause stomach upset. Do not exceed 4000 mg Tylenol per day. -Consider Physical Therapy and X-Ray studies if not improving. Introducing [facility]! Dear [facility]: 
Thank you for requesting a [employer] account. Our records indicate that you already have an active [employer] account. You can access your account anytime at [email or web address] Did you know that you can access your hospital and ER discharge instructions at any time in [employer]? You can also review all of your test results from your hospital stay or ER visit. Additional Information If you have questions, please visit the Frequently Asked Questions section of the [employer] website at [email or web address] Remember, [employer] is NOT to be used for urgent needs. For medical emergencies, dial 911. Now available from your iPhone and Android! Please provide this summary of care documentation to your next provider. Your primary care clinician is listed as Chastity Chavarria. If you have any questions after today's visit, please call [phone].

## 2018-02-20 NOTE — PROGRESS NOTES
S: Alycia Mcardle is a 42 y.o. female who presents for shoulder pain    Assessment/Plan:  1. 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  Pain:    8/10; Sharp,shooting, dull, aching  Pain doesn't radiate - stays front to back   +Trauma - fell down stairs 3 weeks ago (shoe slipped on stair) and landed on ribs; went to ED and dx with bruised ribs    Limited ROM  No numbness/Tingling  No hx of similar injury  No past surgeries    Social History:  Smoker: 1 pack every 3 days - stretching out packs 3 packs a week and cutting down   Occupation: not currently employed  Exercise: Completed PT for knee pain - feels stronger but needs to continue w/exercise    Review of Systems:  - Constitutional Symptoms: no fevers, chills, weight loss  - Cardiovascular: no chest pain or palpitations  - Respiratory: no cough + shortness of breath due to shoulder pain  - Integumentary: no bruising or rashs  - Neurological: no numbness or tingling    I reviewed the following:  Past Medical History:   Diagnosis Date    Arthritis     Back pain     Depression     Eczema     H/O: hysterectomy 2016    ovaries intact       Current Outpatient Prescriptions   Medication Sig Dispense Refill    escitalopram oxalate (LEXAPRO) 20 mg tablet Take 1 Tab by mouth daily. 30 Tab 3    buPROPion XL (WELLBUTRIN XL) 150 mg tablet TAKE 1 TAB BY MOUTH EVERY MORNING.  INDICATIONS: ANXIETY WITH DEPRESSION, SMOKING CESSATION 90 Tab 3    metFORMIN ER (GLUCOPHAGE XR) 500 mg tablet TAKE 2 TABLETS BY MOUTH EVERY DAY WITH EVENING MEAL 90 Tab 3    simvastatin (ZOCOR) 20 mg tablet Take 1 Tab by mouth nightly. Indications: hyperlipidemia 90 Tab 3    diclofenac (VOLTAREN) 1 % gel Apply 4 g to affected area four (4) times daily. 100 g 2    METHOCARBAMOL (ROBAXIN PO) Take  by mouth every six (6) hours as needed. Unknown dosage      ERGOCALCIFEROL, VITAMIN D2, (VITAMIN D2 PO) Take  by mouth daily.  betamethasone dipropionate (DIPROLENE) 0.05 % ointment Apply  to affected area two (2) times a day. 15 g 0       Allergies   Allergen Reactions    Bactrim [Sulfamethoprim Ds] Hives    Codeine Itching    Pcn [Penicillins] Hives    Pcn [Penicillins] Nausea and Vomiting    Sulfur Hives        O: VS:   Visit Vitals    /72 (BP 1 Location: Right arm, BP Patient Position: Sitting)    Pulse 66    Temp 97.6 °F (36.4 °C) (Oral)    Resp 20    Ht 5' 11\" (1.803 m)    Wt 281 lb 8 oz (127.7 kg)    LMP 11/24/2015 (Exact Date)    SpO2 98%    BMI 39.26 kg/m2       GENERAL Alexia Curly is in no acute distress, winces with upper body movement. Non-toxic. Well nourished. Well developed. Appropriately groomed. RESP: Breath sounds are symmetrical bilaterally. Unlabored without SOB. Speaking in full sentences. Clear to auscultation bilaterally anteriorly and posteriorly. No wheezes. No rales or rhonchi. CV: normal rate. Regular rhythm. S1, S2 audible. No murmur noted. No rubs, clicks or gallops noted. MUSC:  Intact x 4 extremities. Shoulder: Inspection and Palpation: no deformity, muscle atrophy, erythema, edema or ecchymosis noted. ROM: Decreased movement. No crepitus noted. Strength: 4/5   Rotator Cuff:   Jaimee/Empty Can negative  External Rotation Resistance: negative  Gerbers Lift Off - positive  Drop Arm:  negative  Impingement/bursitis:   Neer's test: - negative  Early positive  Crank Test -positive  NEURO: awake, alert and oriented to person, place, and time and event. Sensation is intact to light touch bilaterally.    HEME/LYMPH: peripheral pulses palpable 2+ x 4 extremities. SKIN: Skin is warm and dry. Turgor is normal. No petechiae, no purpura, no rash. No cyanosis. No jaundice or pallor. PSYCH: appropriate behavior, dress and thought processes. Good eye contact. Clear and coherent speech.  _____________________________________________________________________  Patient education was done. Advised on nutrition, physical activity, tobacco, alcohol and safety. Counseling included discussion of diagnosis, differentials, treatment options, prescribed treatment, warning signs and follow up. Medication risks/benefits, interactions and alternatives discussed with patient.      Patient verbalized understanding and agreed to plan of care. Patient was given an after visit summary which included current diagnoses, medications and vital signs. Follow up as directed or if symptoms progress.

## 2018-05-21 RX ORDER — ESCITALOPRAM OXALATE 20 MG/1
TABLET ORAL
Qty: 30 TAB | Refills: 3 | Status: SHIPPED | OUTPATIENT
Start: 2018-05-21 | End: 2020-10-14 | Stop reason: ALTCHOICE

## 2018-06-15 DIAGNOSIS — R73.03 PREDIABETES: ICD-10-CM

## 2018-06-18 RX ORDER — METFORMIN HYDROCHLORIDE 500 MG/1
TABLET, EXTENDED RELEASE ORAL
Qty: 90 TAB | Refills: 3 | Status: SHIPPED | OUTPATIENT
Start: 2018-06-18 | End: 2020-09-05 | Stop reason: SDUPTHER

## 2018-07-11 NOTE — ANCILLARY DISCHARGE INSTRUCTIONS
[facility]  [address]  [place], [address] [facility]  Phone: [phone]  Fax: [phone]    Discharge Summary  2-15    Patient name: Doar Blanca  : 1980  Provider#:[id number]  Referral source: Oliver Goncalves MD      Medical/Treatment Diagnosis: Left knee pain [M25.562]     Prior Hospitalization: see medical history     Comorbidities: see evaluation  Prior Level of Function:see evaluation  Medications: Verified on Patient Summary List    Start of Care: 18      Onset Date:see evaluation   Visits from Start of Care: 2     Missed Visits: see CC  Reporting Period : 18 to 1/15/18    Summary of care:   Pt has either cancelled for failed to show for all future appointments. Unable to assess goals. D/C from PT.       RECOMMENDATIONS:  [x]Discontinue therapy: []Patient has reached or is progressing toward set goals      [x]Patient is non-compliant or has abdicated      []Due to lack of appreciable progress towards set [facility], PT 2018 10:49 AM

## 2018-11-07 RX ORDER — SIMVASTATIN 20 MG/1
20 TABLET, FILM COATED ORAL
Qty: 90 TAB | Refills: 1 | Status: SHIPPED | OUTPATIENT
Start: 2018-11-07 | End: 2019-05-26 | Stop reason: SDUPTHER

## 2018-11-07 NOTE — TELEPHONE ENCOUNTER
PCP: Efrain Baumann NP    Last appt: 3/20/2018  No future appointments. Requested Prescriptions     Pending Prescriptions Disp Refills    simvastatin (ZOCOR) 20 mg tablet 90 Tab 3     Sig: Take 1 Tab by mouth nightly.        Prior labs and Blood pressures:  BP Readings from Last 3 Encounters:   02/20/18 136/72   12/12/17 118/65   12/07/17 123/63     Lab Results   Component Value Date/Time    Sodium 140 10/26/2017 11:20 AM    Potassium 4.4 10/26/2017 11:20 AM    Chloride 101 10/26/2017 11:20 AM    CO2 27 10/26/2017 11:20 AM    Anion gap 8 07/15/2016 10:55 PM    Glucose 94 10/26/2017 11:20 AM    BUN 11 10/26/2017 11:20 AM    Creatinine 0.82 10/26/2017 11:20 AM    BUN/Creatinine ratio 13 10/26/2017 11:20 AM    GFR est  10/26/2017 11:20 AM    GFR est non- 10/26/2017 11:20 AM    Calcium 8.6 (L) 10/26/2017 11:20 AM     Lab Results   Component Value Date/Time    Hemoglobin A1c 6.1 (H) 10/26/2017 11:20 AM     Lab Results   Component Value Date/Time    Cholesterol, total 193 10/26/2017 11:20 AM    HDL Cholesterol 33 (L) 10/26/2017 11:20 AM    LDL, calculated 130 (H) 10/26/2017 11:20 AM    VLDL, calculated 30 10/26/2017 11:20 AM    Triglyceride 148 10/26/2017 11:20 AM     No results found for: Kirit Pacheco, XQVID3, XQVID, VD3RIA    No results found for: TSH, TSH2, TSH3, TSHP, TSHEXT

## 2019-01-11 NOTE — TELEPHONE ENCOUNTER
Requested Prescriptions     Pending Prescriptions Disp Refills    buPROPion XL (WELLBUTRIN XL) 150 mg tablet 90 Tab 3     Sig: TAKE 1 TAB BY MOUTH EVERY MORNING.  INDICATIONS: ANXIETY WITH DEPRESSION, SMOKING CESSATION     Pharmacy is requesting authorization or addt'l

## 2019-01-15 RX ORDER — BUPROPION HYDROCHLORIDE 150 MG/1
TABLET ORAL
Qty: 90 TAB | Refills: 0 | Status: SHIPPED | OUTPATIENT
Start: 2019-01-15 | End: 2020-10-16 | Stop reason: SDUPTHER

## 2020-02-28 ENCOUNTER — HOSPITAL ENCOUNTER (EMERGENCY)
Age: 40
Discharge: HOME | End: 2020-02-28
Attending: EMERGENCY MEDICINE
Payer: MEDICAID

## 2020-02-28 ENCOUNTER — APPOINTMENT (OUTPATIENT)
Dept: GENERAL RADIOLOGY | Age: 40
End: 2020-02-28
Attending: EMERGENCY MEDICINE
Payer: MEDICAID

## 2020-02-28 VITALS
HEIGHT: 71 IN | HEART RATE: 70 BPM | WEIGHT: 293 LBS | BODY MASS INDEX: 41.02 KG/M2 | OXYGEN SATURATION: 98 % | DIASTOLIC BLOOD PRESSURE: 78 MMHG | SYSTOLIC BLOOD PRESSURE: 142 MMHG | TEMPERATURE: 98 F | RESPIRATION RATE: 16 BRPM

## 2020-02-28 DIAGNOSIS — G89.29 CHRONIC LEFT SHOULDER PAIN: Primary | ICD-10-CM

## 2020-02-28 DIAGNOSIS — M25.512 CHRONIC LEFT SHOULDER PAIN: Primary | ICD-10-CM

## 2020-02-28 PROCEDURE — 99283 EMERGENCY DEPT VISIT LOW MDM: CPT

## 2020-02-28 PROCEDURE — 96372 THER/PROPH/DIAG INJ SC/IM: CPT

## 2020-02-28 PROCEDURE — 74011250636 HC RX REV CODE- 250/636: Performed by: EMERGENCY MEDICINE

## 2020-02-28 PROCEDURE — 73030 X-RAY EXAM OF SHOULDER: CPT

## 2020-02-28 RX ORDER — KETOROLAC TROMETHAMINE 30 MG/ML
30 INJECTION, SOLUTION INTRAMUSCULAR; INTRAVENOUS
Status: COMPLETED | OUTPATIENT
Start: 2020-02-28 | End: 2020-02-28

## 2020-02-28 RX ORDER — PREDNISONE 20 MG/1
60 TABLET ORAL DAILY
Qty: 15 TAB | Refills: 0 | Status: SHIPPED | OUTPATIENT
Start: 2020-02-28 | End: 2020-03-04

## 2020-02-28 RX ORDER — DICLOFENAC POTASSIUM 50 MG/1
50 TABLET, FILM COATED ORAL 3 TIMES DAILY
COMMUNITY
End: 2020-11-27 | Stop reason: SDUPTHER

## 2020-02-28 RX ADMIN — KETOROLAC TROMETHAMINE 30 MG: 30 INJECTION, SOLUTION INTRAMUSCULAR; INTRAVENOUS at 14:15

## 2020-02-28 NOTE — ED TRIAGE NOTES
Patient arriving with c/o LEFT shoulder pain starting when she woke up this morning, reports a loss of sensation over the back of her right arm. Denies injury or trauma. Patient reports decreased ROM, \"I think I popped my collarbone out'.

## 2020-02-28 NOTE — ED NOTES
The patient was discharged home by Dr Ricardo Fernandez in stable condition. The patient is alert and oriented, in no respiratory distress and discharge vital signs obtained. The patient's diagnosis, condition and treatment were explained. The patient expressed understanding. 1 prescription sent to [facility].  A discharge plan has been developed. Aftercare instructions were given. Pt ambulatory out of the ED.

## 2020-02-28 NOTE — DISCHARGE INSTRUCTIONS
Patient Education        Joint Pain: Care Instructions  Your Care Instructions    Many people have small aches and pains from overuse or injury to muscles and joints. Joint injuries often happen during sports or recreation, work tasks, or projects around the home. An overuse injury can happen when you put too much stress on a joint or when you do an activity that stresses the joint over and over, such as using the computer or rowing a boat. You can take action at home to help your muscles and joints get better. You should feel better in 1 to 2 weeks, but it can take 3 months or more to heal completely. Follow-up care is a key part of your treatment and safety. Be sure to make and go to all appointments, and call your doctor if you are having problems. It's also a good idea to know your test results and keep a list of the medicines you take. How can you care for yourself at home? · Do not put weight on the injured joint for at least a day or two. · For the first day or two after an injury, do not take hot showers or baths, and do not use hot packs. The heat could make swelling worse. · Put ice or a cold pack on the sore joint for 10 to 20 minutes at a time. Try to do this every 1 to 2 hours for the next 3 days (when you are awake) or until the swelling goes down. Put a thin cloth between the ice and your skin. · Wrap the injury in an elastic bandage. Do not wrap it too tightly because this can cause more swelling. · Prop up the sore joint on a pillow when you ice it or anytime you sit or lie down during the next 3 days. Try to keep it above the level of your heart. This will help reduce swelling. · Take an over-the-counter pain medicine, such as acetaminophen (Tylenol), ibuprofen (Advil, Motrin), or naproxen (Aleve). Read and follow all instructions on the label. · After 1 or 2 days of rest, begin moving the joint gently.  While the joint is still healing, you can begin to exercise using activities that do not strain or hurt the painful joint. When should you call for help? Call your doctor now or seek immediate medical care if:    · You have signs of infection, such as:  ? Increased pain, swelling, warmth, and redness. ? Red streaks leading from the joint. ? A fever.    Watch closely for changes in your health, and be sure to contact your doctor if:    · Your movement or symptoms are not getting better after 1 to 2 weeks of home treatment. Where can you learn more? Go to http://debbie-juan jose.info/. Enter P205 in the search box to learn more about \"Joint Pain: Care Instructions. \"  Current as of: June 26, 2019  Content Version: 12.2  © 9830-8638 [employer]. Care instructions adapted under license by [employer] (which disclaims liability or warranty for this information). If you have questions about a medical condition or this instruction, always ask your healthcare professional. [place] [address] any warranty or liability for your use of this information. Patient Education        Shoulder Arthritis: Exercises  Introduction  Here are some examples of exercises for you to try. The exercises may be suggested for a condition or for rehabilitation. Start each exercise slowly. Ease off the exercises if you start to have pain. You will be told when to start these exercises and which ones will work best for you. How to do the exercises  Shoulder flexion (lying down)    1. Lie on your back, holding a wand with both hands. Your palms should face down as you hold the wand. 2. Keeping your elbows straight, slowly raise your arms over your head. Raise them until you feel a stretch in your shoulders, upper back, and chest.  3. Hold for 15 to 30 seconds. 4. Repeat 2 to 4 times. Shoulder rotation (lying down)    1. Lie on your back. Hold a wand with both hands with your elbows bent and palms up.   2. Keep your elbows close to your body, and move the wand across your body toward the sore arm. 3. Hold for 8 to 12 seconds. 4. Repeat 2 to 4 times. Shoulder internal rotation with towel    1. Hold a towel above and behind your head with the arm that is not sore. 2. With your sore arm, reach behind your back and grasp the towel. 3. With the arm above your head, pull the towel upward. Do this until you feel a stretch on the front and outside of your sore shoulder. 4. Hold 15 to 30 seconds. 5. Repeat 2 to 4 times. Shoulder blade squeeze    1. Stand with your arms at your sides, and squeeze your shoulder blades together. Do not raise your shoulders up as you squeeze. 2. Hold 6 seconds. 3. Repeat 8 to 12 times. Resisted rows    1. Put the band around a solid object at about waist level. (A bedpost will work well.) Each hand should hold an end of the band. 2. With your elbows at your sides and bent to 90 degrees, pull the band back. Your shoulder blades should move toward each other. Return to the starting position. 3. Repeat 8 to 12 times. External rotator strengthening exercise    1. Start by tying a piece of elastic exercise material to a doorknob. You can use surgical tubing or Thera-Band. (You may also hold one end of the band in each hand.)  2. Stand or sit with your shoulder relaxed and your elbow bent 90 degrees. Your upper arm should rest comfortably against your side. Squeeze a rolled towel between your elbow and your body for comfort. This will help keep your arm at your side. 3. Hold one end of the elastic band with the hand of the painful arm. 4. Start with your forearm across your belly. Slowly rotate the forearm out away from your body. Keep your elbow and upper arm tucked against the towel roll or the side of your body until you begin to feel tightness in your shoulder. Slowly move your arm back to where you started. 5. Repeat 8 to 12 times. Internal rotator strengthening exercise    1.  Start by tying a piece of elastic exercise material to a doorknob. You can use surgical tubing or Thera-Band. 2. Stand or sit with your shoulder relaxed and your elbow bent 90 degrees. Your upper arm should rest comfortably against your side. Squeeze a rolled towel between your elbow and your body for comfort. This will help keep your arm at your side. 3. Hold one end of the elastic band in the hand of the painful arm. 4. Slowly rotate your forearm toward your body until it touches your belly. Slowly move it back to where you started. 5. Keep your elbow and upper arm firmly tucked against the towel roll or at your side. 6. Repeat 8 to 12 times. Pendulum swing    1. Hold on to a table or the back of a chair with your good arm. Then bend forward a little and let your sore arm hang straight down. This exercise does not use the arm muscles. Rather, use your legs and your hips to create movement that makes your arm swing freely. 2. Use the movement from your hips and legs to guide the slightly swinging arm back and forth like a pendulum (or elephant trunk). Then guide it in circles that start small (about the size of a dinner plate). Make the circles a bit larger each day, as your pain allows. 3. Do this exercise for 5 minutes, 5 to 7 times each day. 4. As you have less pain, try bending over a little farther to do this exercise. This will increase the amount of movement at your shoulder. Follow-up care is a key part of your treatment and safety. Be sure to make and go to all appointments, and call your doctor if you are having problems. It's also a good idea to know your test results and keep a list of the medicines you take. Where can you learn more? Go to http://debbie-juan jose.info/. Enter H562 in the search box to learn more about \"Shoulder Arthritis: Exercises. \"  Current as of: June 26, 2019  Content Version: 12.2  © 5708-8924 [employer].  Care instructions adapted under license by [employer] (which disclaims liability or warranty for this information). If you have questions about a medical condition or this instruction, always ask your healthcare professional. [address] any warranty or liability for your use of this information.

## 2020-02-28 NOTE — ED PROVIDER NOTES
The history is provided by the patient. No  was used. Shoulder Pain    The incident occurred yesterday. There was no injury mechanism. The left shoulder is affected. The pain is at a severity of 9/10. The pain is severe. The pain has been constant since onset. The pain radiates. There is no history of shoulder injury. She has no other injuries. There is no history of shoulder surgery. Associated symptoms include numbness, muscle weakness and tingling. Past Medical History:   Diagnosis Date    Arthritis     Back pain     Depression     Eczema     H/O: hysterectomy 2016    ovaries intact       Past Surgical History:   Procedure Laterality Date    HX GYN      HX ORTHOPAEDIC      left 5th toe repair    HX TUBAL LIGATION           Family History:   Problem Relation Age of Onset    Cancer Father     Heart Disease Father        Social History     Socioeconomic History    Marital status:      Spouse name: Not on file    Number of children: Not on file    Years of education: Not on file    Highest education level: Not on file   Occupational History    Not on file   Social Needs    Financial resource strain: Not on file    Food insecurity:     Worry: Not on file     Inability: Not on file    Transportation needs:     Medical: Not on file     Non-medical: Not on file   Tobacco Use    Smoking status: Current Every Day Smoker     Packs/day: 0.50     Years: 12.00     Pack years: 6.00    Smokeless tobacco: Never Used   Substance and Sexual Activity    Alcohol use:  Yes     Alcohol/week: 1.0 standard drinks     Types: 1 Glasses of wine per week    Drug use: No    Sexual activity: Yes     Partners: Male   Lifestyle    Physical activity:     Days per week: Not on file     Minutes per session: Not on file    Stress: Not on file   Relationships    Social connections:     Talks on phone: Not on file     Gets together: Not on file     Attends Jainism service: Not on file Active member of club or organization: Not on file     Attends meetings of clubs or organizations: Not on file     Relationship status: Not on file    Intimate partner violence:     Fear of current or ex partner: Not on file     Emotionally abused: Not on file     Physically abused: Not on file     Forced sexual activity: Not on file   Other Topics Concern    Not on file   Social History Narrative    ** Merged History Encounter **              ALLERGIES: Bactrim [sulfamethoprim ds]; Codeine; Pcn [penicillins]; Pcn [penicillins]; and Sulfur    Review of Systems   Constitutional: Negative for activity change, chills and fever. HENT: Negative for nosebleeds, sore throat, trouble swallowing and voice change. Eyes: Negative for visual disturbance. Respiratory: Negative for shortness of breath. Cardiovascular: Negative for chest pain and palpitations. Gastrointestinal: Negative for abdominal pain, constipation, diarrhea and nausea. Genitourinary: Negative for difficulty urinating, dysuria, hematuria and urgency. Musculoskeletal: Positive for arthralgias. Negative for back pain, neck pain and neck stiffness. Skin: Negative for color change. Allergic/Immunologic: Negative for immunocompromised state. Neurological: Positive for tingling and numbness. Negative for dizziness, seizures, syncope, weakness, light-headedness and headaches. Psychiatric/Behavioral: Negative for behavioral problems, confusion, hallucinations, self-injury and suicidal ideas. Vitals:    02/28/20 1316   BP: 151/81   Pulse: 71   Resp: 16   Temp: 97.5 °F (36.4 °C)   SpO2: 97%   Weight: 133.4 kg (294 lb 1.5 oz)   Height: 5' 11\" (1.803 m)            Physical Exam  Vitals signs and nursing note reviewed. Constitutional:       General: She is not in acute distress. Appearance: She is well-developed. She is not diaphoretic. HENT:      Head: Atraumatic. Neck:      Trachea: No tracheal deviation.    Cardiovascular: Comments: Warm and well perfused  Pulmonary:      Effort: Pulmonary effort is normal. No respiratory distress. Musculoskeletal:      Left shoulder: She exhibits decreased range of motion, tenderness, bony tenderness and pain. She exhibits no swelling, no effusion, no crepitus, no deformity, no spasm, normal pulse and normal strength. Skin:     General: Skin is warm and dry. Neurological:      Mental Status: She is alert. Coordination: Coordination normal.   Psychiatric:         Behavior: Behavior normal.         Thought Content: Thought content normal.         Judgment: Judgment normal.          MDM     This is a 68-year-old female with past medical history, review of systems, physical exam as above, presenting with complaints of acute on chronic left shoulder pain. Patient states pain developed yesterday evening, endorses previous left shoulder pain, evaluation by orthopedics and states pain was described as being cervical in nature. She denies neck pain at the time of evaluation. She states she has been taking diclofenac, without relief. She denies new falls, or other trauma. Chart review also indicates primary care evaluations with diagnosis of bursitis. Physical exam is remarkable for well-appearing middle-aged female, in no acute distress, with decreased range of motion of the left shoulder, without crepitus, or deformity, or erythema. Plan to provide pain control, obtain plain films, we will reassess, and make a disposition. Procedures    Update:  No acute findings on plain films. Plan to provide prednisone burst as this has helped her bursitis in the past.  We will give her primary care follow-up as well as orthopedics. Return precautions given.

## 2020-03-05 ENCOUNTER — OFFICE VISIT (OUTPATIENT)
Dept: FAMILY MEDICINE CLINIC | Age: 40
End: 2020-03-05

## 2020-03-05 VITALS
HEART RATE: 70 BPM | WEIGHT: 291 LBS | RESPIRATION RATE: 16 BRPM | HEIGHT: 71 IN | SYSTOLIC BLOOD PRESSURE: 137 MMHG | TEMPERATURE: 97.9 F | DIASTOLIC BLOOD PRESSURE: 77 MMHG | BODY MASS INDEX: 40.74 KG/M2 | OXYGEN SATURATION: 97 %

## 2020-03-05 DIAGNOSIS — M54.2 NECK PAIN: Primary | ICD-10-CM

## 2020-03-05 DIAGNOSIS — G89.29 CHRONIC LEFT SHOULDER PAIN: ICD-10-CM

## 2020-03-05 DIAGNOSIS — M25.512 CHRONIC LEFT SHOULDER PAIN: ICD-10-CM

## 2020-03-05 RX ORDER — CYCLOBENZAPRINE HCL 10 MG
10 TABLET ORAL
Qty: 30 TAB | Refills: 0 | Status: SHIPPED | OUTPATIENT
Start: 2020-03-05 | End: 2021-02-05 | Stop reason: ALTCHOICE

## 2020-03-05 NOTE — PROGRESS NOTES
Identified pt with two pt identifiers(name and ). Reviewed record in preparation for visit and have obtained necessary documentation. Chief Complaint   Patient presents with    ED Follow-up     [facility] 2020 for shoulder pain        Health Maintenance Due   Topic    Pneumococcal 0-64 years (1 of 1 - PPSV23)    PAP AKA CERVICAL CYTOLOGY     Influenza Age 5 to Adult         Visit Vitals  /77 (BP 1 Location: Left arm, BP Patient Position: Sitting)   Pulse 70   Temp 97.9 °F (36.6 °C) (Oral)   Resp 16   Ht 5' 11\" (1.803 m)   Wt 291 lb (132 kg)   LMP 2015 (Exact Date)   SpO2 97%   BMI 40.59 kg/m²     Pain Scale: 3/10    Coordination of Care Questionnaire:  :   1. Have you been to the ER, urgent care clinic since your last visit? Hospitalized since your last visit? See reason for visit    2. Have you seen or consulted any other health care providers outside of the [address] [place] [address] since your last visit? Include any pap smears or colon screening.  unsure

## 2020-03-05 NOTE — PATIENT INSTRUCTIONS
1) Muscle Pain - Supportive Therapy: For acute pain: rest, intermittent application of cold packs (frozen peas make good ice pack) 10 minutes on, 10 minutes off. This reduces inflammation. After 48 hour, switch to moist heat, which brings more blood flow to the area and loosen muscles. (If it feels good, can alternate cold and moist heat in first 48 hours.)     Moist heat (using the shower, bath, moist cloth, no dry heat packs) penetrates better - apply to affected area three times a day for 20 minutes to help relax the muscles. Epsom salt soak: put 2 cups of epsom salt (2 cups) in bath, if you are safe getting in and out of tub. Soak for 20 minutes. ([employer] carries a good epsom salt product by Dr. Sharif Hyman and derek for about $6)    Alternatively, you can put 1 cup of epsom salt in 1 quart of warm water and soak a washcloth in solution. Apply to area of pain 2-3x day. Relative rest -  Avoid movements that stress area of injury.  -Can use tennis balls to massage sore/tight muscles by laying on floor or using wall to apply pressure. Make sure you are using equal pressure (ie a tennis ball on either side of spine) at same time. Do not put too much pressure on areas of pain. Please do stretching and strengthening exercises as you are able. Alternate 800mg Ibuprofen with Tylenol every 4 hours as needed for pain and discomfort. Make sure to take Ibuprofen with food as it can cause stomach upset. Do not exceed 3000 mg Tylenol per day. Flexeril 10 mg (a muscle relaxer) has been prescribed. Please take 1/2 tab to 1 tab up to every 6 hours for pain or once a day before bedtime. This medication may cause drowsiness. Make an appt with Dr. Louie Bonds for back and Dr. La Bianchi for the left shoulder if not improving.     Weight loss - 1 pound of body weigh is equal to 5 pounds on joints, so weight loss will help with joint pain    Return to clinic if these symptoms worsen or fail to improve as anticipated. Referral to Dr. Joaquin Walden for neck pain and Dr. Jennifer Galarza for shoulder pain     2) Allergy management:     1)  Take a daily antihistamine to reduce allergic symptoms such as sneezing, runny nose and itchy eyes. You can buy these over the counter (OTC = no prescription needed) such as Claritin, Allegra or Zyrtec. Take this daily as it takes 3-4 weeks for the full therapeutic effect to take place. Follow directions on package. If symptoms of sneezing, coughing and runny nose are severe, you can try taking Benadryl at night before bed. However, Benadryl can cause drowsiness, so please use caution. Elderly people should not use Benadryl due to side effects and increase risk of falling. 2)  Use an OTC decongestant nasal spray such as Flonase, Nasonex, or Rhinocort. These contain a steroid and will help reduce congestion for 3-5 days. You can spray 2x in each nostril two times a day. Use the opposite hand of the nostril you are spraying and look down at your toes when you administer the nasal spray. This ensures the spray is applied to the nasal tissue properly. If you use Afrin for nasal congestion, DO NOT use for more than 5 days (due to rebound congestion)     3) You can also use a saline nasal spray 3-4 times a day or a royal potty (never use tap water due to possible bacterial contamination) to help flush out the sinuses. This helps reduce the irritants that can increase allergic symptoms. 4)  OTC Robitussin or Delsym for cough relief. Follow directions on package. Do not exceed maximum dose. May cause drowsiness. If you have high blood pressure or kidney problems, avoid cough medicines that contain decongestants -- such as pseudoephedrine, ephedrine, phenylephrine, naphazoline and oxymetazoline.     5) Warm salt water gargle can help alleviate sore throat    6) Try taking a teaspoon of local honey 2x day (but no more than 1 tablespoon a day) - to help strengthen your body's tolerance to allergens. It is important to buy local honey as the bees use plants in your area to produce their honey. Honey is also a natural cough suppressant. Eat a healthy diet, drink plenty of water and get 8 hours of sleep nightly. Neck: Exercises  Introduction  Here are some examples of exercises for you to try. The exercises may be suggested for a condition or for rehabilitation. Start each exercise slowly. Ease off the exercises if you start to have pain. You will be told when to start these exercises and which ones will work best for you. How to do the exercises  Neck stretch    1. This stretch works best if you keep your shoulder down as you lean away from it. To help you remember to do this, start by relaxing your shoulders and lightly holding on to your thighs or your chair. 2. Tilt your head toward your shoulder and hold for 15 to 30 seconds. Let the weight of your head stretch your muscles. 3. If you would like a little added stretch, use your hand to gently and steadily pull your head toward your shoulder. For example, keeping your right shoulder down, lean your head to the left. 4. Repeat 2 to 4 times toward each shoulder. Diagonal neck stretch    1. Turn your head slightly toward the direction you will be stretching, and tilt your head diagonally toward your chest and hold for 15 to 30 seconds. 2. If you would like a little added stretch, use your hand to gently and steadily pull your head forward on the diagonal.  3. Repeat 2 to 4 times toward each side. Dorsal glide stretch    1. Sit or stand tall and look straight ahead. 2. Slowly tuck your chin as you glide your head backward over your body  3. Hold for a count of 6, and then relax for up to 10 seconds. 4. Repeat 8 to 12 times. Chest and shoulder stretch    1. Sit or stand tall and glide your head backward as in the dorsal glide stretch. 2. Raise both arms so that your hands are next to your ears.   3. Take a deep breath, and as you breathe out, lower your elbows down and behind your back. You will feel your shoulder blades slide down and together, and at the same time you will feel a stretch across your chest and the front of your shoulders. 4. Hold for about 6 seconds, and then relax for up to 10 seconds. 5. Repeat 8 to 12 times. Strengthening: Hands on head    1. Move your head backward, forward, and side to side against gentle pressure from your hands, holding each position for about 6 seconds. 2. Repeat 8 to 12 times. Follow-up care is a key part of your treatment and safety. Be sure to make and go to all appointments, and call your doctor if you are having problems. It's also a good idea to know your test results and keep a list of the medicines you take. Where can you learn more? Go to http://debbie-juan jose.info/. Enter P975 in the search box to learn more about \"Neck: Exercises. \"  Current as of: June 26, 2019  Content Version: 12.2  © 4939-0233 [employer]. Care instructions adapted under license by [employer] (which disclaims liability or warranty for this information). If you have questions about a medical condition or this instruction, always ask your healthcare professional. [address] any warranty or liability for your use of this information. Rotator Cuff: Exercises  Introduction  Here are some examples of exercises for you to try. The exercises may be suggested for a condition or for rehabilitation. Start each exercise slowly. Ease off the exercises if you start to have pain. You will be told when to start these exercises and which ones will work best for you. How to do the exercises  Pendulum swing    1. Hold on to a table or the back of a chair with your good arm. Then bend forward a little and let your sore arm hang straight down. This exercise does not use the arm muscles.  Rather, use your legs and your hips to create movement that makes your arm swing freely. 2. Use the movement from your hips and legs to guide the slightly swinging arm back and forth like a pendulum (or elephant trunk). Then guide it in circles that start small (about the size of a dinner plate). Make the circles a bit larger each day, as your pain allows. 3. Do this exercise for 5 minutes, 5 to 7 times each day. 4. As you have less pain, try bending over a little farther to do this exercise. This will increase the amount of movement at your shoulder. Posterior stretching exercise    1. Hold the elbow of your injured arm with your other hand. 2. Use your hand to pull your injured arm gently up and across your body. You will feel a gentle stretch across the back of your injured shoulder. 3. Hold for at least 15 to 30 seconds. Then slowly lower your arm. 4. Repeat 2 to 4 times. Up-the-back stretch    1. Put your hand in your back pocket. Let it rest there to stretch your shoulder. 2. With your other hand, hold your injured arm (palm outward) behind your back by the wrist. Pull your arm up gently to stretch your shoulder. 3. Next, put a towel over your other shoulder. Put the hand of your injured arm behind your back. Now hold the back end of the towel. With the other hand, hold the front end of the towel in front of your body. Pull gently on the front end of the towel. This will bring your hand farther up your back to stretch your shoulder. Overhead stretch    1. Standing about an arm's length away, grasp onto a solid surface. You could use a countertop, a doorknob, or the back of a sturdy chair. 2. With your knees slightly bent, bend forward with your arms straight. Lower your upper body, and let your shoulders stretch. 3. As your shoulders are able to stretch farther, you may need to take a step or two backward. 4. Hold for at least 15 to 30 seconds. Then stand up and relax.  If you had stepped back during your stretch, step forward so you can keep your hands on the solid surface. 5. Repeat 2 to 4 times. Shoulder flexion (lying down)    1. Lie on your back, holding a wand with both hands. Your palms should face down as you hold the wand. 2. Keeping your elbows straight, slowly raise your arms over your head. Raise them until you feel a stretch in your shoulders, upper back, and chest.  3. Hold for 15 to 30 seconds. 4. Repeat 2 to 4 times. Shoulder rotation (lying down)    1. Lie on your back. Hold a wand with both hands with your elbows bent and palms up. 2. Keep your elbows close to your body, and move the wand across your body toward the sore arm. 3. Hold for 8 to 12 seconds. 4. Repeat 2 to 4 times. Wall climbing (to the side)    1. Stand with your side to a wall so that your fingers can just touch it at an angle about 30 degrees toward the front of your body. 2. Walk the fingers of your injured arm up the wall as high as pain permits. Try not to shrug your shoulder up toward your ear as you move your arm up. 3. Hold that position for a count of at least 15 to 20.  4. Walk your fingers back down to the starting position. 5. Repeat at least 2 to 4 times. Try to reach higher each time. Wall climbing (to the front)    1. Face a wall, and stand so your fingers can just touch it. 2. Keeping your shoulder down, walk the fingers of your injured arm up the wall as high as pain permits. (Don't shrug your shoulder up toward your ear.)  3. Hold your arm in that position for at least 15 to 30 seconds. 4. Slowly walk your fingers back down to where you started. 5. Repeat at least 2 to 4 times. Try to reach higher each time. Shoulder blade squeeze    1. Stand with your arms at your sides, and squeeze your shoulder blades together. Do not raise your shoulders up as you squeeze. 2. Hold 6 seconds. 3. Repeat 8 to 12 times. Scapular exercise: Arm reach    1. Lie flat on your back.  This exercise is a very slight motion that starts with your arms raised (elbows straight, arms straight). 2. From this position, reach higher toward the neelam or ceiling. Keep your elbows straight. All motion should be from your shoulder blade only. 3. Relax your arms back to where you started. 4. Repeat 8 to 12 times. Arm raise to the side    1. Slowly raise your injured arm to the side, with your thumb facing up. Raise your arm 60 degrees at the most (shoulder level is 90 degrees). 2. Hold the position for 3 to 5 seconds. Then lower your arm back to your side. If you need to, bring your \"good\" arm across your body and place it under the elbow as you lower your injured arm. Use your good arm to keep your injured arm from dropping down too fast.  3. Repeat 8 to 12 times. 4. When you first start out, don't hold any extra weight in your hand. As you get stronger, you may use a 1-pound to 2-pound dumbbell or a small can of food. Shoulder flexor and extensor exercise    1. Push forward (flex): Stand facing a wall or doorjamb, about 6 inches or less back. Hold your injured arm against your body. Make a closed fist with your thumb on top. Then gently push your hand forward into the wall with about 25% to 50% of your strength. Don't let your body move backward as you push. Hold for about 6 seconds. Relax for a few seconds. Repeat 8 to 12 times. 2. Push backward (extend): Stand with your back flat against a wall. Your upper arm should be against the wall, with your elbow bent 90 degrees (your hand straight ahead). Push your elbow gently back against the wall with about 25% to 50% of your strength. Don't let your body move forward as you push. Hold for about 6 seconds. Relax for a few seconds. Repeat 8 to 12 times. Scapular exercise: Wall push-ups    1. Stand facing a wall, about 12 inches to 18 inches away. 2. Place your hands on the wall at shoulder height. 3. Slowly bend your elbows and bring your face to the wall.  Keep your back and hips straight. 4. Push back to where you started. 5. Repeat 8 to 12 times. 6. When you can do this exercise against a wall comfortably, you can try it against a counter. You can then slowly progress to the end of a couch, then to a sturdy chair, and finally to the floor. Scapular exercise: Retraction    1. Put the band around a solid object at about waist level. (A bedpost will work well.) Each hand should hold an end of the band. 2. With your elbows at your sides and bent to 90 degrees, pull the band back. Your shoulder blades should move toward each other. Then move your arms back where you started. 3. Repeat 8 to 12 times. 4. If you have good range of motion in your shoulders, try this exercise with your arms lifted out to the sides. Keep your elbows at a 90-degree angle. Raise the elastic band up to about shoulder level. Pull the band back to move your shoulder blades toward each other. Then move your arms back where you started. Internal rotator strengthening exercise    1. Start by tying a piece of elastic exercise material to a doorknob. You can use surgical tubing or Thera-Band. 2. Stand or sit with your shoulder relaxed and your elbow bent 90 degrees. Your upper arm should rest comfortably against your side. Squeeze a rolled towel between your elbow and your body for comfort. This will help keep your arm at your side. 3. Hold one end of the elastic band in the hand of the painful arm. 4. Slowly rotate your forearm toward your body until it touches your belly. Slowly move it back to where you started. 5. Keep your elbow and upper arm firmly tucked against the towel roll or at your side. 6. Repeat 8 to 12 times. External rotator strengthening exercise    1. Start by tying a piece of elastic exercise material to a doorknob. You can use surgical tubing or Thera-Band. (You may also hold one end of the band in each hand.)  2. Stand or sit with your shoulder relaxed and your elbow bent 90 degrees. Your upper arm should rest comfortably against your side. Squeeze a rolled towel between your elbow and your body for comfort. This will help keep your arm at your side. 3. Hold one end of the elastic band with the hand of the painful arm. 4. Start with your forearm across your belly. Slowly rotate the forearm out away from your body. Keep your elbow and upper arm tucked against the towel roll or the side of your body until you begin to feel tightness in your shoulder. Slowly move your arm back to where you started. 5. Repeat 8 to 12 times. Follow-up care is a key part of your treatment and safety. Be sure to make and go to all appointments, and call your doctor if you are having problems. It's also a good idea to know your test results and keep a list of the medicines you take. Where can you learn more? Go to http://debbie-juan jose.info/. Enter Trinidad Parikh in the search box to learn more about \"Rotator Cuff: Exercises. \"  Current as of: June 26, 2019  Content Version: 12.2  © 3626-6899 [employer]. Care instructions adapted under license by [employer] (which disclaims liability or warranty for this information). If you have questions about a medical condition or this instruction, always ask your healthcare professional. [address] any warranty or liability for your use of this information.

## 2020-03-05 NOTE — PROGRESS NOTES
S: Madalyn Cloud is a 37 y.o. female who presents for shoulder pain    Assessment/Plan:    1. Neck pain  -chronic pain, has had steroid inj in past w/some relief  -MRI 2015 = Mild central disc protrusion at C3-4. Minimal central disc bulging at C5-6.  -supportive tx instructions given  -rx: flexeril 10mg prn  - REFERRAL TO ORTHOPEDICS    2. Chronic left shoulder pain  -Jaimee/Empty Can negative; positive External Rotation Resistance, Gerbers Lift Off ; Early  -supportive instructions given  - REFERRAL TO ORTHOPEDICS       HPI:  Hasn't been into [facility] b/c insurance changed and wasn't accepted; Went to ED 2/28/20 - [place] like someone was yanking on collarbone and pulling it down -   Dx with bursitis; Given pred     Past hx of neck pain with steroid injection (2016)   MRI 2015 = Mild central disc protrusion at C3-4. Minimal central disc bulging at C5-6    Site of pain:left shoulder  + numbness/Tingling  No hx of similar injury  No past surgeries    Nose bleeds - pt had viral infection/flu 2 weeks ago  Advised flonase 3 days and saline spray 4xdaily    Social History:  Exercise: needs to improve  Nutrition: working on diet and weight loss    Social History     Tobacco Use   Smoking Status Current Every Day Smoker    Packs/day: 0.50    Years: 12.00    Pack years: 6.00   Smokeless Tobacco Never Used     Social History     Substance and Sexual Activity   Alcohol Use Yes    Alcohol/week: 1.0 standard drinks    Types: 1 Glasses of wine per week     Social History     Substance and Sexual Activity   Drug Use No       Review of Systems:  - Constitutional Symptoms: no fevers, chills, weight loss  - Cardiovascular: no chest pain or palpitations  - Respiratory: no cough or shortness of breath  - Integumentary: no bruising or rashs   ROS is negative otherwise.     3 most recent PHQ Screens 10/26/2017   Little interest or pleasure in doing things Not at all   Feeling down, depressed, irritable, or hopeless Not at all Total Score PHQ 2 0       I reviewed the following:  Past Medical History:   Diagnosis Date    Arthritis     Back pain     Depression     Eczema     H/O: hysterectomy 2016    ovaries intact       Current Outpatient Medications   Medication Sig Dispense Refill    diclofenac potassium (CATAFLAM) 50 mg tablet Take 50 mg by mouth three (3) times daily.  buPROPion XL (WELLBUTRIN XL) 150 mg tablet TAKE 1 TAB BY MOUTH EVERY MORNING. INDICATIONS: ANXIETY WITH DEPRESSION, SMOKING CESSATION 90 Tab 0    metFORMIN ER (GLUCOPHAGE XR) 500 mg tablet TAKE 2 TABLETS BY MOUTH EVERY DAY WITH EVENING MEAL 90 Tab 3    escitalopram oxalate (LEXAPRO) 20 mg tablet TAKE 1 TAB BY MOUTH DAILY. 30 Tab 3    ERGOCALCIFEROL, VITAMIN D2, (VITAMIN D2 PO) Take  by mouth daily. Allergies   Allergen Reactions    Bactrim [Sulfamethoprim Ds] Hives    Codeine Itching    Pcn [Penicillins] Hives    Pcn [Penicillins] Nausea and Vomiting    Sulfur Hives        O: VS:   Visit Vitals  /77 (BP 1 Location: Left arm, BP Patient Position: Sitting)   Pulse 70   Temp 97.9 °F (36.6 °C) (Oral)   Resp 16   Ht 5' 11\" (1.803 m)   Wt 291 lb (132 kg)   LMP 11/24/2015 (Exact Date)   SpO2 97%   BMI 40.59 kg/m²       GENERAL [place] Mari[place] is in no acute distress. Non-toxic. Well nourished. Well developed. Appropriately groomed. EYE: PERRLA. EOMs intact. Sclera anicteric without injection. No drainage or discharge. EARS: Hearing intact bilaterally. External ear canals normal without evidence of blood or swelling. Bilateral TM's intact, pearly grey with landmarks visible. No erythema or effusion. NOSE: Patent. Nasal turbinates pink. No erythema. No discharge. MOUTH: mucous membranes pink and moist. Posterior pharynx normal with cobblestone appearance. No erythema, white exudate or obstruction. NECK: supple. Midline trachea. No cervical adenopathy noted. RESP: Breath sounds are symmetrical bilaterally. Unlabored without SOB. Speaking in full sentences. Clear to auscultation bilaterally anteriorly and posteriorly. No wheezes. No rales or rhonchi. CV: normal rate. Regular rhythm. S1, S2 audible. No murmur noted. No rubs, clicks or gallops noted. MUSC:  Intact x 4 extremities. Left Shoulder: Inspection and Palpation: no deformity, muscle atrophy, erythema, edema or ecchymosis noted. No crepitus noted. Rotator Cuff:   Jaimee/Empty Can negative  External Rotation Resistance: positive  Gerbers Lift Off - positive  Early positive  NEURO: awake, alert and oriented to person, place, and time and event. Sensation is intact to light touch bilaterally. HEME/LYMPH: peripheral pulses palpable 2+ x 4 extremities. SKIN: Skin is warm and dry. Turgor is normal. No petechiae, no purpura, no rash. No cyanosis. No jaundice or pallor. PSYCH: appropriate behavior, dress and thought processes. Good eye contact. Clear and coherent speech.  _____________________________________________________________________  Patient education was done. Advised on nutrition, physical activity, tobacco, alcohol and safety. Counseling included discussion of diagnosis, differentials, treatment options, prescribed treatment, warning signs and follow up. Medication risks/benefits, interactions and alternatives discussed with patient.      Patient verbalized understanding and agreed to plan of care. Patient was given an after visit summary which included current diagnoses, medications and vital signs. Follow up as directed or if symptoms progress.

## 2020-07-13 ENCOUNTER — HOSPITAL ENCOUNTER (EMERGENCY)
Age: 40
Discharge: HOME | End: 2020-07-14
Attending: EMERGENCY MEDICINE
Payer: COMMERCIAL

## 2020-07-13 DIAGNOSIS — R00.2 PALPITATIONS: Primary | ICD-10-CM

## 2020-07-13 LAB
COMMENT, HOLDF: NORMAL
SAMPLES BEING HELD,HOLD: NORMAL

## 2020-07-13 PROCEDURE — 99285 EMERGENCY DEPT VISIT HI MDM: CPT

## 2020-07-13 PROCEDURE — 80053 COMPREHEN METABOLIC PANEL: CPT

## 2020-07-13 PROCEDURE — 36415 COLL VENOUS BLD VENIPUNCTURE: CPT

## 2020-07-13 PROCEDURE — 85025 COMPLETE CBC W/AUTO DIFF WBC: CPT

## 2020-07-13 PROCEDURE — 85379 FIBRIN DEGRADATION QUANT: CPT

## 2020-07-13 PROCEDURE — 83735 ASSAY OF MAGNESIUM: CPT

## 2020-07-13 PROCEDURE — 74011250637 HC RX REV CODE- 250/637: Performed by: EMERGENCY MEDICINE

## 2020-07-13 PROCEDURE — 93005 ELECTROCARDIOGRAM TRACING: CPT

## 2020-07-13 RX ORDER — GUAIFENESIN 100 MG/5ML
324 LIQUID (ML) ORAL
Status: COMPLETED | OUTPATIENT
Start: 2020-07-14 | End: 2020-07-13

## 2020-07-13 RX ORDER — GUAIFENESIN 100 MG/5ML
LIQUID (ML) ORAL
Status: DISCONTINUED
Start: 2020-07-13 | End: 2020-07-14 | Stop reason: HOSPADM

## 2020-07-13 RX ADMIN — ASPIRIN 81 MG CHEWABLE TABLET 324 MG: 81 TABLET CHEWABLE at 23:55

## 2020-07-14 ENCOUNTER — APPOINTMENT (OUTPATIENT)
Dept: GENERAL RADIOLOGY | Age: 40
End: 2020-07-14
Attending: EMERGENCY MEDICINE
Payer: COMMERCIAL

## 2020-07-14 VITALS
HEART RATE: 55 BPM | OXYGEN SATURATION: 95 % | WEIGHT: 293 LBS | RESPIRATION RATE: 16 BRPM | BODY MASS INDEX: 41.63 KG/M2 | TEMPERATURE: 98.2 F | SYSTOLIC BLOOD PRESSURE: 116 MMHG | DIASTOLIC BLOOD PRESSURE: 60 MMHG

## 2020-07-14 LAB
ALBUMIN SERPL-MCNC: 3.3 G/DL (ref 3.5–5)
ALBUMIN/GLOB SERPL: 1 {RATIO} (ref 1.1–2.2)
ALP SERPL-CCNC: 83 U/L (ref 45–117)
ALT SERPL-CCNC: 20 U/L (ref 12–78)
ANION GAP SERPL CALC-SCNC: 6 MMOL/L (ref 5–15)
APPEARANCE UR: CLEAR
AST SERPL-CCNC: 14 U/L (ref 15–37)
ATRIAL RATE: 64 BPM
BACTERIA URNS QL MICRO: NEGATIVE /HPF
BASOPHILS # BLD: 0.1 K/UL (ref 0–0.1)
BASOPHILS NFR BLD: 1 % (ref 0–1)
BILIRUB SERPL-MCNC: 0.1 MG/DL (ref 0.2–1)
BILIRUB UR QL: NEGATIVE
BUN SERPL-MCNC: 14 MG/DL (ref 6–20)
BUN/CREAT SERPL: 13 (ref 12–20)
CALCIUM SERPL-MCNC: 8.3 MG/DL (ref 8.5–10.1)
CALCULATED P AXIS, ECG09: 51 DEGREES
CALCULATED R AXIS, ECG10: 33 DEGREES
CALCULATED T AXIS, ECG11: 5 DEGREES
CHLORIDE SERPL-SCNC: 103 MMOL/L (ref 97–108)
CO2 SERPL-SCNC: 30 MMOL/L (ref 21–32)
COLOR UR: ABNORMAL
CREAT SERPL-MCNC: 1.05 MG/DL (ref 0.55–1.02)
D DIMER PPP FEU-MCNC: 0.44 MG/L FEU (ref 0–0.65)
DIAGNOSIS, 93000: NORMAL
DIFFERENTIAL METHOD BLD: ABNORMAL
EOSINOPHIL # BLD: 0.2 K/UL (ref 0–0.4)
EOSINOPHIL NFR BLD: 2 % (ref 0–7)
EPITH CASTS URNS QL MICRO: ABNORMAL /LPF
ERYTHROCYTE [DISTWIDTH] IN BLOOD BY AUTOMATED COUNT: 13.6 % (ref 11.5–14.5)
GLOBULIN SER CALC-MCNC: 3.3 G/DL (ref 2–4)
GLUCOSE SERPL-MCNC: 108 MG/DL (ref 65–100)
GLUCOSE UR STRIP.AUTO-MCNC: NEGATIVE MG/DL
HCG UR QL: NEGATIVE
HCT VFR BLD AUTO: 38.5 % (ref 35–47)
HGB BLD-MCNC: 11.8 G/DL (ref 11.5–16)
HGB UR QL STRIP: NEGATIVE
IMM GRANULOCYTES # BLD AUTO: 0.1 K/UL (ref 0–0.04)
IMM GRANULOCYTES NFR BLD AUTO: 1 % (ref 0–0.5)
KETONES UR QL STRIP.AUTO: NEGATIVE MG/DL
LEUKOCYTE ESTERASE UR QL STRIP.AUTO: NEGATIVE
LYMPHOCYTES # BLD: 3.6 K/UL (ref 0.8–3.5)
LYMPHOCYTES NFR BLD: 40 % (ref 12–49)
MAGNESIUM SERPL-MCNC: 1.9 MG/DL (ref 1.6–2.4)
MCH RBC QN AUTO: 27.1 PG (ref 26–34)
MCHC RBC AUTO-ENTMCNC: 30.6 G/DL (ref 30–36.5)
MCV RBC AUTO: 88.5 FL (ref 80–99)
MONOCYTES # BLD: 0.9 K/UL (ref 0–1)
MONOCYTES NFR BLD: 10 % (ref 5–13)
NEUTS SEG # BLD: 4 K/UL (ref 1.8–8)
NEUTS SEG NFR BLD: 46 % (ref 32–75)
NITRITE UR QL STRIP.AUTO: NEGATIVE
NRBC # BLD: 0 K/UL (ref 0–0.01)
NRBC BLD-RTO: 0 PER 100 WBC
P-R INTERVAL, ECG05: 174 MS
PH UR STRIP: 6 [PH] (ref 5–8)
PLATELET # BLD AUTO: 310 K/UL (ref 150–400)
PMV BLD AUTO: 10.5 FL (ref 8.9–12.9)
POTASSIUM SERPL-SCNC: 3.6 MMOL/L (ref 3.5–5.1)
PROT SERPL-MCNC: 6.6 G/DL (ref 6.4–8.2)
PROT UR STRIP-MCNC: NEGATIVE MG/DL
Q-T INTERVAL, ECG07: 402 MS
QRS DURATION, ECG06: 94 MS
QTC CALCULATION (BEZET), ECG08: 414 MS
RBC # BLD AUTO: 4.35 M/UL (ref 3.8–5.2)
RBC #/AREA URNS HPF: ABNORMAL /HPF (ref 0–5)
SODIUM SERPL-SCNC: 139 MMOL/L (ref 136–145)
SP GR UR REFRACTOMETRY: 1.02 (ref 1–1.03)
UR CULT HOLD, URHOLD: NORMAL
UROBILINOGEN UR QL STRIP.AUTO: 0.2 EU/DL (ref 0.2–1)
VENTRICULAR RATE, ECG03: 64 BPM
WBC # BLD AUTO: 8.8 K/UL (ref 3.6–11)
WBC URNS QL MICRO: ABNORMAL /HPF (ref 0–4)

## 2020-07-14 PROCEDURE — 71045 X-RAY EXAM CHEST 1 VIEW: CPT

## 2020-07-14 PROCEDURE — 81025 URINE PREGNANCY TEST: CPT

## 2020-07-14 PROCEDURE — 81001 URINALYSIS AUTO W/SCOPE: CPT

## 2020-07-14 NOTE — ED NOTES
Patient ambulated to the bathroom without assistance. Patient tolerated ambulation well. Patient provided a urine sample. Patient provided a blanket. Patient resting comfortably on the stretcher.

## 2020-07-14 NOTE — ED TRIAGE NOTES
Pt arrives ambulatory to ed with complaints of palpitations x 2 days with increased fatigue today. Denies hx of irregular heartbeat.

## 2020-07-14 NOTE — ED NOTES
Patient resting on the stretcher in no apparent distress. Patient has no pain. Patient comfortable in bed. Patient does not have need to use the bathroom. Patient does not have IV fluids or medication running. The patient was updated on the plan of care and all questions were answered. The patient's needs were assessed and has no needs at this time. Patient's bed is in low position and locked, call bell and personal belongings are within reach and patient has been instructed on it's use. Area around bed is free of clutter and floor is clean.

## 2020-07-14 NOTE — ED NOTES
Performed orthostatic blood pressures. Orthostatics unremarkable. Patient asked to walk around room on monitor. Patient may have had PVCs with walking, but unclear due to movement. Dr. Tash Herman notified.

## 2020-07-14 NOTE — ED NOTES
Patient  given copy of dc instructions. Patient  verbalized understanding of instructions. P Patient alert and oriented and in no acute distress.   Patient discharged home ambulatory with family member

## 2020-07-14 NOTE — ED PROVIDER NOTES
Bonny Grider is a 40 yo F with palpitations. She states that for the past 2 days she has been having episodes where she feels her heart racing. They last 2-3 minutes and tonight have been occurring every hour or so. While the episodes occur she feels short of breath and afterwards she feels fatigued but she denies chest pain or nausea           Past Medical History:   Diagnosis Date    Arthritis     Back pain     Depression     Eczema     H/O: hysterectomy 2016    ovaries intact       Past Surgical History:   Procedure Laterality Date    HX GYN      HX ORTHOPAEDIC      left 5th toe repair    HX TUBAL LIGATION           Family History:   Problem Relation Age of Onset    Cancer Father     Heart Disease Father        Social History     Socioeconomic History    Marital status:      Spouse name: Not on file    Number of children: Not on file    Years of education: Not on file    Highest education level: Not on file   Occupational History    Not on file   Social Needs    Financial resource strain: Not on file    Food insecurity     Worry: Not on file     Inability: Not on file    Transportation needs     Medical: Not on file     Non-medical: Not on file   Tobacco Use    Smoking status: Current Every Day Smoker     Packs/day: 0.50     Years: 12.00     Pack years: 6.00    Smokeless tobacco: Never Used   Substance and Sexual Activity    Alcohol use:  Yes     Alcohol/week: 1.0 standard drinks     Types: 1 Glasses of wine per week     Comment: socially     Drug use: No    Sexual activity: Yes     Partners: Male   Lifestyle    Physical activity     Days per week: Not on file     Minutes per session: Not on file    Stress: Not on file   Relationships    Social connections     Talks on phone: Not on file     Gets together: Not on file     Attends Mu-ism service: Not on file     Active member of club or organization: Not on file     Attends meetings of clubs or organizations: Not on file Relationship status: Not on file    Intimate partner violence     Fear of current or ex partner: Not on file     Emotionally abused: Not on file     Physically abused: Not on file     Forced sexual activity: Not on file   Other Topics Concern    Not on file   Social History Narrative    ** Merged History Encounter **              ALLERGIES: Bactrim [sulfamethoprim ds]; Codeine; Pcn [penicillins]; Pcn [penicillins]; and Sulfur    Review of Systems   Constitutional: Negative for fever. HENT: Negative for sore throat. Eyes: Negative for visual disturbance. Respiratory: Positive for shortness of breath (during episodes of palpitaitons). Negative for cough. Cardiovascular: Positive for palpitations. Negative for chest pain. Gastrointestinal: Negative for abdominal pain. Genitourinary: Negative for dysuria. Musculoskeletal: Negative for back pain. Skin: Negative for rash. Neurological: Negative for headaches. Vitals:    07/13/20 2347   BP: 150/88   Pulse: 75   Resp: 16   SpO2: 99%   Weight: 135.4 kg (298 lb 8.1 oz)            Physical Exam  Vitals signs and nursing note reviewed. Constitutional:       General: She is not in acute distress. Appearance: She is well-developed. She is obese. HENT:      Head: Normocephalic and atraumatic. Eyes:      Conjunctiva/sclera: Conjunctivae normal.   Neck:      Musculoskeletal: Normal range of motion. Trachea: Phonation normal.   Cardiovascular:      Rate and Rhythm: Normal rate and regular rhythm. Pulses: Normal pulses. Pulmonary:      Effort: Pulmonary effort is normal. No respiratory distress. Breath sounds: No wheezing or rales. Abdominal:      General: Abdomen is flat. There is no distension. Tenderness: There is no abdominal tenderness. Musculoskeletal: Normal range of motion. General: No tenderness. Skin:     General: Skin is warm and dry. Neurological:      Mental Status: She is alert.  She is not disoriented. Motor: No abnormal muscle tone. ED EKG interpretation:  Rhythm: normal sinus rhythm; and regular . Rate (approx.): 64; Axis: normal; P wave: normal; QRS interval: normal ; ST/T wave: normal; Other findings: normal. This EKG was interpreted by Kirk Martin MD,ED Provider. MDM   Patient c/o palpitations. No arrhythmias or PVCs in ED. Labs normal.  Patient ambulated on monitor without event. Will discharge home with cardiology follow-up.      Procedures

## 2020-07-16 ENCOUNTER — VIRTUAL VISIT (OUTPATIENT)
Dept: FAMILY MEDICINE CLINIC | Age: 40
End: 2020-07-16

## 2020-07-16 DIAGNOSIS — R00.2 PALPITATIONS: Primary | ICD-10-CM

## 2020-07-16 NOTE — PROGRESS NOTES
S: Heidi Padilla is a 41 y.o. female who presents for follow up  From ED    Assessment/Plan: Virtual Visit (converted to telephone d/t pt couldn't see/hear provider)    1. Palpitations  -Went to ED 7/13/20 - labs (Ca = 8.3, Albumin = 3.3, Cr =1.05)and EKG = wnl  -has appt with Dr. Aaron Haro 7/27/20; will ask cards to pull CMP to check Ca, Cr and albumin since our lab is currently closed   -no current chest pain, palpitations come and go; reviewed s/s of MI and advised to return to ED if she experiences them prior to Cards visit    21 or more minutes were spent on the digital evaluation and management of this patient. RTC 3 months for CPE, labs       HPI:  Palpitations  Started Saturday 7/11/20 - sitting and crafting - thought it was anxiety and did some deep breathing  Lasted about 3-4 mins, HR was elevated; came back in 2-3 hours; Feels like heart is \"shaking, fluttering\"  If she goes down steps, will trigger HR into 90-120s  Not drinking a lot of caffeine  Went to ED 7/13/20 - labs and EKG  EKG = wnl  Tested for Covid - (has daughter who is immunocompromised)  Ca = 8.3  Cr >60  Albumin = 3.3  Has appt with Dr. Gina Fishman on 7/27/20    Social History:  Nutrition: drinks a lot of water, pescatarian until about a month and then started eating meat  Physical: discussed walking  Occupation: working from home    Social History     Tobacco Use   Smoking Status Current Every Day Smoker    Packs/day: 0.50    Years: 12.00    Pack years: 6.00   Smokeless Tobacco Never Used     Social History     Substance and Sexual Activity   Alcohol Use Yes    Alcohol/week: 1.0 standard drinks    Types: 1 Glasses of wine per week    Comment: socially      Social History     Substance and Sexual Activity   Drug Use No        Review of Systems:  - Constitutional Symptoms: no fevers, chills, weight loss  - Cardiovascular: no chest pain or palpitations  - Respiratory: no cough or shortness of breath  ROS is negative otherwise.     3 most recent PHQ Screens 10/26/2017   Little interest or pleasure in doing things Not at all   Feeling down, depressed, irritable, or hopeless Not at all   Total Score PHQ 2 0       I reviewed the following:  Past Medical History:   Diagnosis Date    Arthritis     Back pain     Depression     Eczema     H/O: hysterectomy 2016    ovaries intact       Current Outpatient Medications   Medication Sig Dispense Refill    cyclobenzaprine (FLEXERIL) 10 mg tablet Take 1 Tab by mouth nightly. 30 Tab 0    diclofenac potassium (CATAFLAM) 50 mg tablet Take 50 mg by mouth three (3) times daily.  buPROPion XL (WELLBUTRIN XL) 150 mg tablet TAKE 1 TAB BY MOUTH EVERY MORNING. INDICATIONS: ANXIETY WITH DEPRESSION, SMOKING CESSATION 90 Tab 0    metFORMIN ER (GLUCOPHAGE XR) 500 mg tablet TAKE 2 TABLETS BY MOUTH EVERY DAY WITH EVENING MEAL 90 Tab 3    escitalopram oxalate (LEXAPRO) 20 mg tablet TAKE 1 TAB BY MOUTH DAILY. 30 Tab 3       Allergies   Allergen Reactions    Bactrim [Sulfamethoprim Ds] Hives    Codeine Itching    Pcn [Penicillins] Hives    Pcn [Penicillins] Nausea and Vomiting    Sulfur Hives         Israel Santillan, who was evaluated through a patient-initiated, synchronous (real-time) audio only encounter, and/or her healthcare decision maker, is aware that it is a billable service, with coverage as determined by her insurance carrier. She provided verbal consent to proceed: Yes. She has not had a related appointment within my department in the past 7 days or scheduled within the next 24 hours. Total Time: minutes: 21-30 minutes    Krista Medina, JOAN     ___________________________________________________________________  Patient education was done. Advised on nutrition, physical activity, weight management, tobacco, alcohol and safety. Counseling included discussion of diagnosis, differentials, treatment options, prescribed treatment, warning signs and follow up.  Medication risks/benefits, interactions and alternatives discussed with patient.      Patient verbalized understanding and agreed to plan of care. Patient was given an after visit summary which included current diagnoses, medications and vital signs. Follow up as directed.

## 2020-07-16 NOTE — PATIENT INSTRUCTIONS
1) Palpitations  EKG was normal at hospital.    Calcium slightly low, albumin slightly low    Keep appt with Dr. Robin Hicks for further assessment. Please schedule an appointment with Cardiology. MD Sim Ellington MD  [facility]  [address] [place] [address]    Phone: [phone]    Labs  I asked Dr. Robin Hicks to order CMP to check the calcium, albumin and creatinine since our lab is currently closed. If you don't have blood drawn at that appt, just let me know so I can order it. Complete Metabolic Panel (CMP) assesses electrolytes, kidney and liver function.  (A Basic Metabolic Panel (BMP) does not include liver function.)  Electrolytes include sodium, potassium, calcium, and chloride. These are necessary for cell function and an imbalance can cause serious problems. BUN and creatinine are markers of kidney function. ALT and AST are markers of liver function. Palpitations: Care Instructions  Your Care Instructions     Heart palpitations are the uncomfortable sensation that your heart is beating fast or irregularly. You might feel pounding or fluttering in your chest. It might feel like your heart is skipping a beat. Although palpitations may be caused by a heart problem, they also occur because of stress, fatigue, or use of alcohol, caffeine, or nicotine. Many medicines, including diet pills, antihistamines, decongestants, and some herbal products, can cause heart palpitations. Nearly everyone has palpitations from time to time. Depending on your symptoms, your doctor may need to do more tests to try to find the cause of your palpitations. Follow-up care is a key part of your treatment and safety. Be sure to make and go to all appointments, and call your doctor if you are having problems. It's also a good idea to know your test results and keep a list of the medicines you take. How can you care for yourself at home?   · Avoid caffeine, nicotine, and excess alcohol. · Do not take illegal drugs, such as methamphetamines and cocaine. · Do not take weight loss or diet medicines unless you talk with your doctor first.  · Get plenty of sleep. · Do not overeat. · If you have palpitations again, take deep breaths and try to relax. This may slow a racing heart. · If you start to feel lightheaded, lie down to avoid injuries that might result if you pass out and fall down. · Keep a record of your palpitations and bring it to your next doctor's appointment. Write down:  ? The date and time. ? Your pulse. (If your heart is beating fast, it may be hard to count your pulse.)  ? What you were doing when the palpitations started. ? How long the palpitations lasted. ? Any other symptoms. · If an activity causes palpitations, slow down or stop. Talk to your doctor before you do that activity again. · Take your medicines exactly as prescribed. Call your doctor if you think you are having a problem with your medicine. When should you call for help? [id number] anytime you think you may need emergency care. For example, call if:  · You passed out (lost consciousness). · You have symptoms of a heart attack. These may include:  ? Chest pain or pressure, or a strange feeling in the chest.  ? Sweating. ? Shortness of breath. ? Pain, pressure, or a strange feeling in the back, neck, jaw, or upper belly or in one or both shoulders or arms. ? Lightheadedness or sudden weakness. ? A fast or irregular heartbeat. After you call 911, the  may tell you to chew 1 adult-strength or 2 to 4 low-dose aspirin. Wait for an ambulance. Do not try to drive yourself. · You have symptoms of a stroke. These may include:  ? Sudden numbness, tingling, weakness, or loss of movement in your face, arm, or leg, especially on only one side of your body. ? Sudden vision changes. ? Sudden trouble speaking. ? Sudden confusion or trouble understanding simple statements.   ? Sudden problems with walking or balance. ? A sudden, severe headache that is different from past headaches. Call your doctor now or seek immediate medical care if:  · You have heart palpitations and:  ? Are dizzy or lightheaded, or you feel like you may faint. ? Have new or increased shortness of breath. Watch closely for changes in your health, and be sure to contact your doctor if:  · You continue to have heart palpitations. Where can you learn more? Go to http://www.gray.com/  Enter R508 in the search box to learn more about \"Palpitations: Care Instructions. \"  Current as of: December 16, 2019               Content Version: 12.5  © 2733-8533 Healthwise, [employer]. Care instructions adapted under license by [employer] (which disclaims liability or warranty for this information). If you have questions about a medical condition or this instruction, always ask your healthcare professional. [place] [address] any warranty or liability for your use of this information.

## 2020-07-16 NOTE — PROGRESS NOTES
Jesse Pedersen is a 41 y.o. female    HIPAA verified by two patient identifiers. Chief Complaint   Patient presents with   [facility] ED Follow-up     Fast heart beat, palpating. 1. Have you been to the ER, urgent care clinic since your last visit? Hospitalized since your last visit? [facility], [facility], Palpitations     2. Have you seen or consulted any other health care providers outside of the [address] [place] [address] since your last visit? Include any pap smears or colon screening.  No    DOXY: [phone]

## 2020-07-16 NOTE — Clinical Note
Gucci Reed - this pt has appt with you July 27th for palpitations. Her labs at ED 7/13 had Ca 8.3, Albu 3.3 and Cr 1.05. Can you order a CMP for her to check values at your visit? Our lab is still closed until end of Aug!! Thanks!  Tala Bal

## 2020-07-27 ENCOUNTER — OFFICE VISIT (OUTPATIENT)
Dept: CARDIOLOGY CLINIC | Age: 40
End: 2020-07-27

## 2020-07-27 VITALS
WEIGHT: 293 LBS | SYSTOLIC BLOOD PRESSURE: 140 MMHG | HEIGHT: 71 IN | DIASTOLIC BLOOD PRESSURE: 80 MMHG | BODY MASS INDEX: 41.02 KG/M2 | RESPIRATION RATE: 20 BRPM | HEART RATE: 64 BPM | OXYGEN SATURATION: 99 %

## 2020-07-27 DIAGNOSIS — R00.0 TACHYCARDIA: Primary | ICD-10-CM

## 2020-07-27 DIAGNOSIS — R00.2 PALPITATIONS: ICD-10-CM

## 2020-07-27 DIAGNOSIS — R06.02 SOB (SHORTNESS OF BREATH): ICD-10-CM

## 2020-07-27 DIAGNOSIS — R73.03 PREDIABETES: ICD-10-CM

## 2020-07-27 DIAGNOSIS — E66.01 OBESITY, MORBID (HCC): ICD-10-CM

## 2020-07-27 NOTE — PROGRESS NOTES
Tennis Page     1980       [place]ri[place] [facility] [place] of Visit-7/27/2020   PCP is UNKNOWN   Sees [facility] [place]  [facility]  HPI:  Michelle Sanchez is a 41 y.o. female   Pt was in the ER at [facility] on 7/13/20 with palpitations and feeling her heart racing for several days, with SOB during the palpitations. UA and UPT were normal as were other lab work including d-dimer. Chest x-ray showed normal heart size. EKG showed SR WNL. Pt notes that her palpitations started the Friday before she went to the ER. The episode occurred when she was sitting and it lasted a couple minutes. The episodes also occurred on Saturday and Sunday when she was resting. By Monday she was getting anxious about the episodes so she went to the ER. She also reports feeling \"fluttering\". She is still having the episodes of tachycardia and fluttering, but they are less frequent. Last night she had an episode that lasted about 4 minutes. Pt is trying to walk more. She wears a fitbit and notes that her HR during her walk gets up to 170 bpm. She is on Metformin for pre-DM. She denies any medication changes. Pt's mother had AF. Her grandmother had an MI. Denies chest pain, edema, syncope or shortness of breath at rest    Assessment/Plan:     Palpitations and tachycardia  shortness of breath   Pre -diabetes as cardiovascular disease risk factor    Persistent. Suspect ectopics such as PAC's or PVC's but need to exclude SVT or AF. Will get a loop monitor and echo. Will check TSH. F/u with virtual visit in 6 weeks  Future Appointments   Date Time Provider Chucky Gee   8/10/2020  4:00 PM DIPESH CARDOZA AMB   9/15/2020 11:40 AM Brianna Melendez MD [facility]      Patient Instructions   Please get your blood work completed. You will be scheduled for an echocardiogram. A 7 day loop monitor has been ordered for you.  This will be mailed to the address given to us. Please read over the instructions given to you about loop monitors. If you have any insurance questions regarding coverage and out of pocket cost please contact the number on the instructions. We will see you back with a virtual visit in 6 weeks. Key CAD CHF Meds     Patient is on no cardiovascular meds. Chief complaint is tachycardia  Allergies to sulfur, morphine and penicillin all give her hives and morphine gave her vomiting   Surgical history includes hysterectomy 2016 at [address], [address]. No prior cath blood transfusion or ulcers      Social hx: smokes half pack per day for the last 13 years, drinks alcohol less than once per week, has 4 children, works in banking operations, lives with her  and daughter  Family hx: mother is 79 with DM and AF ablation, father passed at 46 of lung cancer, one brother with DM, prostate CA, and HTN, another brother with HTN  Review of Systems   Constitutional: Negative for diaphoresis, fever and malaise/fatigue. HENT: Positive for hearing loss. Negative for ear pain, nosebleeds and tinnitus. Eyes: Negative for blurred vision, double vision and pain. Respiratory: Negative for cough, hemoptysis, sputum production, shortness of breath, wheezing and stridor. Cardiovascular: Positive for palpitations and leg swelling. Negative for chest pain, orthopnea and claudication. Gastrointestinal: Negative for abdominal pain, blood in stool, constipation, diarrhea, heartburn, nausea and vomiting. Genitourinary: Negative for dysuria, frequency and urgency. Musculoskeletal: Positive for back pain. Negative for falls and joint pain. Skin: Positive for rash. Eczema   Neurological: Negative for dizziness, seizures, loss of consciousness, weakness and headaches. Endo/Heme/Allergies: Does not bruise/bleed easily. Psychiatric/Behavioral: Negative for depression, hallucinations and memory loss.  The patient is nervous/anxious. The patient does not have insomnia. see supplement sheet, initialed and to be scanned by staff  Impression:   1. Tachycardia    2. Palpitations    3. SOB (shortness of breath)    4. Obesity, morbid ([address]    5. Prediabetes       Cardiac History:   No specialty comments available. Past Medical History:   Diagnosis Date    Arthritis     Back pain     Depression     Eczema     H/O: hysterectomy 2016    ovaries intact      Social Hx= reports that she has been smoking. She has a 6.00 pack-year smoking history. She has never used smokeless tobacco. She reports current alcohol use of about 1.0 standard drinks of alcohol per week. She reports that she does not use drugs. Exam and Labs:  /80 (BP 1 Location: Left arm, BP Patient Position: Sitting)   Pulse 64   Resp 20   Ht 5' 11\" (1.803 m)   Wt 297 lb (134.7 kg)   LMP 11/24/2015 (Exact Date)   SpO2 99%   BMI 41.42 kg/m² Constitutional:  NAD, comfortable  Head: NC,AT. Eyes: No scleral icterus. Neck:  Neck supple. No JVD present. Throat: moist mucous membranes. Chest: Effort normal & normal respiratory excursion . Neurological: alert, conversant and oriented . Skin: Skin is not cold. No obvious systemic rash noted. Not diaphoretic. No erythema. Psychiatric:  Grossly normal mood and affect. Behavior appears normal. Extremities:  no clubbing or cyanosis. Abdomen: non distended    Lungs:breath sounds normal. No stridor. distress, wheezes or  Rales. Heart: normal rate, regular rhythm, normal S1, S2, no murmurs, rubs, clicks or gallops , PMI non displaced. Edema: Edema is none.   Lab Results   Component Value Date/Time    Cholesterol, total 193 10/26/2017 11:20 AM    HDL Cholesterol 33 (L) 10/26/2017 11:20 AM    LDL, calculated 130 (H) 10/26/2017 11:20 AM    Triglyceride 148 10/26/2017 11:20 AM     Lab Results   Component Value Date/Time    Sodium 139 07/13/2020 11:50 PM    Potassium 3.6 07/13/2020 11:50 PM    Chloride 103 07/13/2020 11:50 PM    CO2 30 07/13/2020 11:50 PM    Anion gap 6 07/13/2020 11:50 PM    Glucose 108 (H) 07/13/2020 11:50 PM    BUN 14 07/13/2020 11:50 PM    Creatinine 1.05 (H) 07/13/2020 11:50 PM    BUN/Creatinine ratio 13 07/13/2020 11:50 PM    GFR est AA >60 07/13/2020 11:50 PM    GFR est non-AA 57 (L) 07/13/2020 11:50 PM    Calcium 8.3 (L) 07/13/2020 11:50 PM      Wt Readings from Last 3 Encounters:   07/27/20 297 lb (134.7 kg)   07/13/20 298 lb 8.1 oz (135.4 kg)   03/05/20 291 lb (132 kg)      BP Readings from Last 3 Encounters:   07/27/20 140/80   07/14/20 116/60   03/05/20 137/77      Current Outpatient Medications   Medication Sig    cyclobenzaprine (FLEXERIL) 10 mg tablet Take 1 Tab by mouth nightly. (Patient taking differently: Take 10 mg by mouth as needed.)    diclofenac potassium (CATAFLAM) 50 mg tablet Take 50 mg by mouth three (3) times daily.  buPROPion XL (WELLBUTRIN XL) 150 mg tablet TAKE 1 TAB BY MOUTH EVERY MORNING. INDICATIONS: ANXIETY WITH DEPRESSION, SMOKING CESSATION    metFORMIN ER (GLUCOPHAGE XR) 500 mg tablet TAKE 2 TABLETS BY MOUTH EVERY DAY WITH EVENING MEAL    escitalopram oxalate (LEXAPRO) 20 mg tablet TAKE 1 TAB BY MOUTH DAILY. No current facility-administered medications for this visit. Impression see above.       Written by Luis Gallo, as dictated by Candy Wong MD.

## 2020-07-27 NOTE — PATIENT INSTRUCTIONS
Please get your blood work completed. You will be scheduled for an echocardiogram. A 7 day loop monitor has been ordered for you. This will be mailed to the address given to us. Please read over the instructions given to you about loop monitors. If you have any insurance questions regarding coverage and out of pocket cost please contact the number on the instructions. We will see you back with a virtual visit in 6 weeks.

## 2020-07-27 NOTE — PROGRESS NOTES
Visit Vitals  /80 (BP 1 Location: Left arm, BP Patient Position: Sitting)   Pulse 64   Resp 20   Ht 5' 11\" (1.803 m)   Wt 297 lb (134.7 kg)   LMP 11/24/2015 (Exact Date)   SpO2 99%   BMI 41.42 kg/m²

## 2020-07-30 ENCOUNTER — CLINICAL SUPPORT (OUTPATIENT)
Dept: CARDIOLOGY CLINIC | Age: 40
End: 2020-07-30

## 2020-07-30 DIAGNOSIS — R00.0 TACHYCARDIA: ICD-10-CM

## 2020-07-30 DIAGNOSIS — R00.2 PALPITATIONS: ICD-10-CM

## 2020-08-04 LAB
CHOLEST SERPL-MCNC: 185 MG/DL (ref 100–199)
HDLC SERPL-MCNC: 30 MG/DL
INTERPRETATION, 910389: NORMAL
LDLC SERPL CALC-MCNC: 135 MG/DL (ref 0–99)
TRIGL SERPL-MCNC: 101 MG/DL (ref 0–149)
TSH SERPL DL<=0.005 MIU/L-ACNC: 2.16 UIU/ML (ref 0.45–4.5)
VLDLC SERPL CALC-MCNC: 20 MG/DL (ref 5–40)

## 2020-08-04 NOTE — PROGRESS NOTES
Thyroid normal  Cholesterol suboptimal but not meds yet, work on diet and exercise and repeat in 6 months with PCP   If no PCP then us

## 2020-08-05 ENCOUNTER — TELEPHONE (OUTPATIENT)
Dept: CARDIOLOGY CLINIC | Age: 40
End: 2020-08-05

## 2020-08-05 NOTE — TELEPHONE ENCOUNTER
Verified patient with two patient identifiers. Spoke with patients and gave lab results. Per  repeat FLP in 6 months. Patient verbalized understanding.

## 2020-08-05 NOTE — TELEPHONE ENCOUNTER
----- Message from Char Christian MD sent at 8/4/2020  3:43 PM EDT -----  Thyroid normal  Cholesterol suboptimal but not meds yet, work on diet and exercise and repeat in 6 months with PCP   If no PCP then us

## 2020-08-10 ENCOUNTER — ANCILLARY PROCEDURE (OUTPATIENT)
Dept: CARDIOLOGY CLINIC | Age: 40
End: 2020-08-10
Payer: COMMERCIAL

## 2020-08-10 VITALS
HEIGHT: 71 IN | WEIGHT: 293 LBS | DIASTOLIC BLOOD PRESSURE: 80 MMHG | BODY MASS INDEX: 41.02 KG/M2 | SYSTOLIC BLOOD PRESSURE: 140 MMHG

## 2020-08-10 DIAGNOSIS — R00.0 TACHYCARDIA: ICD-10-CM

## 2020-08-10 DIAGNOSIS — R00.2 PALPITATIONS: ICD-10-CM

## 2020-08-10 PROCEDURE — 93306 TTE W/DOPPLER COMPLETE: CPT | Performed by: SPECIALIST

## 2020-08-14 LAB
ECHO AO ASC DIAM: 2.98 CM
ECHO AO ROOT DIAM: 2.96 CM
ECHO AV AREA PEAK VELOCITY: 3.93 CM2
ECHO AV AREA VTI: 3.82 CM2
ECHO AV AREA/BSA PEAK VELOCITY: 1.6 CM2/M2
ECHO AV AREA/BSA VTI: 1.5 CM2/M2
ECHO AV MEAN GRADIENT: 5.09 MMHG
ECHO AV PEAK GRADIENT: 10.03 MMHG
ECHO AV PEAK VELOCITY: 158.32 CM/S
ECHO AV VTI: 31.27 CM
ECHO EST RA PRESSURE: 3 MMHG
ECHO IVC PROX: 1.77 CM
ECHO LA AREA 4C: 20.82 CM2
ECHO LA MAJOR AXIS: 4.09 CM
ECHO LA MINOR AXIS: 1.64 CM
ECHO LA VOL 2C: 91.31 ML (ref 22–52)
ECHO LA VOL 4C: 54.94 ML (ref 22–52)
ECHO LA VOL BP: 80.76 ML (ref 22–52)
ECHO LA VOL/BSA BIPLANE: 32.37 ML/M2 (ref 16–28)
ECHO LA VOLUME INDEX A2C: 36.6 ML/M2 (ref 16–28)
ECHO LA VOLUME INDEX A4C: 22.02 ML/M2 (ref 16–28)
ECHO LV E' LATERAL VELOCITY: 10.48 CM/S
ECHO LV E' SEPTAL VELOCITY: 10.67 CM/S
ECHO LV EDV A2C: 152.69 ML
ECHO LV EDV A4C: 118.72 ML
ECHO LV EDV BP: 133.07 ML (ref 56–104)
ECHO LV EDV INDEX A4C: 47.6 ML/M2
ECHO LV EDV INDEX BP: 53.3 ML/M2
ECHO LV EDV NDEX A2C: 61.2 ML/M2
ECHO LV EJECTION FRACTION A2C: 64 PERCENT
ECHO LV EJECTION FRACTION A4C: 64 PERCENT
ECHO LV EJECTION FRACTION BIPLANE: 62.4 PERCENT (ref 55–100)
ECHO LV ESV A2C: 54.74 ML
ECHO LV ESV A4C: 43.17 ML
ECHO LV ESV BP: 50 ML (ref 19–49)
ECHO LV ESV INDEX A2C: 21.9 ML/M2
ECHO LV ESV INDEX A4C: 17.3 ML/M2
ECHO LV ESV INDEX BP: 20 ML/M2
ECHO LV INTERNAL DIMENSION DIASTOLIC: 5.81 CM (ref 3.9–5.3)
ECHO LV INTERNAL DIMENSION SYSTOLIC: 3.02 CM
ECHO LV IVSD: 1.17 CM (ref 0.6–0.9)
ECHO LV MASS 2D: 253.9 G (ref 67–162)
ECHO LV MASS INDEX 2D: 101.8 G/M2 (ref 43–95)
ECHO LV POSTERIOR WALL DIASTOLIC: 0.96 CM (ref 0.6–0.9)
ECHO LVOT DIAM: 2.33 CM
ECHO LVOT PEAK GRADIENT: 8.57 MMHG
ECHO LVOT PEAK VELOCITY: 146.39 CM/S
ECHO LVOT SV: 119.6 ML
ECHO LVOT VTI: 28.15 CM
ECHO MV A VELOCITY: 94.68 CM/S
ECHO MV AREA PHT: 3.99 CM2
ECHO MV E DECELERATION TIME (DT): 0.19 S
ECHO MV E VELOCITY: 121.41 CM/S
ECHO MV E/A RATIO: 1.28
ECHO MV E/E' LATERAL: 11.58
ECHO MV E/E' RATIO (AVERAGED): 11.48
ECHO MV E/E' SEPTAL: 11.38
ECHO MV PRESSURE HALF TIME (PHT): 0.06 S
ECHO RIGHT VENTRICULAR SYSTOLIC PRESSURE (RVSP): 29 MMHG
ECHO TV REGURGITANT MAX VELOCITY: 255.35 CM/S
ECHO TV REGURGITANT PEAK GRADIENT: 26.08 MMHG
LA VOL DISK BP: 72.59 ML (ref 22–52)
LVOT MG: 4.19 MMHG

## 2020-08-17 NOTE — PROGRESS NOTES
Echo looks good, within normal limits  Will discuss loop monitor at her follow up visit  Future Appointments  9/15/2020  11:40 AM   Brianna Melendez MD [facility]

## 2020-09-01 ENCOUNTER — TELEPHONE (OUTPATIENT)
Dept: CARDIOLOGY CLINIC | Age: 40
End: 2020-09-01

## 2020-09-07 NOTE — TELEPHONE ENCOUNTER
7 day loop shows no abnormalities  Future Appointments   Date Time Provider Chucky Gee   9/15/2020 11:40 AM Brianna Melendez MD [facility]

## 2020-09-08 ENCOUNTER — VIRTUAL VISIT (OUTPATIENT)
Dept: CARDIOLOGY CLINIC | Age: 40
End: 2020-09-08
Payer: COMMERCIAL

## 2020-09-08 DIAGNOSIS — R06.02 SOB (SHORTNESS OF BREATH): ICD-10-CM

## 2020-09-08 DIAGNOSIS — E66.01 OBESITY, MORBID (HCC): ICD-10-CM

## 2020-09-08 DIAGNOSIS — R00.0 TACHYCARDIA: Primary | ICD-10-CM

## 2020-09-08 PROCEDURE — 99213 OFFICE O/P EST LOW 20 MIN: CPT | Performed by: SPECIALIST

## 2020-09-08 NOTE — PROGRESS NOTES
Heydi Vences     1980       Vipal K. Martell Buerger MD, [facility]  Date of Visit-9/8/2020   PCP is UNKNOWN   [facility] [place]  [facility]  Virtual Visit  HPI:  Heydi Vences is a 41 y.o. female   who was seen by synchronous (real-time) audio-video technology on 9/8/2020. Fu of  Seen 7- for OV  Had ER visit 7/13/20 with palps, tachy, SOB with palps  Lipids suboptimal  Lab Results   Component Value Date/Time    Cholesterol, total 185 08/03/2020 08:40 AM    HDL Cholesterol 30 (L) 08/03/2020 08:40 AM    LDL, calculated 135 (H) 08/03/2020 08:40 AM    VLDL, calculated 20 08/03/2020 08:40 AM    Triglyceride 101 08/03/2020 08:40 AM      Lab Results   Component Value Date/Time    TSH 2.160 08/03/2020 08:40 AM    Echo and Loop done  Seven day loop was benign    Today  Denies chest pain, edema, syncope or shortness of breath at rest   She still has some feeling of rapid heart rate which she associates with the anxiety. She definitely felt these feelings while she wore the loop monitor so the fact that the loop monitor was normal is reassuring to us. Previous hx or visit:    08/10/20   ECHO ADULT COMPLETE 08/14/2020 8/14/2020    Narrative · LV: Normal cavity size, systolic function (ejection fraction normal) and   diastolic function. Upper normal wall thickness. Estimated left   ventricular ejection fraction is 60 - 65%. Biplane method used to measure   ejection fraction. Wall motion: normal.  · LA: Mildly dilated left atrium. Signed by: Lenin Gunter MD      Assessment/Plan:     1. Tachycardia  *While symptomatic the loop monitor did not show any significant arrhythmia. She certainly could still have had a PSVT especially noting that heart rate of 170 but at this point without demonstrated tachycardia I would not place her on a beta-blocker. She is comfortable and not limited by this and as she says she armendariz through it.     2. SOB (shortness of breath)  *Echocardiogram shows normal LV function and valves and she has no chest pain to suggest CAD. I have told her if she develops any chest pain to call me right away. 3. Obesity, morbid ([address]  **She wants to work on diet and exercise and feels reassured by the discussion today*     So overall testing is reviewed with her including thyroid lipids echo and loop. It is overall fairly benign she is to work on diet and exercise improve that LDL but I would miss her put on medication just yet. Perhaps she should get a calcium score in about 5 years or so. She will follow-up with her PCP and work on anxiety stress reduction with her current medication at the current time. She is clearly aware if she develops chest pain to call me right away. This note was created using voice recognition software. Despite editing, there may be syntax errors. Key CAD CHF Meds     Patient is on no cardiovascular meds. ROS-except as noted above. . A complete cardiac and respiratory are reviewed and negative except as above ; Resp-denies wheezing  or productive cough,. Const- No unusual weight loss or fever; Neuro-no recent seizure or CVA ; GI- No BRBPR, abdom pain, bloating ; - no  hematuria   Past Medical History:   Diagnosis Date    Arthritis     Back pain     Depression     Eczema     H/O: hysterectomy 2016    ovaries intact      Social Hx= reports that she has been smoking. She has a 6.00 pack-year smoking history. She has never used smokeless tobacco. She reports current alcohol use of about 1.0 standard drinks of alcohol per week. She reports that she does not use drugs. Due to this being a TeleHealth evaluation, many elements of the physical examination are unable to be assessed. Vitals if sent, or see HPI    Visit Vitals  LMP 11/24/2015 (Exact Date)      General: Well developed, in no acute distress, cooperative and alert  HEENT: Pupils equal/round.  No marked JVD visible on video.  Respiratory: No audible wheezing, no signs of respiratory distress, lips non cyanotic  Extremities:  No edema  Neuro: A&Ox3, speech clear, no facial droop, answering questions appropriately  Skin: Skin color is normal. No rashes or lesions. Non diaphoretic on visible skin during exam  Psych: mood and affect are appropriate and pleasant    Lab Results   Component Value Date/Time    Cholesterol, total 185 08/03/2020 08:40 AM    HDL Cholesterol 30 (L) 08/03/2020 08:40 AM    LDL, calculated 135 (H) 08/03/2020 08:40 AM    Triglyceride 101 08/03/2020 08:40 AM     Lab Results   Component Value Date/Time    Sodium 139 07/13/2020 11:50 PM    Potassium 3.6 07/13/2020 11:50 PM    Chloride 103 07/13/2020 11:50 PM    CO2 30 07/13/2020 11:50 PM    Anion gap 6 07/13/2020 11:50 PM    Glucose 108 (H) 07/13/2020 11:50 PM    BUN 14 07/13/2020 11:50 PM    Creatinine 1.05 (H) 07/13/2020 11:50 PM    BUN/Creatinine ratio 13 07/13/2020 11:50 PM    GFR est AA >60 07/13/2020 11:50 PM    GFR est non-AA 57 (L) 07/13/2020 11:50 PM    Calcium 8.3 (L) 07/13/2020 11:50 PM      Wt Readings from Last 3 Encounters:   08/10/20 297 lb (134.7 kg)   07/27/20 297 lb (134.7 kg)   07/13/20 298 lb 8.1 oz (135.4 kg)      BP Readings from Last 3 Encounters:   08/10/20 140/80   07/27/20 140/80   07/14/20 116/60        Current Outpatient Medications   Medication Sig    metFORMIN ER (GLUCOPHAGE XR) 500 mg tablet Take 2 Tabs by mouth daily (with dinner).  cyclobenzaprine (FLEXERIL) 10 mg tablet Take 1 Tab by mouth nightly. (Patient taking differently: Take 10 mg by mouth as needed.)    diclofenac potassium (CATAFLAM) 50 mg tablet Take 50 mg by mouth three (3) times daily.  buPROPion XL (WELLBUTRIN XL) 150 mg tablet TAKE 1 TAB BY MOUTH EVERY MORNING. INDICATIONS: ANXIETY WITH DEPRESSION, SMOKING CESSATION    escitalopram oxalate (LEXAPRO) 20 mg tablet TAKE 1 TAB BY MOUTH DAILY. No current facility-administered medications for this visit. Impression see above. VIRTUAL VISIT DOCUMENTATION   Pursuant to the emergency declaration under the [facility] waiver authority and the [employer] Act, this Virtual  Visit was conducted, with patient's consent, to reduce the patient's risk of exposure to COVID-19 and provide continuity of care for an established patient. Services were provided through a video synchronous discussion virtually to substitute for in-person clinic visit. We discussed the expected course, resolution and complications of the diagnosis(es) in detail. Medication risks, benefits, costs, interactions, and alternatives were discussed as indicated. I advised her to contact the office if her condition worsens, changes or fails to improve as anticipated. She expressed understanding with the diagnosis(es) and plan  I have reviewed the nurses notes, vitals, problem list, allergy list, medical history, family, social history and medications. FOLLOW-UP   Patient was made aware and verbalized understanding that an appointment will be scheduled for them for a virtual visit and/or office visit within the above time frame. Patient understanding his/her responsibility to call and change time/date if he/she so chooses. MD Trevin Mariscal [facility] 92.  [facility], [facility], [address], [address] [place] [address] Drive    [address] [facility]  [phone] / [phone] Fax  [phone] / [phone] Fax  This visit was conducted using [employer] services or similar service.

## 2020-09-24 ENCOUNTER — VIRTUAL VISIT (OUTPATIENT)
Dept: FAMILY MEDICINE CLINIC | Age: 40
End: 2020-09-24
Payer: COMMERCIAL

## 2020-09-24 DIAGNOSIS — Z86.010 HISTORY OF COLON POLYPS: ICD-10-CM

## 2020-09-24 DIAGNOSIS — R73.9 HYPERGLYCEMIA: Primary | ICD-10-CM

## 2020-09-24 DIAGNOSIS — Z82.49 FAMILY HISTORY OF HYPERTENSION: ICD-10-CM

## 2020-09-24 DIAGNOSIS — Z83.3 FH: DIABETES MELLITUS: ICD-10-CM

## 2020-09-24 DIAGNOSIS — E78.2 MIXED HYPERLIPIDEMIA: ICD-10-CM

## 2020-09-24 DIAGNOSIS — K58.0 IRRITABLE BOWEL SYNDROME WITH DIARRHEA: ICD-10-CM

## 2020-09-24 DIAGNOSIS — Z82.3 FAMILY HISTORY OF STROKE: ICD-10-CM

## 2020-09-24 DIAGNOSIS — Z80.1 FAMILY HX OF LUNG CANCER: ICD-10-CM

## 2020-09-24 DIAGNOSIS — G89.29 CHRONIC PAIN OF LEFT KNEE: ICD-10-CM

## 2020-09-24 DIAGNOSIS — E66.01 OBESITY, CLASS III, BMI 40-49.9 (MORBID OBESITY) (HCC): ICD-10-CM

## 2020-09-24 DIAGNOSIS — Z83.42 FAMILY HISTORY OF HIGH CHOLESTEROL: ICD-10-CM

## 2020-09-24 DIAGNOSIS — F32.89 OTHER DEPRESSION: ICD-10-CM

## 2020-09-24 DIAGNOSIS — M25.562 CHRONIC PAIN OF LEFT KNEE: ICD-10-CM

## 2020-09-24 DIAGNOSIS — R63.5 WEIGHT GAIN: ICD-10-CM

## 2020-09-24 DIAGNOSIS — Z82.49 FAMILY HISTORY OF HEART ATTACK: ICD-10-CM

## 2020-09-24 DIAGNOSIS — D50.8 OTHER IRON DEFICIENCY ANEMIA: ICD-10-CM

## 2020-09-24 DIAGNOSIS — Z83.49 FAMILY HISTORY OF OBESITY: ICD-10-CM

## 2020-09-24 DIAGNOSIS — Z82.0 FAMILY HISTORY OF SLEEP APNEA: ICD-10-CM

## 2020-09-24 PROBLEM — D50.9 IRON DEFICIENCY ANEMIA: Status: ACTIVE | Noted: 2017-01-01

## 2020-09-24 PROBLEM — M54.2 NECK PAIN: Status: ACTIVE | Noted: 2020-03-01

## 2020-09-24 PROBLEM — E78.5 HYPERLIPIDEMIA: Status: ACTIVE | Noted: 2018-01-01

## 2020-09-24 PROBLEM — I49.8 PERIODIC HEART FLUTTER: Status: ACTIVE | Noted: 2020-07-01

## 2020-09-24 PROCEDURE — 99215 OFFICE O/P EST HI 40 MIN: CPT | Performed by: FAMILY MEDICINE

## 2020-09-24 RX ORDER — METFORMIN HYDROCHLORIDE 500 MG/1
1000 TABLET ORAL 2 TIMES DAILY WITH MEALS
Qty: 120 TAB | Refills: 5 | Status: SHIPPED | OUTPATIENT
Start: 2020-09-24 | End: 2022-10-07 | Stop reason: ALTCHOICE

## 2020-09-24 NOTE — Clinical Note
Araceli Cerda! This dear new patient is now interested in [facility]. Please follow up with her.     Thanks,  Dr. Gerardo Lino

## 2020-09-24 NOTE — PROGRESS NOTES
VIRTUAL VISIT      Pursuant to the emergency declaration under the [employer] [facility] waiver authority and the [employer] and [employer] Act, this Virtual Visit was conducted, with patient's consent, to reduce the patient's risk of exposure to COVID-19 and provide continuity of care for an established patient. Services were provided through a video synchronous discussion virtually to substitute for in-person appointment. Consent:  She and/or her healthcare decision maker is aware that this patient-initiated Telehealth encounter is a billable service, with coverage as determined by her insurance carrier. She is aware that she may receive a bill and has provided verbal consent to proceed: Yes    HISTORY OF PRESENT ILLNESS  Vera Bolton is a 41 y.o. female presents with Weight Management; Medication Evaluation (Patient states that she wanted to discuss medications for weight loss); Establish Care (for weight managment); and Stress      Agree with nurse note. Pt's PCP is Bard Peewee NP. She is establishing care with me today for weight management. Pt with hyperglycemia, mixed hyperlipidemia, iron deficiency anemia, depression, chronic L knee pain, and family hx of high cholesterol, sleep apnea, and lung ca with a BP of 124/68. She takes metformin 500 mg 2 pills daily for hyperglycemia; tolerating well. Pt with obesity and family hx of obesity and DM notes a weight gain of 45 lbs since 1/2020. Current weight is 294 lbs. She eats toast and waffles or cereal for breakfast. She eats [employer] nuggets and fries or Chipotle bowls for lunch. She drinks 70 oz of water daily. She drinks coffee with Splenda and sugar free creamer daily. She drinks Coke Zero and bottle sweet tea occasionally. She gets 7-8 hours of sleep nightly. Barriers: She has been more sedentary due to Covid. She is transitioning between jobs.  Her last day at her previous job, where she worked 9-10 hour days, was 9/18/2020. She stress eats because she is worried about her 4 children. She notes a son in the [employer], a son playing football in college, and a daughter living in [place]. Pt with family hx of hypertension, stroke, and heart attack saw Dr. Hernandez Gabriel on 7/27/2020 for palpitations and racing heart beat x 4 days, with SOB during palpitations. EKG NSR. Previous episode lasted about 4 minutes. She wears a fitbit and notes that her HR during her walk gets up to 170 bpm. Ordered 7 day loop monitor. Pt f/u on 9/8/2020. 7 day loop was benign. Lipids suboptimal. , HDL 30, trigs 101. Health Maintenance    Pt with IBS and hx of colon polyps. Pt's most recent endoscopy was in 2010 in [place]. Pt's most recent colonoscopy was in 2015. Last noted IBS flare up in 4/2020 with diarrhea and vomiting. Written by mary Buitrago, as dictated by Dr. Sterling Driscoll DO.    ROS    Review of Systems negative except as noted above in HPI. ALLERGIES:    Allergies   Allergen Reactions    Bactrim [Sulfamethoprim Ds] Hives    Codeine Itching    Pcn [Penicillins] Hives    Pcn [Penicillins] Nausea and Vomiting    Sulfur Hives       CURRENT MEDICATIONS:    Outpatient Medications Marked as Taking for the 9/24/20 encounter (Virtual Visit) with Isabel Vasquez DO   Medication Sig Dispense Refill    metFORMIN (GLUCOPHAGE) 500 mg tablet Take 2 Tabs by mouth two (2) times daily (with meals). Indications: prevention of type 2 diabetes mellitus 120 Tab 5    diclofenac potassium (CATAFLAM) 50 mg tablet Take 50 mg by mouth three (3) times daily.  buPROPion XL (WELLBUTRIN XL) 150 mg tablet TAKE 1 TAB BY MOUTH EVERY MORNING. INDICATIONS: ANXIETY WITH DEPRESSION, SMOKING CESSATION 90 Tab 0    escitalopram oxalate (LEXAPRO) 20 mg tablet TAKE 1 TAB BY MOUTH DAILY.  30 Tab 3       PAST MEDICAL HISTORY:    Past Medical History:   Diagnosis Date    Back pain     Bilateral ovarian cysts 2016    Dr. Serge Pérez    Chronic left shoulder pain     Chronic pain of left knee     Depression     GARCIA (dyspnea on exertion) 07/2020    Dr. Sheri Escobedo.  EF 60-65%    Dysmenorrhea 2016    Dr. Alejandro Polanco H/O: hysterectomy 2016    ovaries intact    Hyperglycemia 2017    Hyperlipidemia 2017    IBS (irritable bowel syndrome)     w diarrhea. [facility]    Iron deficiency anemia 2017    Left knee pain 12/2019    OA. Dr. Anderson Levy    Neck pain 03/2020    DDD. Dr. Ashton Purchase    Periodic heart flutter 07/2020    symptomatic. Dr. Sheri Escobedo.       PAST SURGICAL HISTORY:    Past Surgical History:   Procedure Laterality Date    HX COLONOSCOPY  2010, 2015    w colon polypectomy. due q 5 yrs.  HX ENDOSCOPY  2010    in [address] HERNIA REPAIR  2016    w MICHAEL.  HX ORTHOPAEDIC Left     left 5th toe repair    HX TOTAL ABDOMINAL HYSTERECTOMY      ovaries intact.  HX TUBAL LIGATION         FAMILY HISTORY:    Family History   Problem Relation Age of Onset    Stroke Father         multiple    Hypertension Father     Lung Cancer Father         and THROAT CANCER    Thyroid Disease Mother 61        hypothyroid    Diabetes Mother     Hypertension Mother     High Cholesterol Mother     Heart Surgery Mother         for tachycardia.   txd w Ablation    Obesity Mother         Txd w GBP    Prostate Cancer Brother     Diabetes Brother     Hypertension Brother     Sleep Apnea Brother     Obesity Brother     Heart Attack Maternal Grandmother 43    Diabetes Maternal Grandmother     High Cholesterol Maternal Grandmother     Dementia Maternal Grandmother     Other Maternal Grandfather 27        mva    Alzheimer Paternal Grandmother     Hypertension Paternal Grandfather     Diabetes Brother     Hypertension Brother     Obesity Brother        SOCIAL HISTORY:    Social History     Socioeconomic History    Marital status:  Spouse name: Not on file    Number of children: Not on file    Years of education: Not on file    Highest education level: Not on file   Tobacco Use    Smoking status: Current Every Day Smoker     Packs/day: 0.25     Years: 12.00     Pack years: 3.00    Smokeless tobacco: Never Used   Substance and Sexual Activity    Alcohol use: Yes     Alcohol/week: 1.0 standard drinks     Types: 1 Glasses of wine per week     Comment: socially     Drug use: No    Sexual activity: Yes     Partners: Male   Social History Narrative    ** Merged History Encounter **            IMMUNIZATIONS:    Immunization History   Administered Date(s) Administered    Influenza Vaccine (Quad) PF 10/26/2017    Tdap 08/30/2016         PHYSICAL EXAMINATION (PER VISUAL INSPECTION AND INSTRUCTIONS GIVEN TO PATIENT TO PERFORM)    Due to this being a TeleHealth encounter (During [place] [address] public health emergency), evaluation of the following organ systems was limited to:     Vital Signs    Visit Vitals  LMP 11/24/2015 (Exact Date)       Weight Metrics 8/10/2020 7/27/2020 7/13/2020 3/5/2020 2/28/2020 2/20/2018 12/12/2017   Weight 297 lb 297 lb 298 lb 8.1 oz 291 lb 294 lb 1.5 oz 281 lb 8 oz 287 lb 3.2 oz   BMI 41.42 kg/m2 41.42 kg/m2 41.63 kg/m2 40.59 kg/m2 41.02 kg/m2 39.26 kg/m2 40.06 kg/m2       General appearance - Well nourished. Well appearing. Well developed. No acute distress. Obese. Head - Normocephalic. Atraumatic. Eyes - Extraocular eye movements intact. Sclera anicteric. Mildly injected sclera. Ears - Hearing is grossly normal bilaterally. Nose - normal and patent. No discharge. Chest - No visualized signs of difficulty breathing or respiratory distress. Heart - normal rate. Neurological - awake, alert and oriented to person, place, and time and event. Cranial nerves II through XII intact. Clear speech. Musculoskeletal - Intact x 4 extremities. No pain with movement.     Psychological -   normal behavior, dress and thought processes. Good insight. Good eye contact. Normal affect. Appropriate mood. Normal speech. DATA REVIEWED    Lab Results   Component Value Date/Time    WBC 8.8 07/13/2020 11:50 PM    HGB 11.8 07/13/2020 11:50 PM    HCT 38.5 07/13/2020 11:50 PM    PLATELET 451 26/95/6145 11:50 PM    MCV 88.5 07/13/2020 11:50 PM     Lab Results   Component Value Date/Time    Sodium 139 07/13/2020 11:50 PM    Potassium 3.6 07/13/2020 11:50 PM    Chloride 103 07/13/2020 11:50 PM    CO2 30 07/13/2020 11:50 PM    Anion gap 6 07/13/2020 11:50 PM    Glucose 108 (H) 07/13/2020 11:50 PM    BUN 14 07/13/2020 11:50 PM    Creatinine 1.05 (H) 07/13/2020 11:50 PM    BUN/Creatinine ratio 13 07/13/2020 11:50 PM    GFR est AA >60 07/13/2020 11:50 PM    GFR est non-AA 57 (L) 07/13/2020 11:50 PM    Calcium 8.3 (L) 07/13/2020 11:50 PM    Bilirubin, total 0.1 (L) 07/13/2020 11:50 PM    Alk. phosphatase 83 07/13/2020 11:50 PM    Protein, total 6.6 07/13/2020 11:50 PM    Albumin 3.3 (L) 07/13/2020 11:50 PM    Globulin 3.3 07/13/2020 11:50 PM    A-G Ratio 1.0 (L) 07/13/2020 11:50 PM    ALT (SGPT) 20 07/13/2020 11:50 PM     Lab Results   Component Value Date/Time    Cholesterol, total 185 08/03/2020 08:40 AM    HDL Cholesterol 30 (L) 08/03/2020 08:40 AM    LDL, calculated 135 (H) 08/03/2020 08:40 AM    VLDL, calculated 20 08/03/2020 08:40 AM    Triglyceride 101 08/03/2020 08:40 AM     Lab Results   Component Value Date/Time    Hemoglobin A1c 6.1 (H) 10/26/2017 11:20 AM     Lab Results   Component Value Date/Time    TSH 2.160 08/03/2020 08:40 AM       Lab Results   Component Value Date/Time    Microalb/Creat ratio (ug/mg creat.) Comment (A) 10/26/2017 11:20 AM         ASSESSMENT and PLAN      ICD-10-CM ICD-9-CM    1. Hyperglycemia  R73.9 790.29 metFORMIN (GLUCOPHAGE) 500 mg tablet   2. Other depression  F32.89 311     worse with weight gain during Covid, family stress vs other, slightly improving on Lexapro and Wellbutrin   3.  Mixed hyperlipidemia  E78.2 272.2    4. Chronic pain of left knee  M25.562 719.46     G89.29 338.29    5. Weight gain  R63.5 783.1     #45 since 1/2020.   6. Other iron deficiency anemia  D50.8 280.8    7. Obesity, Class III, BMI 40-49.9 (morbid obesity) ([facility])  E66.01 278.01    8. Family hx of lung cancer  Z80.1 V16.1    9. Family history of stroke  Z82.3 V17.1    10. Family history of high cholesterol  Z83.42 V18.19    11. Family history of heart attack  Z82.49 V17.3    12. FH: diabetes mellitus  Z83.3 V18.0    13. Family history of obesity  Z83.49 V18.19    14. Family history of sleep apnea  Z82.0 V19.8    15. History of colon polyps  Z86.010 V12.72        Discussed the patient's BMI with her. The BMI follow up plan is as follows: I have counseled this patient on diet and exercise regimens. Decrease carbohydrates (white foods, sweet foods, sweet drinks and alcohol), increase green leafy vegetables and protein (lean meats and beans) with each meal.  Avoid fried foods. Eat 3-5 small meals daily. Do not skip meals. Increase water intake. Increase physical activity to 30 minutes daily for health benefit or 60 minutes daily to prevent weight regain, as tolerated. Get 7-8 hours uninterrupted sleep nightly. Recommend almonds, walnuts, pecan, or sunflower seeds for protein. Recommend (A)pples, (B)erries, and (C)herries. Recommend self-brewed green tea instead of coffee. Recommend Yolanda instead of soda. Limit white foods to a fistful portion. Chart reviewed and updated. Continue current medications and care. Takes metformin 500 mg qhs x 1 week. If tolerated, increase to 500 mg 1 pill BID x 1 week. If tolerated, increase to 500 mg 1 pill qAM and 2 pills qPM x 1 week. If tolerated, increase to 500 mg 2 pill BID. Prescriptions written and sent to pharmacy; medication side effects discussed. Metformin 500 mg  Recent office visit notes from Cardio reviewed. Get recent office visit notes from colonoscopy.  Advised pt to sign release. Referrals given; patient urged to keep appointments with specialists. Contact information for the [facility] program given to pt today. Counseled patient on health concerns:  Weight, SOB, tachycardia  Offered empathy, support, legitimation, prayers, partnership to patient. Praised patient for progress. Follow-up and Dispositions    · Return in about 1 month (around 10/24/2020) for weight, results. Encouraged the pt to [facility] to be able to view results and send me any questions or concerns prior to the next visit where we will go over results in detail. Patient was offered a choice/choices in the treatment plan today. Patient expresses understanding of the plan and agrees with recommendations. 59 mins spent face to face with patient and more than 50% of this time spent in counseling and coordinating care. Written by mary Jones, as dictated by Dr. Yuri Flores DO. Documentation True and Accepted by Blayne Weber.  Ewelina Sierra.

## 2020-09-24 NOTE — PROGRESS NOTES
Bonny Grider is a 41 y.o. female     Chief Complaint   Patient presents with    Weight Management    Medication Evaluation     Patient states that she wanted to discuss medications for weight loss     1. Have you been to the ER, urgent care clinic since your last visit? Hospitalized since your last visit? No    2. Have you seen or consulted any other health care providers outside of the [address] [place] [address] since your last visit? Include any pap smears or colon screening.  No     Patient-Reported Vitals 9/24/2020   Patient-Reported Weight 294   Patient-Reported Height 511   Patient-Reported Pulse 60   Patient-Reported Systolic  653   Patient-Reported Diastolic 68       Pain Scale: 0/10  Pain Location: Patient did not verbalize pain at this time, rated 0/10

## 2020-10-14 ENCOUNTER — VIRTUAL VISIT (OUTPATIENT)
Dept: FAMILY MEDICINE CLINIC | Age: 40
End: 2020-10-14
Payer: COMMERCIAL

## 2020-10-14 DIAGNOSIS — Z82.3 FAMILY HISTORY OF STROKE: ICD-10-CM

## 2020-10-14 DIAGNOSIS — R19.7 DIARRHEA, UNSPECIFIED TYPE: ICD-10-CM

## 2020-10-14 DIAGNOSIS — D50.8 OTHER IRON DEFICIENCY ANEMIA: ICD-10-CM

## 2020-10-14 DIAGNOSIS — Z82.49 FAMILY HISTORY OF HEART ATTACK: ICD-10-CM

## 2020-10-14 DIAGNOSIS — Z83.42 FAMILY HISTORY OF HIGH CHOLESTEROL: ICD-10-CM

## 2020-10-14 DIAGNOSIS — R73.9 HYPERGLYCEMIA: Primary | ICD-10-CM

## 2020-10-14 DIAGNOSIS — R00.2 PALPITATIONS: ICD-10-CM

## 2020-10-14 DIAGNOSIS — K58.0 IRRITABLE BOWEL SYNDROME WITH DIARRHEA: ICD-10-CM

## 2020-10-14 DIAGNOSIS — Z80.1 FAMILY HX OF LUNG CANCER: ICD-10-CM

## 2020-10-14 DIAGNOSIS — F41.9 ANXIETY: ICD-10-CM

## 2020-10-14 DIAGNOSIS — Z83.3 FH: DIABETES MELLITUS: ICD-10-CM

## 2020-10-14 DIAGNOSIS — Z82.49 FAMILY HISTORY OF HYPERTENSION: ICD-10-CM

## 2020-10-14 DIAGNOSIS — E66.01 OBESITY, CLASS III, BMI 40-49.9 (MORBID OBESITY) (HCC): ICD-10-CM

## 2020-10-14 DIAGNOSIS — Z82.0 FAMILY HISTORY OF SLEEP APNEA: ICD-10-CM

## 2020-10-14 DIAGNOSIS — F32.89 OTHER DEPRESSION: ICD-10-CM

## 2020-10-14 DIAGNOSIS — E55.9 VITAMIN D DEFICIENCY: ICD-10-CM

## 2020-10-14 DIAGNOSIS — Z83.49 FAMILY HISTORY OF OBESITY: ICD-10-CM

## 2020-10-14 DIAGNOSIS — E78.2 MIXED HYPERLIPIDEMIA: ICD-10-CM

## 2020-10-14 PROCEDURE — 99215 OFFICE O/P EST HI 40 MIN: CPT | Performed by: FAMILY MEDICINE

## 2020-10-14 RX ORDER — SERTRALINE HYDROCHLORIDE 50 MG/1
50 TABLET, FILM COATED ORAL DAILY
Qty: 90 TAB | Refills: 0 | Status: SHIPPED | OUTPATIENT
Start: 2020-10-14 | End: 2021-01-17

## 2020-10-14 NOTE — PROGRESS NOTES
[employer] Medically Supervised   [facility] Loss Program   [facility]    VIRTUAL INITIAL PHYSICIAN VISIT      Pursuant to the emergency declaration under the [employer] [facility], 1135 waiver authority and the [employer] Act, this Virtual Visit was conducted, with patient's consent, to reduce the patient's risk of exposure to COVID-19 and provide continuity of care for an established patient. Services were provided through a video synchronous discussion virtually to substitute for in-person appointment. Consent:  She and/or her healthcare decision maker is aware that this patient-initiated Telehealth encounter is a billable service, with coverage as determined by her insurance carrier. She is aware that she may receive a bill and has provided verbal consent to proceed: Yes    HISTORY OF PRESENT ILLNESS  Agree with nurse and registered dietician notes. I saw pt on 9/24/2020 to establish care for weight management. Carson Rosas is a 41 y.o. female with Obesity Class III, There is no height or weight on file to calculate BMI. and associated health concerns presents for evaluation and treatment of weight management. How did you hear about the MSWL program? I gave pt the contact information for the MSWL program during our visit on 9/24/2020. Have you ever participated in any other weight loss programs, self directed or commercial? If so, maximum weight loss? Yes, her problems with weight began in her teenage years. She has participated in 3 successful self directed diets. Do you have any current major lifestyle changes? She is in the process of moving to a new home. She started a new job in 9/2020. She is working remotely due to [employer]. Weight History    Current weight 293 lbs. Lowest weight 195 lbs when she was 25years old. Maximum weight 299 lbs. Goal weight 195 lbs.  She began gaining excess weight in 2017 after having a hysterectomy, the passing of her grandfather and being laid off. Have you ever taken weight loss medications or herbal remedies? No.               Have you or anyone in your family ever had weight loss surgery? No.    Eating Habits  How many times a week do you eat fast food or at restaurants? 3 times/week. Are you skipping meals and if so, why? No. \"I try my hardest to eat something. \"  Which meals do you eat? Breakfast: oatmeal; Lunch: salad with grilled chicken, nachos with sofritas  Do you have upcoming travel in the next 6 weeks? No.  Do you have a history of binge eating disorder, anorexia, and bulimia? No.              If yes, how long ago and how was it treated? N/A/     Drinking Habits  How much caffeine do you drink daily? Coffee with sugar-free sweetener. How much alcohol do you drink daily and weekly? None. Do you consume any sugar-sweetened beverages (sodas, teas, juices, etc.)? No.  How much water do you consume daily? 120 oz     Sleep Habits  Do you sleep between 7-9 hours a night? Yes, she gets 7 hours of sleep nightly  Have you been diagnosed with Sleep Apnea in the past? No.  Do you regularly use a CPAP machine? N/A. Exercise  How many days a week do you currently exercise? 0  Have you ever been told by a physician not to exercise? No.  Do you know of any reason you shouldn't exercise? No.  Do you own a pedometer or fitness tracker? Yes. Do you have a gym membership? Yes. She has a gym membership through her employer, however the gym is closed due to Covid. Jannie Rink your favorite physical activity? Walking. Other History  Any history of drug abuse or dependence? No.  Are you pregnant or planning on becoming pregnant within 6 months? No.  How many times have you been pregnant? 5 times and 4 children. Any potential unsupportive people in your life? No.  Are you ready to lose weight and become healthier? Yes, \"I need accountability. \"     Other Medical Care    Pt with hyperglycemia, mixed hyperlipidemia, IBS, iron deficiency anemia, vit d deficiency, palpitations, and family hx of stroke, high cholesterol, heart attack, DM, obesity, sleep apnea, hypertension, and lung ca. She takes metformin 500 mg 2 pills BID for hyperglycemia. She admits to abdominal cramping that last 60 minutes and diarrhea after taking metformin with dinner. Pt with anxiety and depression. She takes Lexapro 20 mg and Wellbutrin 150 mg. After 20 years of therapy, she feels that her anxiety is fine. She has headaches and tongue numbness when she does not take Lexapro. She has 2 pills remaining. Pt is agreeable with switching from Lexapro 20 mg to Zoloft 50 mg due to weight positive side effect. Depression Screening    3 most recent PHQ Screens 10/26/2017   Little interest or pleasure in doing things Not at all   Feeling down, depressed, irritable, or hopeless Not at all   Total Score PHQ 2 0        Pt denies any contraindications to VLCD including: history of MI in the last 3 months, Type 1 DM, Type 2 DM, Liver or Kidney disease requiring protein restriction, Recent treatment for Cancer, History of Uric-acid Kidney Stone, Gout, Gall stones, Recent onset of Inflammatory Bowel Disease, or severe Food Allergies. ROS:    Review of Systems negative except as noted above in HPI.        ALLERGIES:    Allergies   Allergen Reactions    Bactrim [Sulfamethoprim Ds] Hives    Codeine Itching    Pcn [Penicillins] Hives    Pcn [Penicillins] Nausea and Vomiting    Sulfur Hives       CURRENT MEDICATIONS:      PAST MEDICAL HISTORY:    Past Medical History:   Diagnosis Date    Back pain     Bilateral ovarian cysts 2016    Dr. Bethel Bond    Chronic left shoulder pain     Chronic pain of left knee     Depression     GARCIA (dyspnea on exertion) 07/2020    Dr. Yuli Nielson.  EF 60-65%    Dysmenorrhea 2016    Dr. Crescencio Gillette H/O: hysterectomy 2016    ovaries intact    Hyperglycemia 2017    Hyperlipidemia 2017    IBS (irritable bowel syndrome)     w diarrhea. [facility]    Iron deficiency anemia 2017    Left knee pain 12/2019    OA. Dr. Nallely Tate    Neck pain 03/2020    DDD. Dr. Marleen Fonseca    Periodic heart flutter 07/2020    symptomatic. Dr. Aubree Rudolph.       PAST SURGICAL HISTORY:    Past Surgical History:   Procedure Laterality Date    HX COLONOSCOPY  2010, 2015    w colon polypectomy. due q 5 yrs.  HX [address]COPY  2010    [address] HERNIA REPAIR  2016    w MICHAEL.  HX ORTHOPAEDIC Left     left 5th toe repair    HX TOTAL ABDOMINAL HYSTERECTOMY  09/2016    ovaries intact. due to thick uterus wall. Dr. Dang Mcnair HX TUBAL LIGATION         FAMILY HISTORY:    Family History   Problem Relation Age of Onset    Stroke Father         multiple    Hypertension Father     Lung Cancer Father         and THROAT CANCER    Thyroid Disease Mother 61        hypothyroid    Diabetes Mother     Hypertension Mother     High Cholesterol Mother     Heart Surgery Mother         for tachycardia.   txd w Ablation    Obesity Mother         Txd w GBP    Prostate Cancer Brother     Diabetes Brother     Hypertension Brother     Sleep Apnea Brother     Obesity Brother     Heart Attack Maternal Grandmother 43    Diabetes Maternal Grandmother     High Cholesterol Maternal Grandmother     Dementia Maternal Grandmother     Other Maternal Grandfather 27        mva    Alzheimer Paternal Grandmother     Hypertension Paternal Grandfather     Diabetes Brother     Hypertension Brother     Obesity Brother     Sleep Apnea Brother        SOCIAL HISTORY:    Social History     Socioeconomic History    Marital status:      Spouse name: Not on file    Number of children: Not on file    Years of education: Not on file    Highest education level: Not on file   Tobacco Use    Smoking status: Current Every Day Smoker     Packs/day: 0.25     Years: 12.00     Pack years: 3.00    Smokeless tobacco: Never Used   Substance and Sexual Activity    Alcohol use: Yes     Alcohol/week: 1.0 standard drinks     Types: 1 Glasses of wine per week     Comment: socially     Drug use: No    Sexual activity: Yes     Partners: Male   Social History Narrative    ** Merged History Encounter **            IMMUNIZATIONS:    Immunization History   Administered Date(s) Administered    Influenza Vaccine Ongo) PF (>6 Mo Flulaval, Fluarix, and >3 Yrs Temo, Fluzone 65188) 10/26/2017    Tdap 08/30/2016         PHYSICAL EXAMINATION    Due to this being a TeleHealth encounter (During [facility]- public health emergency), evaluation of the following organ systems was limited to:    Vital Signs    Visit Vitals  LMP 11/24/2015 (Exact Date)       Weight Metrics 8/10/2020 7/27/2020 7/13/2020 3/5/2020 2/28/2020 2/20/2018 12/12/2017   Weight 297 lb 297 lb 298 lb 8.1 oz 291 lb 294 lb 1.5 oz 281 lb 8 oz 287 lb 3.2 oz   BMI 41.42 kg/m2 41.42 kg/m2 41.63 kg/m2 40.59 kg/m2 41.02 kg/m2 39.26 kg/m2 40.06 kg/m2       General appearance - Well nourished. Well appearing. Well developed. No acute distress. Obese. Head - Normocephalic. Atraumatic. Eyes - Extraocular eye movements intact. Sclera anicteric. Mildly injected sclera. Ears - Hearing is grossly normal bilaterally. Nose - normal and patent. Chest -  No visualized signs of difficulty breathing or respiratory distress  Neurological - awake, alert and oriented to person, place, and time and event. Cranial nerves II through XII intact. Clear speech. Musculoskeletal - Intact x 4 extremities. No pain with movement. Psychological -   normal behavior, dress and thought processes. Good insight. Good eye contact. Normal affect. Appropriate mood. Normal speech. DATA REVIEWED     Labs dated 8/3/2020 reviewed. TSH 2.160. Triglyceride 101. HDL 30. . Labs dated 7/13/2020 reviewed. Na 139. K 3.6. Cr 1.05. ALT 20. AST 14. ALP 83.  Mg 1.9. D dimer 0.44. CBC wnl. EKG on 7/13/2020 showed NSR , HR 64 bpm, QTc 414. ASSESSMENT and PLAN    ICD-10-CM ICD-9-CM    1. Hyperglycemia  K10.2 797.87 METABOLIC PANEL, COMPREHENSIVE      HEMOGLOBIN A1C WITH EAG   2. Irritable bowel syndrome with diarrhea  K58.0 564.1    3. Mixed hyperlipidemia  D51.1 487.6 METABOLIC PANEL, COMPREHENSIVE      LIPID PANEL      INSULIN      URIC ACID   4. Other iron deficiency anemia  D50.8 280.8    5. Anxiety  F41.9 300.00 sertraline (ZOLOFT) 50 mg tablet      THYROID PEROXIDASE (TPO) AB    stable on lexapro 20 mg x 20 yrs   6. Other depression  F32.89 311 sertraline (ZOLOFT) 50 mg tablet    stable on Wellbutrin, Lexapro   7. Diarrhea, unspecified type  C05.4 172.17 METABOLIC PANEL, COMPREHENSIVE      MAGNESIUM    due to IBS vs Metformin vs other   8. Vitamin D deficiency  E55.9 268.9 VITAMIN D, 25 HYDROXY   9. Palpitations  R00.2 785.1    10. Obesity, Class III, BMI 40-49.9 (morbid obesity) ([facility])  E66.01 278.01    11. Family hx of lung cancer  Z80.1 V16.1    12. Family history of stroke  Z82.3 V17.1    13. Family history of high cholesterol  Z83.42 V18.19    14. Family history of heart attack  Z82.49 V17.3    15. FH: diabetes mellitus  Z83.3 V18.0    16. Family history of obesity  Z83.49 V18.19    17. Family history of sleep apnea  Z82.0 V19.8    18. Family history of hypertension  Z82.49 V17.49       Chart reviewed and updated. Based on pt history, lab results, EKG and exam performed today in our office, Sadie Dumont is a good candidate for the [facility] Medically Supervised [facility] Loss Program using the [employer] products for an 1,000-1,200 calorie/day LCD approach. Recommend pt refer to LCD manual if experiencing any side effects once program is initiated or call our office. Referred patient to Alex Handy RD for BS MSWLP to receive [employer] food products and weekly intervention.        Discussed the patient's BMI with her.  The BMI follow up plan is as follows: I have counseled this patient on diet and exercise regimens. Decrease carbohydrates (white foods, sweet foods, sweet drinks and alcohol), increase green leafy vegetables and protein (lean meats and beans) with each meal.  Avoid fried foods. Do not skip meals. Increase water intake. Avoid sugar-sweetened beverages. Diet prescription: LCD (low calorie diet)/1,000-1,200 calories daily using 2-3 meal replacements daily with [place] food/beverage products recommended. Consume a minimum of 2 L (67 oz) of water daily while utilizing LCD. Avoid sugar-sweetened beverages. Exercise prescription: Minimal and as tolerated while using LCD. Sleep prescription: A goal of 7-9 hours of uninterrupted sleep is recommended to turn off the [place]lin hormone to be released from the stomach and triggers appetite while promoting weight gain. Proper rest turns on Leptin hormone to be released from white adipose tissue and promotes weight loss. Continue current medication and care. Stop Lexapro 20 mg. Allow a 2-3 day lauren period before starting Zoloft 50 mg daily. Reduce metformin 500 mg to 1 pill BID. Increase metformin 500 mg to 2 pills qAM and 1 pill qPM, as tolerated. Prescriptions written and sent to pharmacy; medication side effects discussed. Zoloft 50 mg  Lab orders mailed due to Covid-19. Referrals given; patient urged to keep appointments with specialists. Counseled patient on health concerns:  LCD, weight loss goals, sleep hygiene, stress. Weight loss goal of 5-10% in 6-12 months has shown significant improvement in obesity and its health consequences. Offered empathy, support, legitimation, prayers, partnership to patient. Praised patient for progress. Follow-up and Dispositions    · Return in about 1 month (around 11/14/2020) for weight.        Encouraged the [facility] to [facility] to be able to view results and send me any questions or concerns prior to the next visit where we will go over results in detail. Patient was offered a choice/choices in the treatment plan today. Patient expresses understanding of the plan and agrees with recommendations. 48 mins spent face to face with patient and more than 50% of this time spent in counseling and coordinating care and/or discussing treatment plans in reference to The primary encounter diagnosis was Hyperglycemia. Diagnoses of Irritable bowel syndrome with diarrhea, Mixed hyperlipidemia, Other iron deficiency anemia, Anxiety, Other depression, Diarrhea, unspecified type, Vitamin D deficiency, Palpitations, Obesity, Class III, BMI 40-49.9 (morbid obesity) ([address] Family hx of lung cancer, Family history of stroke, Family history of high cholesterol, Family history of heart attack, FH: diabetes mellitus, Family history of obesity, Family history of sleep apnea, and Family history of hypertension were also pertinent to this visit. Sarmad Bush has a reminder for a \"due or due soon\" health maintenance. I have asked pt to contact their primary care provider for follow-up on this health maintenance. [address], NP FOR ALLOWING ME THE PRIVILEGE TO PARTICIPATE IN THE CARE OF OUR MUTUAL PATIENT, Ami Rose WITH RESPECT TO WEIGHT MANAGEMENT. Written by Dr. Martínez Gomez DO. Documentation True and Accepted by Cheryl Poole.  Adrián Webb.

## 2020-10-18 RX ORDER — BUPROPION HYDROCHLORIDE 150 MG/1
TABLET ORAL
Qty: 90 TAB | Refills: 0 | Status: SHIPPED | OUTPATIENT
Start: 2020-10-18 | End: 2021-01-29

## 2020-11-27 RX ORDER — DICLOFENAC POTASSIUM 50 MG/1
50 TABLET, FILM COATED ORAL
Qty: 90 TAB | Refills: 0 | Status: SHIPPED | OUTPATIENT
Start: 2020-11-27 | End: 2021-02-05 | Stop reason: ALTCHOICE

## 2021-01-06 DIAGNOSIS — F32.89 OTHER DEPRESSION: ICD-10-CM

## 2021-01-06 DIAGNOSIS — F41.9 ANXIETY: ICD-10-CM

## 2021-01-17 RX ORDER — SERTRALINE HYDROCHLORIDE 50 MG/1
TABLET, FILM COATED ORAL
Qty: 90 TAB | Refills: 0 | Status: SHIPPED | OUTPATIENT
Start: 2021-01-17 | End: 2021-07-09

## 2021-02-02 ENCOUNTER — APPOINTMENT (OUTPATIENT)
Dept: CT IMAGING | Age: 41
End: 2021-02-02
Attending: EMERGENCY MEDICINE
Payer: COMMERCIAL

## 2021-02-02 ENCOUNTER — HOSPITAL ENCOUNTER (EMERGENCY)
Age: 41
Discharge: HOME | End: 2021-02-02
Attending: EMERGENCY MEDICINE
Payer: COMMERCIAL

## 2021-02-02 ENCOUNTER — APPOINTMENT (OUTPATIENT)
Dept: GENERAL RADIOLOGY | Age: 41
End: 2021-02-02
Attending: EMERGENCY MEDICINE
Payer: COMMERCIAL

## 2021-02-02 VITALS
OXYGEN SATURATION: 100 % | RESPIRATION RATE: 17 BRPM | HEIGHT: 71 IN | SYSTOLIC BLOOD PRESSURE: 114 MMHG | BODY MASS INDEX: 40.74 KG/M2 | DIASTOLIC BLOOD PRESSURE: 54 MMHG | HEART RATE: 65 BPM | TEMPERATURE: 98.1 F | WEIGHT: 291 LBS

## 2021-02-02 DIAGNOSIS — K44.9 HIATAL HERNIA: ICD-10-CM

## 2021-02-02 DIAGNOSIS — R09.1 PLEURISY: ICD-10-CM

## 2021-02-02 DIAGNOSIS — R07.9 ACUTE CHEST PAIN: Primary | ICD-10-CM

## 2021-02-02 LAB
ALBUMIN SERPL-MCNC: 3.5 G/DL (ref 3.5–5)
ALBUMIN/GLOB SERPL: 1 {RATIO} (ref 1.1–2.2)
ALP SERPL-CCNC: 78 U/L (ref 45–117)
ALT SERPL-CCNC: 17 U/L (ref 12–78)
ANION GAP SERPL CALC-SCNC: 10 MMOL/L (ref 5–15)
AST SERPL-CCNC: 15 U/L (ref 15–37)
BASOPHILS # BLD: 0 K/UL (ref 0–0.1)
BASOPHILS NFR BLD: 0 % (ref 0–1)
BILIRUB SERPL-MCNC: 0.2 MG/DL (ref 0.2–1)
BUN SERPL-MCNC: 18 MG/DL (ref 6–20)
BUN/CREAT SERPL: 18 (ref 12–20)
CALCIUM SERPL-MCNC: 8.1 MG/DL (ref 8.5–10.1)
CHLORIDE SERPL-SCNC: 102 MMOL/L (ref 97–108)
CK MB CFR SERPL CALC: NORMAL % (ref 0–2.5)
CK MB SERPL-MCNC: <1 NG/ML (ref 5–25)
CK SERPL-CCNC: 237 U/L (ref 26–192)
CO2 SERPL-SCNC: 27 MMOL/L (ref 21–32)
CREAT SERPL-MCNC: 0.99 MG/DL (ref 0.55–1.02)
D DIMER PPP FEU-MCNC: 0.5 MG/L FEU (ref 0–0.65)
DIFFERENTIAL METHOD BLD: ABNORMAL
EOSINOPHIL # BLD: 0.1 K/UL (ref 0–0.4)
EOSINOPHIL NFR BLD: 1 % (ref 0–7)
ERYTHROCYTE [DISTWIDTH] IN BLOOD BY AUTOMATED COUNT: 14.4 % (ref 11.5–14.5)
GLOBULIN SER CALC-MCNC: 3.4 G/DL (ref 2–4)
GLUCOSE SERPL-MCNC: 91 MG/DL (ref 65–100)
HCT VFR BLD AUTO: 40.3 % (ref 35–47)
HGB BLD-MCNC: 12.5 G/DL (ref 11.5–16)
IMM GRANULOCYTES # BLD AUTO: 0 K/UL
IMM GRANULOCYTES NFR BLD AUTO: 0 %
LYMPHOCYTES # BLD: 4.1 K/UL (ref 0.8–3.5)
LYMPHOCYTES NFR BLD: 34 % (ref 12–49)
MCH RBC QN AUTO: 27.5 PG (ref 26–34)
MCHC RBC AUTO-ENTMCNC: 31 G/DL (ref 30–36.5)
MCV RBC AUTO: 88.6 FL (ref 80–99)
MONOCYTES # BLD: 1.1 K/UL (ref 0–1)
MONOCYTES NFR BLD: 9 % (ref 5–13)
NEUTS BAND NFR BLD MANUAL: 1 % (ref 0–6)
NEUTS SEG # BLD: 6.8 K/UL (ref 1.8–8)
NEUTS SEG NFR BLD: 55 % (ref 32–75)
NRBC # BLD: 0 K/UL (ref 0–0.01)
NRBC BLD-RTO: 0 PER 100 WBC
PLATELET # BLD AUTO: 334 K/UL (ref 150–400)
PMV BLD AUTO: 11 FL (ref 8.9–12.9)
POTASSIUM SERPL-SCNC: 3.6 MMOL/L (ref 3.5–5.1)
PROT SERPL-MCNC: 6.9 G/DL (ref 6.4–8.2)
RBC # BLD AUTO: 4.55 M/UL (ref 3.8–5.2)
RBC MORPH BLD: ABNORMAL
SODIUM SERPL-SCNC: 139 MMOL/L (ref 136–145)
TROPONIN I SERPL-MCNC: <0.05 NG/ML
WBC # BLD AUTO: 12.1 K/UL (ref 3.6–11)

## 2021-02-02 PROCEDURE — 96361 HYDRATE IV INFUSION ADD-ON: CPT

## 2021-02-02 PROCEDURE — 74011250637 HC RX REV CODE- 250/637: Performed by: EMERGENCY MEDICINE

## 2021-02-02 PROCEDURE — 71045 X-RAY EXAM CHEST 1 VIEW: CPT

## 2021-02-02 PROCEDURE — 84484 ASSAY OF TROPONIN QUANT: CPT

## 2021-02-02 PROCEDURE — 74011000258 HC RX REV CODE- 258: Performed by: EMERGENCY MEDICINE

## 2021-02-02 PROCEDURE — 82553 CREATINE MB FRACTION: CPT

## 2021-02-02 PROCEDURE — 85379 FIBRIN DEGRADATION QUANT: CPT

## 2021-02-02 PROCEDURE — 74011000636 HC RX REV CODE- 636: Performed by: EMERGENCY MEDICINE

## 2021-02-02 PROCEDURE — 85025 COMPLETE CBC W/AUTO DIFF WBC: CPT

## 2021-02-02 PROCEDURE — 80053 COMPREHEN METABOLIC PANEL: CPT

## 2021-02-02 PROCEDURE — 71275 CT ANGIOGRAPHY CHEST: CPT

## 2021-02-02 PROCEDURE — 96374 THER/PROPH/DIAG INJ IV PUSH: CPT

## 2021-02-02 PROCEDURE — 99284 EMERGENCY DEPT VISIT MOD MDM: CPT

## 2021-02-02 PROCEDURE — 93005 ELECTROCARDIOGRAM TRACING: CPT

## 2021-02-02 PROCEDURE — 82550 ASSAY OF CK (CPK): CPT

## 2021-02-02 PROCEDURE — 74011250636 HC RX REV CODE- 250/636: Performed by: EMERGENCY MEDICINE

## 2021-02-02 PROCEDURE — 36415 COLL VENOUS BLD VENIPUNCTURE: CPT

## 2021-02-02 RX ORDER — OMEPRAZOLE 40 MG/1
40 CAPSULE, DELAYED RELEASE ORAL DAILY
Qty: 30 CAP | Refills: 0 | Status: SHIPPED | OUTPATIENT
Start: 2021-02-02 | End: 2021-11-05 | Stop reason: ALTCHOICE

## 2021-02-02 RX ORDER — SODIUM CHLORIDE 9 MG/ML
50 INJECTION, SOLUTION INTRAVENOUS
Status: COMPLETED | OUTPATIENT
Start: 2021-02-02 | End: 2021-02-02

## 2021-02-02 RX ORDER — NAPROXEN 500 MG/1
500 TABLET ORAL 2 TIMES DAILY WITH MEALS
Qty: 20 TAB | Refills: 0 | OUTPATIENT
Start: 2021-02-02 | End: 2021-07-18

## 2021-02-02 RX ORDER — SODIUM CHLORIDE 9 MG/ML
10 INJECTION INTRAMUSCULAR; INTRAVENOUS; SUBCUTANEOUS ONCE
Status: COMPLETED | OUTPATIENT
Start: 2021-02-02 | End: 2021-02-02

## 2021-02-02 RX ORDER — KETOROLAC TROMETHAMINE 30 MG/ML
30 INJECTION, SOLUTION INTRAMUSCULAR; INTRAVENOUS
Status: COMPLETED | OUTPATIENT
Start: 2021-02-02 | End: 2021-02-02

## 2021-02-02 RX ORDER — ASPIRIN 325 MG
325 TABLET ORAL ONCE
Status: COMPLETED | OUTPATIENT
Start: 2021-02-02 | End: 2021-02-02

## 2021-02-02 RX ADMIN — KETOROLAC TROMETHAMINE 30 MG: 30 INJECTION, SOLUTION INTRAMUSCULAR at 23:05

## 2021-02-02 RX ADMIN — SODIUM CHLORIDE 10 ML: 9 INJECTION INTRAMUSCULAR; INTRAVENOUS; SUBCUTANEOUS at 22:47

## 2021-02-02 RX ADMIN — IOPAMIDOL 80 ML: 755 INJECTION, SOLUTION INTRAVENOUS at 22:46

## 2021-02-02 RX ADMIN — ASPIRIN 325 MG: 325 TABLET, FILM COATED ORAL at 21:21

## 2021-02-02 RX ADMIN — SODIUM CHLORIDE 50 ML: 900 INJECTION, SOLUTION INTRAVENOUS at 22:46

## 2021-02-02 NOTE — Clinical Note
[address] 
[facility] EMERGENCY [facility] 
[address] [address] 
[phone] Work/School Note Date: 2/2/2021 To Whom It May concern: 
 
Deniz Frazier was seen and treated today in the emergency room by the following provider(s): 
Attending Provider: Laura Calderon MD. Deniz Frazier is excused from work/school on 02/02/21 and 02/03/21. She is medically clear to return to work/school on 2/4/2021. Sincerely, Taylor Terry MD  
 
 

Patent

## 2021-02-03 NOTE — ED TRIAGE NOTES
Mid sternal chest pain for few days. Worst today. Reproducible with palpation./ States that it hurts worse with deep breaths.

## 2021-02-03 NOTE — ED PROVIDER NOTES
The patient is a 40-year-old female with multiple chronic medical problems Eliquis depression, anxiety, hypoglycemia, hyperlipidemia, IBS, degenerative disease of the cervical spine, status post hysterectomy who presents to the ED with a complaint of midsternal chest discomfort that began 3 days ago described as sharp and stabbing in nature, severity 7 out of 10, worse with deep breaths and movement, radiating to her back and shoulders; no relieving factors. The patient took diclofenac without any significant improvement of symptoms or discomfort. Patient denies any fever and chills, sore throat, cough or congestion, headache, neck and back pain, chest pain or shortness of air with exertion, nausea, vomiting, diarrhea, constipation, dysuria, dizziness, extremity weakness, numbness and pain, prior history of the same. Past Medical History:   Diagnosis Date    Back pain     Bilateral ovarian cysts 2016    Dr. Nabeel Lind    Chronic left shoulder pain     Chronic pain of left knee     Depression     GARCIA (dyspnea on exertion) 07/2020    Dr. Courtney Mann.  EF 60-65%    Dysmenorrhea 2016    Dr. Sadie Coelho H/O: hysterectomy 2016    ovaries intact    Hyperglycemia 2017    Hyperlipidemia 2017    IBS (irritable bowel syndrome)     w diarrhea. [facility]    Iron deficiency anemia 2017    Left knee pain 12/2019    OA. Dr. Guerrero Pae    Neck pain 03/2020    DDD. Dr. Calos Garza    Periodic heart flutter 07/2020    symptomatic. Dr. Courtney Mann.       Past Surgical History:   Procedure Laterality Date    HX COLONOSCOPY  2010, 2015    w colon polypectomy. due q 5 yrs.  HX [address]OSCOPY  2010    [address] HERNIA REPA[address]  2016    w MICHAEL.  HX ORTHOPAEDIC Left     left 5th toe repair    HX TOTAL ABDOMINAL HYSTERECTOMY  09/2016    ovaries intact. due to thick uterus wall.   Dr. Quintin Lou HX TUBAL LIGATION           Family History:   Problem Relation Age of Onset    Stroke Father         multiple    Hypertension Father     Lung Cancer Father         and THROAT CANCER    Thyroid Disease Mother 61        hypothyroid    Diabetes Mother     Hypertension Mother     High Cholesterol Mother     Heart Surgery Mother         for tachycardia. txd w Ablation    Obesity Mother         Txd w GBP    Prostate Cancer Brother     Diabetes Brother     Hypertension Brother     Sleep Apnea Brother     Obesity Brother     Heart Attack Maternal Grandmother 43    Diabetes Maternal Grandmother     High Cholesterol Maternal Grandmother     Dementia Maternal Grandmother     Other Maternal Grandfather 27        mva    Alzheimer Paternal Grandmother     Hypertension Paternal Grandfather     Diabetes Brother     Hypertension Brother     Obesity Brother     Sleep Apnea Brother        Social History     Socioeconomic History    Marital status:      Spouse name: Not on file    Number of children: Not on file    Years of education: Not on file    Highest education level: Not on file   Occupational History    Not on file   Social Needs    Financial resource strain: Not on file    Food insecurity     Worry: Not on file     Inability: Not on file   Hithru needs     Medical: Not on file     Non-medical: Not on file   Tobacco Use    Smoking status: Current Every Day Smoker     Packs/day: 0.25     Years: 12.00     Pack years: 3.00    Smokeless tobacco: Never Used   Substance and Sexual Activity    Alcohol use:  Yes     Alcohol/week: 1.0 standard drinks     Types: 1 Glasses of wine per week     Comment: socially     Drug use: No    Sexual activity: Yes     Partners: Male   Lifestyle    Physical activity     Days per week: Not on file     Minutes per session: Not on file    Stress: Not on file   Relationships    Social connections     Talks on phone: Not on file     Gets together: Not on file     Attends Christian service: Not on file     Active member of club or organization: Not on file     Attends meetings of clubs or organizations: Not on file     Relationship status: Not on file    Intimate partner violence     Fear of current or ex partner: Not on file     Emotionally abused: Not on file     Physically abused: Not on file     Forced sexual activity: Not on file   Other Topics Concern    Not on file   Social History Narrative    ** Merged History Encounter **              ALLERGIES: Bactrim [sulfamethoprim ds], Codeine, Pcn [penicillins], Pcn [penicillins], and Sulfur    Review of Systems   All other systems reviewed and are negative. Vitals:    02/02/21 2109   BP: (!) 144/65   Pulse: 73   Resp: 18   Temp: 98.1 °F (36.7 °C)   SpO2: 97%   Weight: 132 kg (291 lb)   Height: 5' 11\" (1.803 m)            Physical Exam  Vitals signs and nursing note reviewed. Exam conducted with a chaperone present. CONSTITUTIONAL: Well-appearing; well-nourished; in no apparent distress  HEAD: Normocephalic; atraumatic  EYES: PERRL; EOM intact; conjunctiva and sclera are clear bilaterally. ENT: No rhinorrhea; normal pharynx with no tonsillar hypertrophy; mucous membranes pink/moist, no erythema, no exudate. NECK: Supple; non-tender; no cervical lymphadenopathy  CARD: Normal S1, S2; no murmurs, rubs, or gallops. Regular rate and rhythm. RESP: Normal respiratory effort; breath sounds clear and equal bilaterally; no wheezes, rhonchi, or rales. Midsternal to left-sided chest wall tenderness on palpation and movement of the torso  ABD: Normal bowel sounds; non-distended; non-tender; no palpable organomegaly, no masses, no bruits. Back Exam: Normal inspection; no vertebral point tenderness, no CVA tenderness. Normal range of motion. EXT: Normal ROM in all four extremities; non-tender to palpation; no swelling or deformity; distal pulses are normal, no edema. SKIN: Warm; dry; no rash.   NEURO:Alert and oriented x 3, coherent, BLESSING-XII grossly intact, sensory and motor are non-focal.      MDM  Number of Diagnoses or Management Options  Diagnosis management comments: Assessment: 70-year-old female with intermittent episodes of left-sided chest pain that appear pleuritic in nature. The patient has hemodynamically stable. She has a fairly benign exam.  Differential diagnosis include pleurisy, pneumonia, ACS, electrolyte abnormality, VTE and dissection, include COVID-19 infection    Plan: Lab/EKG/chest x-ray/IV fluid/aspirin/CTA of the chest/serial exam/education, reassurance, symptomatic treatment/ Monitor and Reevaluate. Amount and/or Complexity of Data Reviewed  Clinical lab tests: ordered and reviewed  Tests in the radiology section of CPT®: ordered and reviewed  Tests in the medicine section of CPT®: reviewed and ordered  Discussion of test results with the performing providers: yes  Decide to obtain previous medical records or to obtain history from someone other than the patient: yes  Obtain history from someone other than the patient: yes  Review and summarize past medical records: yes  Discuss the patient with other providers: yes  Independent visualization of images, tracings, or specimens: yes    Risk of Complications, Morbidity, and/or Mortality  Presenting problems: moderate  Diagnostic procedures: moderate  Management options: moderate           Procedures    ED EKG interpretation:  Rhythm: normal sinus rhythm; and regular . Rate (approx.): 68; Axis: normal; P wave: normal; QRS interval: normal ; ST/T wave: non-specific changes; in  Lead: Diffusely; Other findings: Borderline EKG ulcer. This EKG was interpreted by Sofia Candelaria MD,ED Provider. XRAY INTERPRETATION (ED MD)  Chest Xray  Nonspecific patchy interstitial infiltrate n. Normal heart size. No bony abnormalities. Tima Aviles MD 10:32 PM    Progress Note:   Pt has been reexamined by Sofia Candelaria MD. Pt is feeling much better. Symptoms have improved.  All available results have been reviewed with pt and any available family. Pt understands sx, dx, and tx in ED. Care plan has been outlined and questions have been answered. Pt is ready to go home. Will send home on chest pain/pleurisy instruction. Outpatient referral with PCP as needed. Written by Mp Gilbert MD,10:35 PM    .   .

## 2021-02-04 LAB
ATRIAL RATE: 68 BPM
CALCULATED P AXIS, ECG09: 61 DEGREES
CALCULATED R AXIS, ECG10: 62 DEGREES
CALCULATED T AXIS, ECG11: 47 DEGREES
DIAGNOSIS, 93000: NORMAL
P-R INTERVAL, ECG05: 148 MS
Q-T INTERVAL, ECG07: 422 MS
QRS DURATION, ECG06: 94 MS
QTC CALCULATION (BEZET), ECG08: 448 MS
VENTRICULAR RATE, ECG03: 68 BPM

## 2021-02-05 ENCOUNTER — OFFICE VISIT (OUTPATIENT)
Dept: PRIMARY CARE CLINIC | Age: 41
End: 2021-02-05
Payer: COMMERCIAL

## 2021-02-05 VITALS
BODY MASS INDEX: 40.46 KG/M2 | DIASTOLIC BLOOD PRESSURE: 83 MMHG | SYSTOLIC BLOOD PRESSURE: 133 MMHG | TEMPERATURE: 98.1 F | RESPIRATION RATE: 18 BRPM | HEIGHT: 71 IN | WEIGHT: 289 LBS | OXYGEN SATURATION: 97 % | HEART RATE: 63 BPM

## 2021-02-05 DIAGNOSIS — E66.01 OBESITY, MORBID (HCC): ICD-10-CM

## 2021-02-05 DIAGNOSIS — F41.9 ANXIETY: ICD-10-CM

## 2021-02-05 DIAGNOSIS — K76.0 HEPATIC STEATOSIS: ICD-10-CM

## 2021-02-05 DIAGNOSIS — R06.83 SNORES: ICD-10-CM

## 2021-02-05 DIAGNOSIS — Z90.710 S/P HYSTERECTOMY: ICD-10-CM

## 2021-02-05 DIAGNOSIS — Z72.0 TOBACCO ABUSE: ICD-10-CM

## 2021-02-05 DIAGNOSIS — E78.2 MIXED HYPERLIPIDEMIA: ICD-10-CM

## 2021-02-05 DIAGNOSIS — K44.9 HIATAL HERNIA: Primary | ICD-10-CM

## 2021-02-05 PROBLEM — I49.8 PERIODIC HEART FLUTTER: Status: RESOLVED | Noted: 2020-07-01 | Resolved: 2021-02-05

## 2021-02-05 PROBLEM — M25.562 LEFT KNEE PAIN: Status: RESOLVED | Noted: 2019-12-01 | Resolved: 2021-02-05

## 2021-02-05 PROBLEM — M54.2 NECK PAIN: Status: RESOLVED | Noted: 2020-03-01 | Resolved: 2021-02-05

## 2021-02-05 PROBLEM — D50.9 IRON DEFICIENCY ANEMIA: Status: RESOLVED | Noted: 2017-01-01 | Resolved: 2021-02-05

## 2021-02-05 PROCEDURE — 99214 OFFICE O/P EST MOD 30 MIN: CPT | Performed by: INTERNAL MEDICINE

## 2021-02-05 PROCEDURE — 99406 BEHAV CHNG SMOKING 3-10 MIN: CPT | Performed by: INTERNAL MEDICINE

## 2021-02-05 RX ORDER — IBUPROFEN 200 MG
1 TABLET ORAL EVERY 24 HOURS
Qty: 30 PATCH | Refills: 0 | Status: SHIPPED | OUTPATIENT
Start: 2021-02-05 | End: 2021-03-07

## 2021-02-05 NOTE — PROGRESS NOTES
Written by Katerin Vu, as dictated by Dr. Miles Lopez MD.    Julio César Wilson (: 1980) is a 41 y.o. female, new patient, here for evaluation of the following chief complaint(s):  Establish Care (labs)     SUBJECTIVE/OBJECTIVE:  HPI  Pt presents today to establish care. She had been previously been a patient of JOAN Hernandez and saw Dr. Raymond Leigh a few times for weight loss. It is very important for her to lose weight and overall she has been very successful. Specifically, she has lost weight from 297 lb on 08/10/20 to 289 lb today. She is prediabetic and takes metformin for this. She also snores but has not had a sleep study done. Endorses brain fog and daytime sleepiness. She presented to the [facility] ED c/o chest pain and was prescribed omeprazole on 21 for hiatal hernia. It turns out that she had been taking a lot of naproxen which likely caused the hiatal hernia. She is currently taking Wellbutrin to help her stop smoking. She is smokes 3 packs a week. She had been taking Lexapro for 12 years until Dr. Raymond Leigh explained to her that it can make it difficult to lose weight. After she learned this, she switched to Zoloft. She has been going to PT for degenerative disc disease and takes diclofenac for this. JOAN Hernandez told her she should have her kidneys checked while on this medication, so she inquires if she can have this checked today. She is s/p complete hysterectomy but still has ovaries, so she does not need Pap smears. She had a benign mass removed from her breast in  and gets this checked regularly. She is UTD on mammograms. She works as an  and is currently working from home. She gets the flu vaccine every year.      Patient Active Problem List   Diagnosis Code    Eczema L30.9    Depression F32.9    Chronic pain of right knee M25.561, G89.29    Obesity, morbid ([facility] [address] E66.01    Chronic left shoulder pain M25.512, G89.29    Hyperglycemia R73.9    Hyperlipidemia E78.5    IBS (irritable bowel syndrome) K58.9    S/P hysterectomy Z90.710        Current Outpatient Medications on File Prior to Visit   Medication Sig Dispense Refill    omeprazole (PRILOSEC) 40 mg capsule Take 1 Cap by mouth daily. 30 Cap 0    naproxen (NAPROSYN) 500 mg tablet Take 1 Tab by mouth two (2) times daily (with meals). 20 Tab 0    buPROPion XL (WELLBUTRIN XL) 150 mg tablet TAKE 1 TABLET BY MOUTH EVERY MORNING. PCP no longer at this practice - must establish with new PCP for future refills. 90 Tab 0    sertraline (ZOLOFT) 50 mg tablet TAKE 1 TABLET BY MOUTH ONCE DAILY 90 Tab 0    metFORMIN (GLUCOPHAGE) 500 mg tablet Take 2 Tabs by mouth two (2) times daily (with meals). Indications: prevention of type 2 diabetes mellitus 120 Tab 5    [DISCONTINUED] diclofenac potassium (CATAFLAM) 50 mg tablet Take 1 Tab by mouth two (2) times daily as needed for Pain. 90 Tab 0    [DISCONTINUED] cyclobenzaprine (FLEXERIL) 10 mg tablet Take 1 Tab by mouth nightly. (Patient taking differently: Take 10 mg by mouth as needed.) 30 Tab 0     No current facility-administered medications on file prior to visit. Allergies   Allergen Reactions    Bactrim [Sulfamethoprim Ds] Hives    Codeine Itching    Pcn [Penicillins] Hives    Pcn [Penicillins] Nausea and Vomiting    Sulfur Hives       Past Medical History:   Diagnosis Date    Back pain     Bilateral ovarian cysts 2016    Dr. Evelyn Price    Chronic left shoulder pain     Chronic pain of left knee     Depression     GARCIA (dyspnea on exertion) 07/2020    Dr. Nnamdi Piper.  EF 60-65%    Dysmenorrhea 2016    Dr. Angella Marquez H/O: hysterectomy 2016    ovaries intact    Hyperglycemia 2017    Hyperlipidemia 2017    IBS (irritable bowel syndrome)     w diarrhea. [facility]    Iron deficiency anemia 2017    Left knee pain 12/2019    OA. Dr. Ricardo Mackey    Neck pain 03/2020    DDD.   Dr. Juan Oglesby Periodic heart flutter 07/2020    symptomatic. Dr. Anshu Singh.       Past Surgical History:   Procedure Laterality Date    HX COLONOSCOPY  2010, 2015    w colon polypectomy. due q 5 yrs.  HX ENDOSCOPY  2010    in [address] HERNIA REPAIR  2016    w MICHAEL.  HX ORTHOPAEDIC Left     left 5th toe repair    HX TOTAL ABDOMINAL HYSTERECTOMY  09/2016    ovaries intact. due to thick uterus wall. Dr. Cagle Homes HX TUBAL LIGATION         Family History   Problem Relation Age of Onset    Stroke Father         multiple    Hypertension Father     Lung Cancer Father         and THROAT CANCER    Thyroid Disease Mother 61        hypothyroid    Diabetes Mother     Hypertension Mother     High Cholesterol Mother     Heart Surgery Mother         for tachycardia.   txd w Ablation    Obesity Mother         Txd w GBP    Prostate Cancer Brother     Diabetes Brother     Hypertension Brother     Sleep Apnea Brother     Obesity Brother     Heart Attack Maternal Grandmother 43    Diabetes Maternal Grandmother     High Cholesterol Maternal Grandmother     Dementia Maternal Grandmother     Other Maternal Grandfather 27        mva    Alzheimer Paternal Grandmother     Hypertension Paternal Grandfather     Diabetes Brother     Hypertension Brother     Obesity Brother     Sleep Apnea Brother        Social History     Socioeconomic History    Marital status:      Spouse name: Not on file    Number of children: Not on file    Years of education: Not on file    Highest education level: Not on file   Occupational History    Not on file   Social Needs    Financial resource strain: Not on file    Food insecurity     Worry: Not on file     Inability: Not on file   [employer] needs     Medical: Not on file     Non-medical: Not on file   Tobacco Use    Smoking status: Current Every Day Smoker     Packs/day: 0.25     Years: 12.00     Pack years: 3.00    Smokeless tobacco: Never Used   Substance and Sexual Activity    Alcohol use:  Yes     Alcohol/week: 1.0 standard drinks     Types: 1 Glasses of wine per week     Comment: socially     Drug use: No    Sexual activity: Yes     Partners: Male   Lifestyle    Physical activity     Days per week: Not on file     Minutes per session: Not on file    Stress: Not on file   Relationships    Social connections     Talks on phone: Not on file     Gets together: Not on file     Attends Shinto service: Not on file     Active member of club or organization: Not on file     Attends meetings of clubs or organizations: Not on file     Relationship status: Not on file    Intimate partner violence     Fear of current or ex partner: Not on file     Emotionally abused: Not on file     Physically abused: Not on file     Forced sexual activity: Not on file   Other Topics Concern    Not on file   Social History Narrative    ** Merged History Encounter **            Admission on 02/02/2021, Discharged on 02/02/2021   Component Date Value Ref Range Status    Ventricular Rate 02/02/2021 68  BPM Final    Atrial Rate 02/02/2021 68  BPM Final    P-R Interval 02/02/2021 148  ms Final    QRS Duration 02/02/2021 94  ms Final    Q-T Interval 02/02/2021 422  ms Final    QTC Calculation (Bezet) 02/02/2021 448  ms Final    Calculated P Axis 02/02/2021 61  degrees Final    Calculated R Axis 02/02/2021 62  degrees Final    Calculated T Axis 02/02/2021 47  degrees Final    Diagnosis 02/02/2021    Final                    Value:Normal sinus rhythm  When compared with ECG of 13-JUL-2020 23:48,  No significant change was found  Confirmed by Juliana Adhikari MD, Shay Hollingsworth ([id number]) on 2/4/2021 10:56:53 AM      WBC 02/02/2021 12.1* 3.6 - 11.0 K/uL Final    RBC 02/02/2021 4.55  3.80 - 5.20 M/uL Final    HGB 02/02/2021 12.5  11.5 - 16.0 g/dL Final    HCT 02/02/2021 40.3  35.0 - 47.0 % Final    MCV 02/02/2021 88.6  80.0 - 99.0 FL Final    MCH 02/02/2021 27.5  26.0 - 34.0 PG Final    MCHC 02/02/2021 31.0  30.0 - 36.5 g/dL Final    RDW 02/02/2021 14.4  11.5 - 14.5 % Final    PLATELET 57/15/3068 742  150 - 400 K/uL Final    MPV 02/02/2021 11.0  8.9 - 12.9 FL Final    NRBC 02/02/2021 0.0  0  WBC Final    ABSOLUTE NRBC 02/02/2021 0.00  0.00 - 0.01 K/uL Final    NEUTROPHILS 02/02/2021 55  32 - 75 % Final    BAND NEUTROPHILS 02/02/2021 1  0 - 6 % Final    LYMPHOCYTES 02/02/2021 34  12 - 49 % Final    MONOCYTES 02/02/2021 9  5 - 13 % Final    EOSINOPHILS 02/02/2021 1  0 - 7 % Final    BASOPHILS 02/02/2021 0  0 - 1 % Final    IMMATURE GRANULOCYTES 02/02/2021 0  % Final    ABS. NEUTROPHILS 02/02/2021 6.8  1.8 - 8.0 K/UL Final    ABS. LYMPHOCYTES 02/02/2021 4.1* 0.8 - 3.5 K/UL Final    ABS. MONOCYTES 02/02/2021 1.1* 0.0 - 1.0 K/UL Final    ABS. EOSINOPHILS 02/02/2021 0.1  0.0 - 0.4 K/UL Final    ABS. BASOPHILS 02/02/2021 0.0  0.0 - 0.1 K/UL Final    ABS. IMM. GRANS. 02/02/2021 0.0  K/UL Final    DF 02/02/2021 MANUAL    Final    RBC COMMENTS 02/02/2021 NORMOCYTIC, NORMOCHROMIC    Final    CK 02/02/2021 237* 26 - 192 U/L Final    Sodium 02/02/2021 139  136 - 145 mmol/L Final    Potassium 02/02/2021 3.6  3.5 - 5.1 mmol/L Final    Chloride 02/02/2021 102  97 - 108 mmol/L Final    CO2 02/02/2021 27  21 - 32 mmol/L Final    Anion gap 02/02/2021 10  5 - 15 mmol/L Final    Glucose 02/02/2021 91  65 - 100 mg/dL Final    BUN 02/02/2021 18  6 - 20 MG/DL Final    Creatinine 02/02/2021 0.99  0.55 - 1.02 MG/DL Final    BUN/Creatinine ratio 02/02/2021 18  12 - 20   Final    GFR est AA 02/02/2021 >60  >60 ml/min/1.73m2 Final    GFR est non-AA 02/02/2021 >60  >60 ml/min/1.73m2 Final    Estimated GFR is calculated using the IDMS-traceable Modification of Diet in Renal Disease (MDRD) Study equation, reported for both  Americans (GFRAA) and non- Americans (GFRNA), and normalized to 1.73m2 body surface area. The physician must decide which value applies to the patient.     Calcium 02/02/2021 8.1* 8.5 - 10.1 MG/DL Final    Bilirubin, total 02/02/2021 0.2  0.2 - 1.0 MG/DL Final    ALT (SGPT) 02/02/2021 17  12 - 78 U/L Final    AST (SGOT) 02/02/2021 15  15 - 37 U/L Final    Alk. phosphatase 02/02/2021 78  45 - 117 U/L Final    Protein, total 02/02/2021 6.9  6.4 - 8.2 g/dL Final    Albumin 02/02/2021 3.5  3.5 - 5.0 g/dL Final    Globulin 02/02/2021 3.4  2.0 - 4.0 g/dL Final    A-G Ratio 02/02/2021 1.0* 1.1 - 2.2   Final    D-dimer 02/02/2021 0.50  0.00 - 0.65 mg/L FEU Final    Comment: (NOTE)  The combination of a low pre-test probability based on Wells criteria  and a D-Dimer result below the cutoff value of 0.5 mg/L increases the   negative predictive value for DVT to %.  Troponin-I, Qt. 02/02/2021 <0.05  <0.05 ng/mL Final    Comment: The presence of detectable troponin above the reference range indicates myocardial injury which may be due to ischemia, myocarditis, trauma, etc.  Clinical correlation is necessary to establish the significance of this finding. Sequential testing is recommended to determine if the typical rise and fall of cTnI is demonstrated. Note:  Cardiac troponin I has a relatively long half life and may be present well after the CK MB has returned to baseline. The reference range is based on the 99th percentile of the referent population.  CK - MB 02/02/2021 <1.0  <3.6 NG/ML Final    Comment: CK MB increases in 3-4 hours after myocardial injury with a peak at 15-20 and return to baseline by 24-36 hours. CK MB is relatively specific for myocardial injury but can be elevated due to non-cardiac injury such as reparative changes following skeletal muscle injury, inflammatory or degenerative skeletal muscle injury, etc.  Troponin is more cardiospecific and thus is the preferred test.      CK-MB Index 02/02/2021 Cannot be calculated  0.0 - 2.5   Final     Review of Systems   Constitutional: Positive for fatigue.  Negative for activity change and unexpected weight change. HENT: Negative for congestion, hearing loss, rhinorrhea and sore throat. Eyes: Negative for discharge. Respiratory: Negative for cough, chest tightness and shortness of breath.         +snores   Cardiovascular: Negative for leg swelling. Gastrointestinal: Positive for abdominal pain (epigastric). Negative for constipation and diarrhea. Genitourinary: Negative for dysuria, flank pain, frequency and urgency. Musculoskeletal: Positive for back pain. Negative for arthralgias and myalgias. Skin: Negative for color change and rash. Neurological: Negative for dizziness, light-headedness and headaches. Psychiatric/Behavioral: Negative for dysphoric mood and sleep disturbance. The patient is not nervous/anxious. Visit Vitals  /83 (BP 1 Location: Left upper arm, BP Patient Position: Sitting)   Pulse 63   Temp 98.1 °F (36.7 °C) (Temporal)   Resp 18   Ht 5' 11\" (1.803 m)   Wt 289 lb (131.1 kg)   LMP 11/24/2015 (Exact Date)   SpO2 97%   BMI 40.31 kg/m²     Physical Exam  Vitals signs and nursing note reviewed. Constitutional:       General: She is not in acute distress. Appearance: Normal appearance. She is well-developed. She is not diaphoretic. HENT:      Right Ear: External ear normal.      Left Ear: External ear normal.   Eyes:      General: No scleral icterus. Right eye: No discharge. Left eye: No discharge. Extraocular Movements: Extraocular movements intact. Conjunctiva/sclera: Conjunctivae normal.   Neck:      Musculoskeletal: Normal range of motion and neck supple. Cardiovascular:      Rate and Rhythm: Normal rate and regular rhythm. Pulmonary:      Effort: Pulmonary effort is normal.      Breath sounds: Normal breath sounds. No wheezing. Abdominal:      General: Bowel sounds are normal.      Palpations: Abdomen is soft. Tenderness: There is abdominal tenderness in the epigastric area.    Lymphadenopathy:      Cervical: No cervical adenopathy. Neurological:      Mental Status: She is alert and oriented to person, place, and time. Psychiatric:         Mood and Affect: Mood and affect normal.         ASSESSMENT/PLAN:  1. Hiatal hernia  I instructed her to continue taking omeprazole qAM on an empty stomach 30 minutes before breakfast for 40 days. 2. Hepatic steatosis  We discussed that this showed up on her CTA chest and that it can be improved with weight loss. 3. Obesity, morbid ([address]  She is showing a downward trend in her weight and is on the right direction. I commended the pt on their healthy lifestyle choices and routines. Explained that they should be walking 5,000 steps daily to maintain conditioning and weight and increase to at least 10,000 steps to work on losing weight. 4. Tobacco abuse  -     nicotine (NICODERM CQ) 14 mg/24 hr patch; 1 Patch by TransDERmal route every twenty-four (24) hours for 30 days. , Normal, Disp-30 Patch, R-0 sent to pharmacy. Potential side effects were discussed. Pt currently smokes 3 packs weekly. I strongly encouraged the pt to stop smoking and explained that continuing to smoke may result in lung cancer and chronic supplemental oxygen use. Pt is not currently ready to stop smoking. Time spent counseling pt: 5 minutes. 5. Anxiety  Stable, follows on Zoloft. 6. S/P hysterectomy  Stable at this time. 7. Mixed hyperlipidemia  -     METABOLIC PANEL, COMPREHENSIVE; Future  -     CBC W/O DIFF; Future  -     LIPID PANEL; Future  -     TSH 3RD GENERATION; Future  I ordered fasting labs for her to come have done soon. 8. Snores  -     SLEEP MEDICINE REFERRAL  I referred her to sleep medicine to have a sleep study done. Explained that untreated sleep apnea can lead to memory issues, daytime sleepiness, and other problems. This plan was reviewed with the patient and patient agrees. All questions were answered.     This scribe documentation was reviewed by me and accurately reflects the examination and decisions made by me. An electronic signature was used to authenticate this note.   -- Arlene Maradiaga

## 2021-07-05 DIAGNOSIS — F32.89 OTHER DEPRESSION: ICD-10-CM

## 2021-07-05 DIAGNOSIS — F41.9 ANXIETY: ICD-10-CM

## 2021-07-09 RX ORDER — SERTRALINE HYDROCHLORIDE 50 MG/1
TABLET, FILM COATED ORAL
Qty: 90 TABLET | Refills: 0 | Status: SHIPPED | OUTPATIENT
Start: 2021-07-09 | End: 2021-09-29

## 2021-07-17 ENCOUNTER — HOSPITAL ENCOUNTER (EMERGENCY)
Age: 41
Discharge: HOME | End: 2021-07-18
Attending: EMERGENCY MEDICINE
Payer: COMMERCIAL
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
## 2021-07-17 DIAGNOSIS — M54.50 ACUTE RIGHT-SIDED LOW BACK PAIN WITHOUT SCIATICA: ICD-10-CM

## 2021-07-17 DIAGNOSIS — R10.9 RIGHT FLANK DISCOMFORT: Primary | ICD-10-CM

## 2021-07-17 PROCEDURE — 99284 EMERGENCY DEPT VISIT MOD MDM: CPT

## 2021-07-17 RX ORDER — HYDROCODONE BITARTRATE AND ACETAMINOPHEN 5; 325 MG/1; MG/1
1 TABLET ORAL ONCE
Status: COMPLETED | OUTPATIENT
Start: 2021-07-18 | End: 2021-07-18

## 2021-07-17 RX ORDER — ACETAMINOPHEN 325 MG/1
650 TABLET ORAL ONCE
Status: COMPLETED | OUTPATIENT
Start: 2021-07-18 | End: 2021-07-18

## 2021-07-17 RX ORDER — IBUPROFEN 400 MG/1
800 TABLET ORAL ONCE
Status: COMPLETED | OUTPATIENT
Start: 2021-07-18 | End: 2021-07-18

## 2021-07-17 RX ORDER — CYCLOBENZAPRINE HCL 10 MG
10 TABLET ORAL
Status: COMPLETED | OUTPATIENT
Start: 2021-07-18 | End: 2021-07-18

## 2021-07-18 ENCOUNTER — APPOINTMENT (OUTPATIENT)
Dept: CT IMAGING | Age: 41
End: 2021-07-18
Attending: EMERGENCY MEDICINE
Payer: COMMERCIAL

## 2021-07-18 VITALS
OXYGEN SATURATION: 97 % | RESPIRATION RATE: 18 BRPM | DIASTOLIC BLOOD PRESSURE: 53 MMHG | SYSTOLIC BLOOD PRESSURE: 115 MMHG | HEART RATE: 63 BPM

## 2021-07-18 PROCEDURE — 74011250637 HC RX REV CODE- 250/637: Performed by: EMERGENCY MEDICINE

## 2021-07-18 PROCEDURE — 74176 CT ABD & PELVIS W/O CONTRAST: CPT

## 2021-07-18 RX ORDER — CYCLOBENZAPRINE HCL 10 MG
10 TABLET ORAL
Qty: 9 TABLET | Refills: 0 | Status: SHIPPED | OUTPATIENT
Start: 2021-07-18 | End: 2021-07-28

## 2021-07-18 RX ORDER — TRAMADOL HYDROCHLORIDE 50 MG/1
50 TABLET ORAL
Qty: 9 TABLET | Refills: 0 | Status: SHIPPED | OUTPATIENT
Start: 2021-07-18 | End: 2021-07-21

## 2021-07-18 RX ORDER — IBUPROFEN 800 MG/1
800 TABLET ORAL
Qty: 20 TABLET | Refills: 0 | Status: SHIPPED | OUTPATIENT
Start: 2021-07-18 | End: 2021-07-25

## 2021-07-18 RX ORDER — LIDOCAINE 50 MG/G
PATCH TOPICAL
Qty: 15 EACH | Refills: 0 | Status: SHIPPED | OUTPATIENT
Start: 2021-07-18 | End: 2022-09-23 | Stop reason: ALTCHOICE

## 2021-07-18 RX ADMIN — HYDROCODONE BITARTRATE AND ACETAMINOPHEN 1 TABLET: 5; 325 TABLET ORAL at 00:22

## 2021-07-18 RX ADMIN — ACETAMINOPHEN 650 MG: 325 TABLET ORAL at 00:22

## 2021-07-18 RX ADMIN — IBUPROFEN 800 MG: 400 TABLET ORAL at 00:22

## 2021-07-18 RX ADMIN — CYCLOBENZAPRINE 10 MG: 10 TABLET, FILM COATED ORAL at 00:22

## 2021-07-18 NOTE — ED TRIAGE NOTES
States that her knee buckled causing her fall onto furniture, striking her right flank area.  Also complains of pain in the right knee

## 2021-07-18 NOTE — ED PROVIDER NOTES
The history is provided by the patient. Fall  The accident occurred less than 1 hour ago. The fall occurred while standing. She fell from a height of 1 - 2 ft. She landed on hard floor. Point of impact: right side. Pain location: right knee & right side. The pain is severe. She was not ambulatory at the scene. Associated symptoms include numbness (right leg) and abdominal pain. Pertinent negatives include no nausea, no vomiting, no hematuria and no loss of consciousness. The symptoms are aggravated by activity, standing and pressure on injury. She has tried nothing for the symptoms. Past Medical History:   Diagnosis Date    Back pain     Bilateral ovarian cysts 2016    Dr. Hema Nunez    Chronic left shoulder pain     Chronic pain of left knee     Depression     GARCIA (dyspnea on exertion) 07/2020    Dr. Josie Kenyon.  EF 60-65%    Dysmenorrhea 2016    Dr. [place] Cluster H/O: hysterectomy 2016    ovaries intact    Hyperglycemia 2017    Hyperlipidemia 2017    IBS (irritable bowel syndrome)     w diarrhea. [facility]    Iron deficiency anemia 2017    Left knee pain 12/2019    OA. Dr. Diaz Sick    Neck pain 03/2020    DDD. Dr. Rita Faust    Periodic heart flutter 07/2020    symptomatic. Dr. Josie Kenyon.       Past Surgical History:   Procedure Laterality Date    HX COLONOSCOPY  2010, 2015    w colon polypectomy. due q 5 yrs.  HX ENDOSCOPY  2010    in [address] HERNIA REPAIR  2016    w MICHAEL.  HX ORTHOPAEDIC Left     left 5th toe repair    HX TOTAL ABDOMINAL HYSTERECTOMY  09/2016    ovaries intact. due to thick uterus wall.   Dr. Zeeshan Hartman HX TUBAL LIGATION           Family History:   Problem Relation Age of Onset    Stroke Father         multiple    Hypertension Father     Lung Cancer Father         and THROAT CANCER    Thyroid Disease Mother 61        hypothyroid    Diabetes Mother     Hypertension Mother     High Cholesterol Mother     Heart Surgery Mother         for tachycardia. txd w Ablation    Obesity Mother         Txd w GBP    Prostate Cancer Brother     Diabetes Brother     Hypertension Brother     Sleep Apnea Brother     Obesity Brother     Heart Attack Maternal Grandmother 43    Diabetes Maternal Grandmother     High Cholesterol Maternal Grandmother     Dementia Maternal Grandmother     Other Maternal Grandfather 27        mva    Alzheimer Paternal Grandmother     Hypertension Paternal Grandfather     Diabetes Brother     Hypertension Brother     Obesity Brother     Sleep Apnea Brother        Social History     Socioeconomic History    Marital status:      Spouse name: Not on file    Number of children: Not on file    Years of education: Not on file    Highest education level: Not on file   Occupational History    Not on file   Tobacco Use    Smoking status: Current Every Day Smoker     Packs/day: 0.25     Years: 12.00     Pack years: 3.00    Smokeless tobacco: Never Used   Substance and Sexual Activity    Alcohol use: Yes     Alcohol/week: 1.0 standard drinks     Types: 1 Glasses of wine per week     Comment: socially     Drug use: No    Sexual activity: Yes     Partners: Male   Other Topics Concern    Not on file   Social History Narrative    ** Merged History Encounter **          Social Determinants of Health     Financial Resource Strain:     Difficulty of Paying Living Expenses:    Food Insecurity:     Worried About Running Out of Food in the Last Year:     Ran Out of Food in the Last Year:    Transportation Needs:     Lack of Transportation (Medical):      Lack of Transportation (Non-Medical):    Physical Activity:     Days of Exercise per Week:     Minutes of Exercise per Session:    Stress:     Feeling of Stress :    Social Connections:     Frequency of Communication with Friends and Family:     Frequency of Social Gatherings with Friends and Family:     Attends Shinto Services:  Active Member of Clubs or Organizations:     Attends Club or Organization Meetings:     Marital Status:    Intimate Partner Violence:     Fear of Current or Ex-Partner:     Emotionally Abused:     Physically Abused:     Sexually Abused: ALLERGIES: Bactrim [sulfamethoprim ds], Codeine, Pcn [penicillins], Pcn [penicillins], and Sulfur    Review of Systems   Gastrointestinal: Positive for abdominal pain. Negative for nausea and vomiting. Genitourinary: Positive for flank pain. Negative for hematuria. Musculoskeletal: Positive for back pain, gait problem and myalgias. Neurological: Positive for numbness (right leg). Negative for loss of consciousness. All other systems reviewed and are negative. Vitals:    07/17/21 2331 07/17/21 2347 07/18/21 0000 07/18/21 0030   BP: (!) 158/72 (!) 162/84 (!) 115/53    Pulse:    63   Resp:    18   SpO2:  94% 97%             Physical Exam  Vitals and nursing note reviewed. Constitutional:       Appearance: She is well-developed. She is obese. HENT:      Head: Normocephalic and atraumatic. Eyes:      General: No scleral icterus. Cardiovascular:      Rate and Rhythm: Normal rate. Pulmonary:      Effort: Pulmonary effort is normal.   Abdominal:      General: There is no distension. Musculoskeletal:      Cervical back: Normal and normal range of motion. Thoracic back: Normal.      Lumbar back: Normal.      Right hip: Normal.      Left hip: Normal.      Right knee: Normal.      Left knee: Normal.   Skin:     General: Skin is warm and dry. Findings: No erythema or rash. Neurological:      General: No focal deficit present. Mental Status: She is alert and oriented to person, place, and time. Sensory: Sensory deficit (reports numbness radiating down right leg that is chronic) present. Motor: No weakness. Psychiatric:         Mood and Affect: Mood is anxious. Affect is tearful.          Behavior: Behavior normal. MDM       Procedures        The patient presented after a fall. The patient is now resting comfortably and feels better, is alert and in no distress. The patient has a normal mental status and is neurologically intact. The history, exam, diagnostic testing (if any), and current condition do not demonstrate signs of clinically significant intra-cranial, intra-thoracic, intra-abdominal, or musculoskeletal trauma. The vital signs have been stable and patient was ambulatory at the time of discharge. The patient's condition is stable and appropriate for discharge. The patient will pursue further outpatient evaluation with the primary care physician or other designated or consulting physician as indicated in the discharge instructions.

## 2021-07-20 ENCOUNTER — TELEPHONE (OUTPATIENT)
Dept: PRIMARY CARE CLINIC | Age: 41
End: 2021-07-20

## 2021-07-20 NOTE — TELEPHONE ENCOUNTER
Pt was seen in er due to fall. Pt states she is still in pain will like to make appointment to be seen or call to advise next steps in care.

## 2021-07-20 NOTE — TELEPHONE ENCOUNTER
Spoke with patient about needing an appointment patient went to ED after falling. Patient is still having pain and wants to make sure it still isn't anything serious.  We made the appointment for next week as a CANDIDO but she said she may go back to ER

## 2021-07-28 ENCOUNTER — OFFICE VISIT (OUTPATIENT)
Dept: PRIMARY CARE CLINIC | Age: 41
End: 2021-07-28
Payer: COMMERCIAL

## 2021-07-28 ENCOUNTER — HOSPITAL ENCOUNTER (OUTPATIENT)
Dept: GENERAL RADIOLOGY | Age: 41
Discharge: HOME | End: 2021-07-28
Payer: COMMERCIAL

## 2021-07-28 VITALS
BODY MASS INDEX: 38.33 KG/M2 | RESPIRATION RATE: 18 BRPM | TEMPERATURE: 97.8 F | DIASTOLIC BLOOD PRESSURE: 81 MMHG | HEART RATE: 64 BPM | SYSTOLIC BLOOD PRESSURE: 129 MMHG | WEIGHT: 273.8 LBS | HEIGHT: 71 IN | OXYGEN SATURATION: 100 %

## 2021-07-28 DIAGNOSIS — M25.551 RIGHT HIP PAIN: ICD-10-CM
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
## 2021-07-28 DIAGNOSIS — M54.16 LUMBAR RADICULOPATHY: ICD-10-CM
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
## 2021-07-28 DIAGNOSIS — M54.16 LUMBAR RADICULOPATHY: Primary | ICD-10-CM

## 2021-07-28 PROCEDURE — 99213 OFFICE O/P EST LOW 20 MIN: CPT | Performed by: INTERNAL MEDICINE

## 2021-07-28 PROCEDURE — 73502 X-RAY EXAM HIP UNI 2-3 VIEWS: CPT

## 2021-07-28 PROCEDURE — 72100 X-RAY EXAM L-S SPINE 2/3 VWS: CPT

## 2021-07-28 RX ORDER — METHOCARBAMOL 750 MG/1
750 TABLET, FILM COATED ORAL 4 TIMES DAILY
COMMUNITY
Start: 2021-05-18

## 2021-07-28 RX ORDER — MELOXICAM 15 MG/1
TABLET ORAL
COMMUNITY
Start: 2021-07-07 | End: 2021-11-05 | Stop reason: ALTCHOICE

## 2021-07-28 RX ORDER — TIZANIDINE 4 MG/1
4 TABLET ORAL
COMMUNITY
Start: 2021-07-20 | End: 2022-09-23 | Stop reason: ALTCHOICE

## 2021-07-28 NOTE — PROGRESS NOTES
Satnam Virgen (: 1980) is a 35 y.o. female, established patient, here for evaluation of the following chief complaint(s):  ED Follow-up (post fall )     Written by Terri Mccain, as dictated by Dr. Meenu Hightower MD.      ASSESSMENT/PLAN:  Below is the assessment and plan developed based on review of pertinent history, physical exam, labs, studies, and medications. 1. Lumbar radiculopathy  Ordered an Xray of the spine to be completed. Waiting for results. -     XR SPINE LUMB 2 OR 3 V; Future    2. Right hip pain  Ordered an Xray of the R hip to be completed. Waiting for results. -     XR HIP RT W OR WO PELV 2-3 VWS; Future    3. Fall in home, subsequent encounter  Upcoming appointment with Orhopedic surgery. SUBJECTIVE/OBJECTIVE:  HPI     Patient presented to the ER on  from a fall. She was walking down the stairs and her R knee gave out, she tried to catch herself but could not and fell on her R side. She completed a CT scan of her abdomen and pelvis and results were unremarkable. She was prescribed Norco, Lidocaine patches, and Flexeril at the ER. She notes minimal relief from these medications. She went to [facility] on  and was prescribed Prednisone. Her back pain causes her to occassionally lose feeling in her legs. She had previously done PT for her back pain, but notes the manipulation completed caused her more pain so she stopped going to PT. Patient Active Problem List   Diagnosis Code    Eczema L30.9    Depression F32.9    Chronic pain of right knee M25.561, G89.29    Obesity, morbid (HCC) E66.01    Chronic left shoulder pain M25.512, G89.29    Hyperglycemia R73.9    Hyperlipidemia E78.5    IBS (irritable bowel syndrome) K58.9    S/P hysterectomy Z90.710        Current Outpatient Medications on File Prior to Visit   Medication Sig Dispense Refill    methocarbamoL (ROBAXIN) 750 mg tablet Take 750 mg by mouth four (4) times daily.       meloxicam (MOBIC) 15 mg tablet TAKE 1 TABLET BY MOUTH EVERY DAY WITH FOOD      tiZANidine (ZANAFLEX) 4 mg tablet Take 4 mg by mouth every eight (8) hours as needed.  lidocaine (Lidoderm) 5 % Apply patch to the affected area for 12 hours a day and remove for 12 hours a day. 15 Each 0    sertraline (ZOLOFT) 50 mg tablet TAKE 1 TABLET BY MOUTH EVERY DAY 90 Tablet 0    omeprazole (PRILOSEC) 40 mg capsule Take 1 Cap by mouth daily. 30 Cap 0    buPROPion XL (WELLBUTRIN XL) 150 mg tablet TAKE 1 TABLET BY MOUTH EVERY MORNING. PCP no longer at this practice - must establish with new PCP for future refills. 90 Tab 0    metFORMIN (GLUCOPHAGE) 500 mg tablet Take 2 Tabs by mouth two (2) times daily (with meals). Indications: prevention of type 2 diabetes mellitus 120 Tab 5    [DISCONTINUED] cyclobenzaprine (FLEXERIL) 10 mg tablet Take 1 Tablet by mouth three (3) times daily as needed for Muscle Spasm(s). (Patient not taking: Reported on 7/28/2021) 9 Tablet 0     No current facility-administered medications on file prior to visit. Allergies   Allergen Reactions    Bactrim [Sulfamethoprim Ds] Hives    Codeine Itching    Pcn [Penicillins] Hives    Pcn [Penicillins] Nausea and Vomiting    Sulfur Hives       Past Medical History:   Diagnosis Date    Back pain     Bilateral ovarian cysts 2016    Dr. Archana Torre    Chronic left shoulder pain     Chronic pain of left knee     Depression     GARCIA (dyspnea on exertion) 07/2020    Dr. Johny Rider.  EF 60-65%    Dysmenorrhea 2016    Dr. Vik Steven H/O: hysterectomy 2016    ovaries intact    Hyperglycemia 2017    Hyperlipidemia 2017    IBS (irritable bowel syndrome)     w diarrhea. [facility]    Iron deficiency anemia 2017    Left knee pain 12/2019    OA. Dr. Moulton Awkward    Neck pain 03/2020    DDD. Dr. Talat Montalvo    Periodic heart flutter 07/2020    symptomatic.   Dr. Johny Rider.       Past Surgical History: Procedure Laterality Date    HX COLONOSCOPY  2010, 2015    w colon polypectomy. due q 5 yrs.  HX ENDOSCOPY  2010    in [address] HERNIA REPAIR  2016    w MICHAEL.  HX ORTHOPAEDIC Left     left 5th toe repair    HX TOTAL ABDOMINAL HYSTERECTOMY  09/2016    ovaries intact. due to thick uterus wall. Dr. Hattie Robledo HX TUBAL LIGATION         Family History   Problem Relation Age of Onset    Stroke Father         multiple    Hypertension Father     Lung Cancer Father         and THROAT CANCER    Thyroid Disease Mother 61        hypothyroid    Diabetes Mother     Hypertension Mother     High Cholesterol Mother     Heart Surgery Mother         for tachycardia. txd w Ablation    Obesity Mother         Txd w GBP    Prostate Cancer Brother     Diabetes Brother     Hypertension Brother     Sleep Apnea Brother     Obesity Brother     Heart Attack Maternal Grandmother 43    Diabetes Maternal Grandmother     High Cholesterol Maternal Grandmother     Dementia Maternal Grandmother     Other Maternal Grandfather 27        mva    Alzheimer Paternal Grandmother     Hypertension Paternal Grandfather     Diabetes Brother     Hypertension Brother     Obesity Brother     Sleep Apnea Brother        Social History     Socioeconomic History    Marital status:      Spouse name: Not on file    Number of children: Not on file    Years of education: Not on file    Highest education level: Not on file   Occupational History    Not on file   Tobacco Use    Smoking status: Current Every Day Smoker     Packs/day: 0.25     Years: 12.00     Pack years: 3.00    Smokeless tobacco: Never Used   Substance and Sexual Activity    Alcohol use:  Yes     Alcohol/week: 1.0 standard drinks     Types: 1 Glasses of wine per week     Comment: socially     Drug use: No    Sexual activity: Yes     Partners: Male   Other Topics Concern    Not on file   Social History Narrative    ** Merged History Encounter ** Social Determinants of Health       No visits with results within 3 Month(s) from this visit. Latest known visit with results is:   Orders Only on 02/15/2021   Component Date Value Ref Range Status    TSH 02/15/2021 2.19  0.36 - 3.74 uIU/mL Final    Comment:   Due to TSH heterogeneity, both structurally and degree of glycosylation,  monoclonal antibodies used in the TSH assay may not accurately quantitate TSH. Therefore, this result should be correlated with clinical findings as well as  with other assessments of thyroid function, e.g., free T4, free T3.      LIPID PROFILE 02/15/2021        Final    Cholesterol, total 02/15/2021 210* <200 MG/DL Final    Triglyceride 02/15/2021 141  <150 MG/DL Final    Comment: Based on NCEP-ATP III:  Triglycerides <150 mg/dL  is considered normal, 150-199  mg/dL  borderline high,  200-499 mg/dL high and  greater than or equal to 500  mg/dL very high.  HDL Cholesterol 02/15/2021 33  MG/DL Final    Comment: Based on NCEP ATP III, HDL Cholesterol <40 mg/dL is considered low and >60  mg/dL is elevated.       LDL, calculated 02/15/2021 148.8* 0 - 100 MG/DL Final    Comment: Based on the NCEP-ATP: LDL-C concentrations are considered  optimal <100 mg/dL,  near optimal/above Normal 100-129 mg/dL Borderline High: 130-159, High: 160-189  mg/dL Very High: Greater than or equal to 190 mg/dL      VLDL, calculated 02/15/2021 28.2  MG/DL Final    CHOL/HDL Ratio 02/15/2021 6.4* 0.0 - 5.0   Final    WBC 02/15/2021 8.9  3.6 - 11.0 K/uL Final    RBC 02/15/2021 4.65  3.80 - 5.20 M/uL Final    HGB 02/15/2021 12.4  11.5 - 16.0 g/dL Final    HCT 02/15/2021 40.6  35.0 - 47.0 % Final    MCV 02/15/2021 87.3  80.0 - 99.0 FL Final    MCH 02/15/2021 26.7  26.0 - 34.0 PG Final    MCHC 02/15/2021 30.5  30.0 - 36.5 g/dL Final    RDW 02/15/2021 14.1  11.5 - 14.5 % Final    PLATELET 51/19/5951 283  150 - 400 K/uL Final    MPV 02/15/2021 11.5  8.9 - 12.9 FL Final    NRBC 02/15/2021 0.0  0  WBC Final    ABSOLUTE NRBC 02/15/2021 0.00  0.00 - 0.01 K/uL Final    Sodium 02/15/2021 137  136 - 145 mmol/L Final    Potassium 02/15/2021 4.2  3.5 - 5.1 mmol/L Final    Chloride 02/15/2021 103  97 - 108 mmol/L Final    CO2 02/15/2021 29  21 - 32 mmol/L Final    Anion gap 02/15/2021 5  5 - 15 mmol/L Final    Glucose 02/15/2021 87  65 - 100 mg/dL Final    BUN 02/15/2021 21* 6 - 20 MG/DL Final    Creatinine 02/15/2021 0.85  0.55 - 1.02 MG/DL Final    BUN/Creatinine ratio 02/15/2021 25* 12 - 20   Final    GFR est AA 02/15/2021 >60  >60 ml/min/1.73m2 Final    GFR est non-AA 02/15/2021 >60  >60 ml/min/1.73m2 Final    Comment: Estimated GFR is calculated using the IDMS-traceable Modification of Diet in  Renal Disease (MDRD) Study equation, reported for both  Americans  (GFRAA) and non- Americans (GFRNA), and normalized to 1.73m2 body  surface area. The physician must decide which value applies to the patient.  Calcium 02/15/2021 8.8  8.5 - 10.1 MG/DL Final    Bilirubin, total 02/15/2021 0.2  0.2 - 1.0 MG/DL Final    ALT (SGPT) 02/15/2021 16  12 - 78 U/L Final    AST (SGOT) 02/15/2021 10* 15 - 37 U/L Final    Alk. phosphatase 02/15/2021 84  45 - 117 U/L Final    Protein, total 02/15/2021 6.8  6.4 - 8.2 g/dL Final    Albumin 02/15/2021 3.6  3.5 - 5.0 g/dL Final    Globulin 02/15/2021 3.2  2.0 - 4.0 g/dL Final    A-G Ratio 02/15/2021 1.1  1.1 - 2.2   Final       Review of Systems   Constitutional: Negative for activity change, fatigue and unexpected weight change. HENT: Negative for congestion, hearing loss, rhinorrhea and sore throat. Eyes: Negative for discharge. Respiratory: Negative for cough, chest tightness and shortness of breath. Cardiovascular: Negative for leg swelling. Gastrointestinal: Negative for abdominal pain, constipation and diarrhea. Genitourinary: Negative for dysuria, flank pain, frequency and urgency.    Musculoskeletal: Positive for arthralgias and back pain. Negative for myalgias. Skin: Negative for color change and rash. Neurological: Positive for numbness (BL legs). Negative for dizziness, light-headedness and headaches. Psychiatric/Behavioral: Negative for dysphoric mood and sleep disturbance. The patient is not nervous/anxious. Visit Vitals  /81 (BP 1 Location: Left upper arm, BP Patient Position: Sitting)   Pulse 64   Temp 97.8 °F (36.6 °C) (Temporal)   Resp 18   Ht 5' 11\" (1.803 m)   Wt 273 lb 12.8 oz (124.2 kg)   LMP 11/24/2015 (Exact Date)   SpO2 100%   BMI 38.19 kg/m²       Physical Exam  Vitals and nursing note reviewed. Constitutional:       General: She is not in acute distress. Appearance: Normal appearance. She is well-developed. She is not diaphoretic. HENT:      Right Ear: External ear normal.      Left Ear: External ear normal.   Eyes:      General: No scleral icterus. Right eye: No discharge. Left eye: No discharge. Extraocular Movements: Extraocular movements intact. Conjunctiva/sclera: Conjunctivae normal.   Cardiovascular:      Rate and Rhythm: Normal rate and regular rhythm. Pulmonary:      Effort: Pulmonary effort is normal.      Breath sounds: Normal breath sounds. No wheezing. Abdominal:      General: Bowel sounds are normal.      Palpations: Abdomen is soft. Tenderness: There is no abdominal tenderness. Musculoskeletal:      Cervical back: Normal range of motion and neck supple. Lumbar back: Tenderness present. Lymphadenopathy:      Cervical: No cervical adenopathy. Neurological:      Mental Status: She is alert and oriented to person, place, and time. Psychiatric:         Mood and Affect: Mood and affect normal.             An electronic signature was used to authenticate this note.   -- Maribeth Goodman

## 2021-07-29 NOTE — PROGRESS NOTES
Results reviewed. Release via MyChartChristy, your lower back X-ray did not show any arthritis or herniated disc.

## 2021-07-29 NOTE — PROGRESS NOTES
[facility] reviewed. [facility] your Hip X-ray came back normal. I would recommend continuing physical therapy.

## 2021-09-29 DIAGNOSIS — F32.89 OTHER DEPRESSION: ICD-10-CM

## 2021-09-29 DIAGNOSIS — F41.9 ANXIETY: ICD-10-CM

## 2021-09-29 RX ORDER — SERTRALINE HYDROCHLORIDE 50 MG/1
TABLET, FILM COATED ORAL
Qty: 90 TABLET | Refills: 0 | Status: SHIPPED | OUTPATIENT
Start: 2021-09-29 | End: 2022-02-01

## 2021-11-05 ENCOUNTER — OFFICE VISIT (OUTPATIENT)
Dept: PRIMARY CARE CLINIC | Age: 41
End: 2021-11-05
Payer: COMMERCIAL

## 2021-11-05 VITALS
OXYGEN SATURATION: 100 % | DIASTOLIC BLOOD PRESSURE: 80 MMHG | WEIGHT: 286 LBS | BODY MASS INDEX: 40.04 KG/M2 | TEMPERATURE: 97.8 F | RESPIRATION RATE: 18 BRPM | HEIGHT: 71 IN | HEART RATE: 69 BPM | SYSTOLIC BLOOD PRESSURE: 128 MMHG

## 2021-11-05 DIAGNOSIS — Z11.59 NEED FOR HEPATITIS C SCREENING TEST: ICD-10-CM

## 2021-11-05 DIAGNOSIS — Z23 ENCOUNTER FOR IMMUNIZATION: ICD-10-CM

## 2021-11-05 DIAGNOSIS — R10.13 EPIGASTRIC PAIN: ICD-10-CM

## 2021-11-05 DIAGNOSIS — M54.50 CHRONIC RIGHT-SIDED LOW BACK PAIN WITHOUT SCIATICA: ICD-10-CM

## 2021-11-05 DIAGNOSIS — H66.90 EAR INFECTION: ICD-10-CM

## 2021-11-05 DIAGNOSIS — G89.29 CHRONIC RIGHT-SIDED LOW BACK PAIN WITHOUT SCIATICA: ICD-10-CM

## 2021-11-05 DIAGNOSIS — Z12.31 ENCOUNTER FOR SCREENING MAMMOGRAM FOR MALIGNANT NEOPLASM OF BREAST: ICD-10-CM

## 2021-11-05 DIAGNOSIS — R59.0 LYMPHADENOPATHY, CERVICAL: Primary | ICD-10-CM

## 2021-11-05 DIAGNOSIS — L30.8 OTHER ECZEMA: ICD-10-CM

## 2021-11-05 PROCEDURE — 99214 OFFICE O/P EST MOD 30 MIN: CPT | Performed by: INTERNAL MEDICINE

## 2021-11-05 PROCEDURE — 90471 IMMUNIZATION ADMIN: CPT | Performed by: INTERNAL MEDICINE

## 2021-11-05 PROCEDURE — 90686 IIV4 VACC NO PRSV 0.5 ML IM: CPT | Performed by: INTERNAL MEDICINE

## 2021-11-05 RX ORDER — OFLOXACIN 3 MG/ML
5 SOLUTION AURICULAR (OTIC) 2 TIMES DAILY
Qty: 5 ML | Refills: 0 | Status: SHIPPED | OUTPATIENT
Start: 2021-11-05 | End: 2021-11-10

## 2021-11-05 RX ORDER — MELOXICAM 15 MG/1
15 TABLET ORAL DAILY
Qty: 30 TABLET | Refills: 0 | Status: SHIPPED | OUTPATIENT
Start: 2021-11-05 | End: 2022-04-16

## 2021-11-05 RX ORDER — OMEPRAZOLE 40 MG/1
40 CAPSULE, DELAYED RELEASE ORAL DAILY
Qty: 30 CAPSULE | Refills: 0 | Status: SHIPPED | OUTPATIENT
Start: 2021-11-05 | End: 2021-11-29

## 2021-11-05 NOTE — PROGRESS NOTES
Chief Complaint   Patient presents with    Gland Swelling    Back Pain     on going issue since July. taking OTC pain meds daily    Immunization/Injection     influenza vaccine   [employer] Referral Request     dermatology      Patient provided with most updated VIS prior to administration. Opportunity given for questions and concerns provided. No concerns at this time    Immunizations administered to patient 11/5/2021 by Katerina Coles LPN with consent. Patient tolerated procedure well. No reactions noted.

## 2021-11-05 NOTE — PROGRESS NOTES
Kim Oneil (: 1980) is a 35 y.o. female, established patient, here for evaluation of the following chief complaint(s):  Gland Swelling, Back Pain (on going issue since July. taking OTC pain meds daily), Immunization/Injection (influenza vaccine), and Referral Request (dermatology )     Written by Yaniv Singh, as dictated by Dr. Porfirio Rendon MD.      ASSESSMENT/PLAN:  Below is the assessment and plan developed based on review of pertinent history, physical exam, labs, studies, and medications. 1. Lymphadenopathy, cervical  Ordered labs to check CBC levels. Waiting for results. -     CBC W/O DIFF; Future    2. Encounter for immunization  Administered an influenza vaccine in the office today. She tolerated the vaccine well. -     INFLUENZA VIRUS VAC QUAD,SPLIT,PRESV FREE SYRINGE IM    3. Chronic right-sided low back pain without sciatica  Prescribed Mobic 15 mg, take 1 tab daily as prescribed. Potential side effects were discussed. Referred to Physical Therapy. Ordered labs to check metabolic panel. Waiting for results. I am repeating metabolic panel labs due to patient taking Mobic for a long period of time. -     meloxicam (MOBIC) 15 mg tablet; Take 1 Tablet by mouth daily for 30 days. , Normal, Disp-30 Tablet, R-0 sent to pharmacy.   -     [address]; Future    4. Ear infection  Prescribed Floxin 0.3% solution, administer 5 drops in L ear BID as prescribed. Potential side effects were discussed. -     ofloxacin (FLOXIN) 0.3 % otic solution; Administer 5 Drops in left ear two (2) times a day for 5 days. , Normal, Disp-5 mL, R-0 sent to pharmacy. 5. Other eczema  Referred to Dermatology.   -     REFERRAL TO DERMATOLOGY    6. Need for hepatitis C screening test  Ordered Hepatitis C test. Waiting for results.   -     HEPATITIS C AB; Future    7.  Epigastric pain  Prescribed Prilosec 40 mg, take 1 tab daily 30 mins before eating breakfast. Potential side effects were discussed. -     omeprazole (PRILOSEC) 40 mg capsule; Take 1 Capsule by mouth daily for 30 days. , Normal, Disp-30 Capsule, R-0 sent to pharmacy. 8. Encounter for screening mammogram for malignant neoplasm of breast  Ordered a mammogram to be completed. Waiting for results. -     [facility] 3D JANAY W MAMMO BI SCREENING INCL CAD; Future    SUBJECTIVE/OBJECTIVE:  HPI     Patient presents today c/o R neck lymph node swelling that was present for 1-2 weeks but has slowly been subsiding but it is still present. She notes a slightly sore throat. Her BP today is 142/79, 128/80 manual repeat. She c/o back pain that spreads down her R leg. She fell in July and was completing Physical Therapy. She notes PT made her pain worse and she was unable to walk so she d/c PT. She was taking Mobic 15 mg for back and notes relief with the medication but she finished the prescription. Since finishing the prescription she has been taking OTC pain killers. She c/o heartburn recently. She c/o her eczema not being under control. Her last mammogram was in 2016 and results were unremarkable. She takes Zoloft 50 mg for anxiety and depression. Patient Active Problem List   Diagnosis Code    Eczema L30.9    Depression F32. A    Chronic pain of right knee M25.561, G89.29    Obesity, morbid (HCC) E66.01    Chronic left shoulder pain M25.512, G89.29    Hyperglycemia R73.9    Hyperlipidemia E78.5    IBS (irritable bowel syndrome) K58.9    S/P hysterectomy Z90.710        Current Outpatient Medications on File Prior to Visit   Medication Sig Dispense Refill    sertraline (ZOLOFT) 50 mg tablet TAKE 1 TABLET BY MOUTH EVERY DAY 90 Tablet 0    methocarbamoL (ROBAXIN) 750 mg tablet Take 750 mg by mouth four (4) times daily. (Patient not taking: Reported on 11/5/2021)      tiZANidine (ZANAFLEX) 4 mg tablet Take 4 mg by mouth every eight (8) hours as needed.  (Patient not taking: Reported on 11/5/2021)      [DISCONTINUED] meloxicam (MOBIC) 15 mg tablet TAKE 1 TABLET BY MOUTH EVERY DAY WITH FOOD (Patient not taking: Reported on 11/5/2021)      lidocaine (Lidoderm) 5 % Apply patch to the affected area for 12 hours a day and remove for 12 hours a day. (Patient not taking: Reported on 11/5/2021) 15 Each 0    [DISCONTINUED] omeprazole (PRILOSEC) 40 mg capsule Take 1 Cap by mouth daily. (Patient not taking: Reported on 11/5/2021) 30 Cap 0    buPROPion XL (WELLBUTRIN XL) 150 mg tablet TAKE 1 TABLET BY MOUTH EVERY MORNING. PCP no longer at this practice - must establish with new PCP for future refills. 90 Tab 0    metFORMIN (GLUCOPHAGE) 500 mg tablet Take 2 Tabs by mouth two (2) times daily (with meals). Indications: prevention of type 2 diabetes mellitus (Patient not taking: Reported on 11/5/2021) 120 Tab 5     No current facility-administered medications on file prior to visit. Allergies   Allergen Reactions    Bactrim [Sulfamethoprim Ds] Hives    Codeine Itching    Pcn [Penicillins] Hives    Pcn [Penicillins] Nausea and Vomiting    Sulfur Hives       Past Medical History:   Diagnosis Date    Back pain     Bilateral ovarian cysts 2016    Dr. Leim Kawasaki    Chronic left shoulder pain     Chronic pain of left knee     Depression     GARCIA (dyspnea on exertion) 07/2020    Dr. Ravindra Lr.  EF 60-65%    Dysmenorrhea 2016    Dr. Leila Robbins H/O: hysterectomy 2016    ovaries intact    Hyperglycemia 2017    Hyperlipidemia 2017    IBS (irritable bowel syndrome)     w diarrhea. [facility]    Iron deficiency anemia 2017    Left knee pain 12/2019    OA. Dr. Viktor Roque    Neck pain 03/2020    DDD. Dr. Alisha Bruno    Periodic heart flutter 07/2020    symptomatic. Dr. Ravindra Lr.       Past Surgical History:   Procedure Laterality Date    HX COLONOSCOPY  2010, 2015    w colon polypectomy. due q 5 yrs.  HX ENDOSCOPY  2010    in [place].     HX HERNIA REPAIR  2016    w MICHAEL.  HX ORTHOPAEDIC Left     left 5th toe repair    HX TOTAL ABDOMINAL HYSTERECTOMY  09/2016    ovaries intact. due to thick uterus wall. Dr. Merrick León HX TUBAL LIGATION         Family History   Problem Relation Age of Onset    Stroke Father         multiple    Hypertension Father     Lung Cancer Father         and THROAT CANCER    Thyroid Disease Mother 61        hypothyroid    Diabetes Mother     Hypertension Mother     High Cholesterol Mother     Heart Surgery Mother         for tachycardia. txd w Ablation    Obesity Mother         Txd w GBP    Prostate Cancer Brother     Diabetes Brother     Hypertension Brother     Sleep Apnea Brother     Obesity Brother     Heart Attack Maternal Grandmother 43    Diabetes Maternal Grandmother     High Cholesterol Maternal Grandmother     Dementia Maternal Grandmother     Other Maternal Grandfather 27        mva    Alzheimer's Disease Paternal Grandmother     Hypertension Paternal Grandfather     Diabetes Brother     Hypertension Brother     Obesity Brother     Sleep Apnea Brother        Social History     Socioeconomic History    Marital status:      Spouse name: Not on file    Number of children: Not on file    Years of education: Not on file    Highest education level: Not on file   Occupational History    Not on file   Tobacco Use    Smoking status: Current Every Day Smoker     Packs/day: 0.25     Years: 12.00     Pack years: 3.00    Smokeless tobacco: Never Used   Substance and Sexual Activity    Alcohol use:  Yes     Alcohol/week: 1.0 standard drink     Types: 1 Glasses of wine per week     Comment: socially     Drug use: No    Sexual activity: Yes     Partners: Male   Other Topics Concern    Not on file   Social History Narrative    ** Merged History Encounter **          Social Determinants of Health     Financial Resource Strain:     Difficulty of Paying Living Expenses: Not on file   Food Insecurity:     Worried About Running Out of Food in the Last Year: Not on file    Fran of Food in the Last Year: Not on file   Transportation Needs:     Lack of Transportation (Medical): Not on file    Lack of Transportation (Non-Medical): Not on file   Physical Activity:     Days of Exercise per Week: Not on file    Minutes of Exercise per Session: Not on file   Stress:     Feeling of Stress : Not on file   Social Connections:     Frequency of Communication with Friends and Family: Not on file    Frequency of Social Gatherings with Friends and Family: Not on file    Attends Shinto Services: Not on file    Active Member of [address] [place] [address] [employer] or Organizations: Not on file    Attends Club or Organization Meetings: Not on file    Marital Status: Not on file   Intimate Partner Violence:     Fear of Current or Ex-Partner: Not on file    Emotionally Abused: Not on file    Physically Abused: Not on file    Sexually Abused: Not on file   Housing Stability:     Unable to Pay for Housing in the Last Year: Not on file    Number of [place] in the Last Year: Not on file    Unstable Housing in the Last Year: Not on file       No visits with results within 3 Month(s) from this visit. Latest known visit with results is:   Orders Only on 02/15/2021   Component Date Value Ref Range Status    TSH 02/15/2021 2.19  0.36 - 3.74 uIU/mL Final    Comment:   Due to TSH heterogeneity, both structurally and degree of glycosylation,  monoclonal antibodies used in the TSH assay may not accurately quantitate TSH.   Therefore, this result should be correlated with clinical findings as well as  with other assessments of thyroid function, e.g., free T4, free T3.      LIPID PROFILE 02/15/2021        Final    Cholesterol, total 02/15/2021 210* <200 MG/DL Final    Triglyceride 02/15/2021 141  <150 MG/DL Final    Comment: Based on NCEP-ATP III:  Triglycerides <150 mg/dL  is considered normal, 150-199  mg/dL  borderline high, 200-499 mg/dL high and  greater than or equal to 500  mg/dL very high.  HDL Cholesterol 02/15/2021 33  MG/DL Final    Comment: Based on NCEP ATP III, HDL Cholesterol <40 mg/dL is considered low and >60  mg/dL is elevated.       LDL, calculated 02/15/2021 148.8* 0 - 100 MG/DL Final    Comment: Based on the NCEP-ATP: LDL-C concentrations are considered  optimal <100 mg/dL,  near optimal/above Normal 100-129 mg/dL Borderline High: 130-159, High: 160-189  mg/dL Very High: Greater than or equal to 190 mg/dL      VLDL, calculated 02/15/2021 28.2  MG/DL Final    CHOL/HDL Ratio 02/15/2021 6.4* 0.0 - 5.0   Final    WBC 02/15/2021 8.9  3.6 - 11.0 K/uL Final    RBC 02/15/2021 4.65  3.80 - 5.20 M/uL Final    HGB 02/15/2021 12.4  11.5 - 16.0 g/dL Final    HCT 02/15/2021 40.6  35.0 - 47.0 % Final    MCV 02/15/2021 87.3  80.0 - 99.0 FL Final    MCH 02/15/2021 26.7  26.0 - 34.0 PG Final    MCHC 02/15/2021 30.5  30.0 - 36.5 g/dL Final    RDW 02/15/2021 14.1  11.5 - 14.5 % Final    PLATELET 36/21/9262 298  150 - 400 K/uL Final    MPV 02/15/2021 11.5  8.9 - 12.9 FL Final    NRBC 02/15/2021 0.0  0  WBC Final    ABSOLUTE NRBC 02/15/2021 0.00  0.00 - 0.01 K/uL Final    Sodium 02/15/2021 137  136 - 145 mmol/L Final    Potassium 02/15/2021 4.2  3.5 - 5.1 mmol/L Final    Chloride 02/15/2021 103  97 - 108 mmol/L Final    CO2 02/15/2021 29  21 - 32 mmol/L Final    Anion gap 02/15/2021 5  5 - 15 mmol/L Final    Glucose 02/15/2021 87  65 - 100 mg/dL Final    BUN 02/15/2021 21* 6 - 20 MG/DL Final    Creatinine 02/15/2021 0.85  0.55 - 1.02 MG/DL Final    BUN/Creatinine ratio 02/15/2021 25* 12 - 20   Final    GFR est AA 02/15/2021 >60  >60 ml/min/1.73m2 Final    GFR est non-AA 02/15/2021 >60  >60 ml/min/1.73m2 Final    Comment: Estimated GFR is calculated using the IDMS-traceable Modification of Diet in  Renal Disease (MDRD) Study equation, reported for both  Americans  (GFRAA) and non- Americans [facility]), and normalized to 1.73m2 body  surface area. The physician must decide which value applies to the patient.  Calcium 02/15/2021 8.8  8.5 - 10.1 MG/DL Final    Bilirubin, total 02/15/2021 0.2  0.2 - 1.0 MG/DL Final    ALT (SGPT) 02/15/2021 16  12 - 78 U/L Final    AST (SGOT) 02/15/2021 10* 15 - 37 U/L Final    Alk. phosphatase 02/15/2021 84  45 - 117 U/L Final    Protein, total 02/15/2021 6.8  6.4 - 8.2 g/dL Final    Albumin 02/15/2021 3.6  3.5 - 5.0 g/dL Final    Globulin 02/15/2021 3.2  2.0 - 4.0 g/dL Final    A-G Ratio 02/15/2021 1.1  1.1 - 2.2   Final       Review of Systems   Constitutional: Negative for activity change, fatigue and unexpected weight change. HENT: Positive for sore throat. Negative for congestion, hearing loss and rhinorrhea. Swollen lymph node on R side of neck   Eyes: Negative for discharge. Respiratory: Negative for cough, chest tightness and shortness of breath. Cardiovascular: Negative for leg swelling. Gastrointestinal: Negative for abdominal pain, constipation and diarrhea. Genitourinary: Negative for dysuria, flank pain, frequency and urgency. Musculoskeletal: Positive for back pain. Negative for arthralgias and myalgias. Skin: Positive for rash. Negative for color change. Neurological: Negative for dizziness, light-headedness and headaches. Psychiatric/Behavioral: Negative for dysphoric mood and sleep disturbance. The patient is not nervous/anxious. Visit Vitals  /80 (BP 1 Location: Left upper arm)   Pulse 69   Temp 97.8 °F (36.6 °C) (Temporal)   Resp 18   Ht 5' 11\" (1.803 m)   Wt 286 lb (129.7 kg)   LMP 11/24/2015 (Exact Date)   SpO2 100%   BMI 39.89 kg/m²      Physical Exam  Vitals and nursing note reviewed. Constitutional:       General: She is not in acute distress. Appearance: Normal appearance. She is well-developed. She is not diaphoretic.    HENT:      Right Ear: External ear normal. Tympanic membrane is erythematous. Left Ear: External ear normal. Tympanic membrane is erythematous. Eyes:      General: No scleral icterus. Right eye: No discharge. Left eye: No discharge. Extraocular Movements: Extraocular movements intact. Conjunctiva/sclera: Conjunctivae normal.   Cardiovascular:      Rate and Rhythm: Normal rate and regular rhythm. Pulmonary:      Effort: Pulmonary effort is normal.      Breath sounds: Normal breath sounds. No wheezing. Abdominal:      General: Bowel sounds are normal.      Palpations: Abdomen is soft. Tenderness: There is no abdominal tenderness. Musculoskeletal:      Cervical back: Normal range of motion and neck supple. Lymphadenopathy:      Cervical: Cervical adenopathy (R side) present. Skin:     Findings: Rash (eczema) present. Neurological:      Mental Status: She is alert and oriented to person, place, and time. Psychiatric:         Mood and Affect: Mood and affect normal.             An electronic signature was used to authenticate this note.   -- Anastacia Mcarthur

## 2021-11-29 DIAGNOSIS — R10.13 EPIGASTRIC PAIN: ICD-10-CM

## 2021-11-29 RX ORDER — OMEPRAZOLE 40 MG/1
CAPSULE, DELAYED RELEASE ORAL
Qty: 30 CAPSULE | Refills: 0 | Status: SHIPPED | OUTPATIENT
Start: 2021-11-29 | End: 2021-12-15 | Stop reason: SDUPTHER

## 2021-12-14 NOTE — TELEPHONE ENCOUNTER
Chlorhexidine scrub given with instruction sheet.  Instruction reviewed in PAT, understanding verbalized.   Verified patient with two types of identifiers. Scheduled for VV today to discuss.

## 2021-12-15 DIAGNOSIS — R10.13 EPIGASTRIC PAIN: ICD-10-CM

## 2021-12-15 RX ORDER — OMEPRAZOLE 40 MG/1
40 CAPSULE, DELAYED RELEASE ORAL DAILY
Qty: 30 CAPSULE | Refills: 0 | Status: SHIPPED | OUTPATIENT
Start: 2021-12-15 | End: 2022-09-23 | Stop reason: ALTCHOICE

## 2021-12-15 NOTE — TELEPHONE ENCOUNTER
Requested Prescriptions     Pending Prescriptions Disp Refills    omeprazole (PRILOSEC) 40 mg capsule 30 Capsule 0     Sig: Take 1 Capsule by mouth daily.         Last Visit 11/5/21  Last Refill 11/29/21

## 2021-12-30 DIAGNOSIS — L03.012 CELLULITIS OF FINGER OF LEFT HAND: Primary | ICD-10-CM

## 2021-12-30 RX ORDER — DOXYCYCLINE 100 MG/1
100 CAPSULE ORAL 2 TIMES DAILY
Qty: 20 CAPSULE | Refills: 0 | Status: SHIPPED | OUTPATIENT
Start: 2021-12-30 | End: 2022-01-09

## 2022-02-01 ENCOUNTER — HOSPITAL ENCOUNTER (OUTPATIENT)
Dept: MAMMOGRAPHY | Age: 42
Discharge: HOME | End: 2022-02-01
Attending: INTERNAL MEDICINE
Payer: COMMERCIAL

## 2022-02-01 DIAGNOSIS — F41.9 ANXIETY: ICD-10-CM

## 2022-02-01 DIAGNOSIS — F32.89 OTHER DEPRESSION: ICD-10-CM

## 2022-02-01 DIAGNOSIS — Z12.31 ENCOUNTER FOR SCREENING MAMMOGRAM FOR MALIGNANT NEOPLASM OF BREAST: ICD-10-CM

## 2022-02-01 PROCEDURE — 77063 BREAST TOMOSYNTHESIS BI: CPT

## 2022-02-01 RX ORDER — SERTRALINE HYDROCHLORIDE 50 MG/1
TABLET, FILM COATED ORAL
Qty: 90 TABLET | Refills: 0 | Status: SHIPPED | OUTPATIENT
Start: 2022-02-01 | End: 2022-03-18 | Stop reason: SDUPTHER

## 2022-02-08 LAB
ALBUMIN SERPL-MCNC: 4.1 G/DL (ref 3.8–4.8)
ALBUMIN/GLOB SERPL: 1.8 {RATIO} (ref 1.2–2.2)
ALP SERPL-CCNC: 81 IU/L (ref 44–121)
ALT SERPL-CCNC: 8 IU/L (ref 0–32)
AST SERPL-CCNC: 8 IU/L (ref 0–40)
BILIRUB SERPL-MCNC: <0.2 MG/DL (ref 0–1.2)
BUN SERPL-MCNC: 12 MG/DL (ref 6–24)
BUN/CREAT SERPL: 14 (ref 9–23)
CALCIUM SERPL-MCNC: 8.5 MG/DL (ref 8.7–10.2)
CHLORIDE SERPL-SCNC: 104 MMOL/L (ref 96–106)
CO2 SERPL-SCNC: 25 MMOL/L (ref 20–29)
CREAT SERPL-MCNC: 0.84 MG/DL (ref 0.57–1)
ERYTHROCYTE [DISTWIDTH] IN BLOOD BY AUTOMATED COUNT: 12.8 % (ref 11.7–15.4)
GLOBULIN SER CALC-MCNC: 2.3 G/DL (ref 1.5–4.5)
GLUCOSE SERPL-MCNC: 92 MG/DL (ref 65–99)
HCT VFR BLD AUTO: 39.7 % (ref 34–46.6)
HCV AB S/CO SERPL IA: <0.1 S/CO RATIO (ref 0–0.9)
HGB BLD-MCNC: 12.9 G/DL (ref 11.1–15.9)
MCH RBC QN AUTO: 27.6 PG (ref 26.6–33)
MCHC RBC AUTO-ENTMCNC: 32.5 G/DL (ref 31.5–35.7)
MCV RBC AUTO: 85 FL (ref 79–97)
PLATELET # BLD AUTO: 299 X10E3/UL (ref 150–450)
POTASSIUM SERPL-SCNC: 4.2 MMOL/L (ref 3.5–5.2)
PROT SERPL-MCNC: 6.4 G/DL (ref 6–8.5)
RBC # BLD AUTO: 4.67 X10E6/UL (ref 3.77–5.28)
SODIUM SERPL-SCNC: 143 MMOL/L (ref 134–144)
WBC # BLD AUTO: 7.8 X10E3/UL (ref 3.4–10.8)

## 2022-02-11 NOTE — PROGRESS NOTES
Results reviewed. Release via [employer], hope all is well. Your blood report is back and I am happy to see the numbers. Kidney and liver numbers look fine.

## 2022-03-18 DIAGNOSIS — F41.9 ANXIETY: ICD-10-CM

## 2022-03-18 DIAGNOSIS — F32.89 OTHER DEPRESSION: ICD-10-CM

## 2022-03-18 PROBLEM — L30.9 ECZEMA: Status: ACTIVE | Noted: 2017-10-26

## 2022-03-18 RX ORDER — SERTRALINE HYDROCHLORIDE 50 MG/1
50 TABLET, FILM COATED ORAL DAILY
Qty: 90 TABLET | Refills: 0 | Status: SHIPPED | OUTPATIENT
Start: 2022-03-18 | End: 2022-10-05

## 2022-03-19 PROBLEM — Z90.710 S/P HYSTERECTOMY: Status: ACTIVE | Noted: 2021-02-05

## 2022-03-19 PROBLEM — G89.29 CHRONIC PAIN OF RIGHT KNEE: Status: ACTIVE | Noted: 2017-12-07

## 2022-03-19 PROBLEM — E66.01 OBESITY, MORBID (HCC): Status: ACTIVE | Noted: 2017-12-12

## 2022-03-19 PROBLEM — F32.A DEPRESSION: Status: ACTIVE | Noted: 2017-10-26

## 2022-03-19 PROBLEM — M25.561 CHRONIC PAIN OF RIGHT KNEE: Status: ACTIVE | Noted: 2017-12-07

## 2022-03-20 PROBLEM — E78.5 HYPERLIPIDEMIA: Status: ACTIVE | Noted: 2018-01-01

## 2022-04-16 DIAGNOSIS — M54.50 CHRONIC RIGHT-SIDED LOW BACK PAIN WITHOUT SCIATICA: ICD-10-CM

## 2022-04-16 DIAGNOSIS — G89.29 CHRONIC RIGHT-SIDED LOW BACK PAIN WITHOUT SCIATICA: ICD-10-CM

## 2022-04-16 RX ORDER — MELOXICAM 15 MG/1
TABLET ORAL
Qty: 30 TABLET | Refills: 0 | Status: SHIPPED | OUTPATIENT
Start: 2022-04-16 | End: 2022-06-18

## 2022-06-18 DIAGNOSIS — G89.29 CHRONIC RIGHT-SIDED LOW BACK PAIN WITHOUT SCIATICA: ICD-10-CM

## 2022-06-18 DIAGNOSIS — M54.50 CHRONIC RIGHT-SIDED LOW BACK PAIN WITHOUT SCIATICA: ICD-10-CM

## 2022-06-18 RX ORDER — MELOXICAM 15 MG/1
TABLET ORAL
Qty: 30 TABLET | Refills: 0 | Status: SHIPPED | OUTPATIENT
Start: 2022-06-18 | End: 2022-07-26

## 2022-07-26 DIAGNOSIS — M54.50 CHRONIC RIGHT-SIDED LOW BACK PAIN WITHOUT SCIATICA: ICD-10-CM

## 2022-07-26 DIAGNOSIS — G89.29 CHRONIC RIGHT-SIDED LOW BACK PAIN WITHOUT SCIATICA: ICD-10-CM

## 2022-07-26 RX ORDER — MELOXICAM 15 MG/1
TABLET ORAL
Qty: 30 TABLET | Refills: 0 | Status: SHIPPED | OUTPATIENT
Start: 2022-07-26 | End: 2022-08-22

## 2022-08-22 DIAGNOSIS — M54.50 CHRONIC RIGHT-SIDED LOW BACK PAIN WITHOUT SCIATICA: ICD-10-CM

## 2022-08-22 DIAGNOSIS — G89.29 CHRONIC RIGHT-SIDED LOW BACK PAIN WITHOUT SCIATICA: ICD-10-CM

## 2022-08-22 RX ORDER — MELOXICAM 15 MG/1
TABLET ORAL
Qty: 30 TABLET | Refills: 0 | Status: SHIPPED | OUTPATIENT
Start: 2022-08-22 | End: 2022-09-23 | Stop reason: ALTCHOICE

## 2022-09-23 ENCOUNTER — OFFICE VISIT (OUTPATIENT)
Dept: PRIMARY CARE CLINIC | Age: 42
End: 2022-09-23
Payer: COMMERCIAL

## 2022-09-23 VITALS
RESPIRATION RATE: 20 BRPM | HEART RATE: 70 BPM | HEIGHT: 71 IN | DIASTOLIC BLOOD PRESSURE: 78 MMHG | SYSTOLIC BLOOD PRESSURE: 122 MMHG | OXYGEN SATURATION: 99 % | BODY MASS INDEX: 40.65 KG/M2 | WEIGHT: 290.4 LBS | TEMPERATURE: 97 F

## 2022-09-23 DIAGNOSIS — F41.9 ANXIETY: ICD-10-CM

## 2022-09-23 DIAGNOSIS — R51.9 FREQUENT HEADACHES: ICD-10-CM

## 2022-09-23 DIAGNOSIS — M54.50 CHRONIC MIDLINE LOW BACK PAIN WITHOUT SCIATICA: Primary | ICD-10-CM

## 2022-09-23 DIAGNOSIS — E66.01 OBESITY, MORBID (HCC): ICD-10-CM

## 2022-09-23 DIAGNOSIS — G89.29 CHRONIC MIDLINE LOW BACK PAIN WITHOUT SCIATICA: Primary | ICD-10-CM

## 2022-09-23 DIAGNOSIS — Z23 NEEDS FLU SHOT: ICD-10-CM

## 2022-09-23 DIAGNOSIS — Z72.0 TOBACCO ABUSE: ICD-10-CM

## 2022-09-23 PROBLEM — F32.A DEPRESSION: Status: RESOLVED | Noted: 2017-10-26 | Resolved: 2022-09-23

## 2022-09-23 PROBLEM — L30.9 ECZEMA: Status: RESOLVED | Noted: 2017-10-26 | Resolved: 2022-09-23

## 2022-09-23 PROCEDURE — 99213 OFFICE O/P EST LOW 20 MIN: CPT | Performed by: INTERNAL MEDICINE

## 2022-09-23 PROCEDURE — 90686 IIV4 VACC NO PRSV 0.5 ML IM: CPT | Performed by: INTERNAL MEDICINE

## 2022-09-23 RX ORDER — IBUPROFEN 200 MG
1 TABLET ORAL EVERY 24 HOURS
Qty: 30 PATCH | Refills: 0 | Status: SHIPPED | OUTPATIENT
Start: 2022-09-23 | End: 2022-10-23

## 2022-09-23 RX ORDER — DICLOFENAC SODIUM 75 MG/1
TABLET, DELAYED RELEASE ORAL
COMMUNITY
Start: 2022-09-19

## 2022-09-23 NOTE — PROGRESS NOTES
1. \"Have you been to the ER, urgent care clinic since your last visit? Hospitalized since your last visit? \" No    2. \"Have you seen or consulted any other health care providers outside of the [address] [place] [address] since your last visit? \" Yes When: Pain management       3. For patients aged 39-70: Has the patient had a colonoscopy / FIT/ Cologuard? NA - based on age      If the patient is female:    4. For patients aged 41-77: Has the patient had a mammogram within the past 2 years? Yes - no Care Gap present 2021      5. For patients aged 21-65: Has the patient had a pap smear? NA - based on age or sex       Chief Complaint   Patient presents with    Elevated Blood Pressure     Has had a headache for past week.  High BP since mid Aug.

## 2022-09-23 NOTE — PROGRESS NOTES
Viry Francis (: 1980) is a 38 y.o. female, established patient, here for evaluation of the following chief complaint(s):  Elevated Blood Pressure (Has had a headache for past week. High BP since mid Aug.)     Harpreet Gómez MD, personally performed the services described in this documentation as scribed by Josiah Dotson in my presence, and it is both accurate and complete. ASSESSMENT/PLAN:  Below is the assessment and plan developed based on review of pertinent history, physical exam, labs, studies, and medications. 1. Chronic midline low back pain without sciatica  Followed by Dr. Trace Bailey (Ortho Surgery). Continue on injections. 2. Frequent headaches   Told her most likely related to elevated readings. 3. Obesity, morbid ([address]  Referred her to weight loss clinic. 4. Tobacco abuse  I rx'd nicotine 21 mg patch. Potential side effects were discussed. -     nicotine (NICODERM CQ) 21 mg/24 hr; 1 Patch by TransDERmal route every twenty-four (24) hours for 30 days. , Normal, Disp-30 Patch, R-0 sent to pharmacy. 5. Anxiety  Continue on sertraline 50 mg daily. 6. Needs flu shot  Ordered influenza vaccine to be administered in office today. She tolerated this well. -     INFLUENZA, FLUARIX, FLULAVAL, FLUZONE (AGE 6 MO+), AFLURIA(AGE 3Y+) IM, PF, 0.5 ML    SUBJECTIVE/OBJECTIVE:  HPI    The patient comes in today for a follow-up and is c/o elevated BP. She is followed by Dr. Trace Bailey (Ortho Surgery) for back pain and degenerative disc disease since . She received an MRI on 08/10/22 which showed a bulging disc and stenosis. She has been receiving injections which is helping with pain. Her BP is good today at 122/78 and her back pain has been improving. She has been experiencing elevated BP since mid-. At her pain management appt recently, she notes BP at 160/102. She has a BP monitor at home and notes HA during elevated BP.  HA and elevated BP is associated with flushing hot face and elevated back pain. She has been taking diclofenac EC 75 mg BID for back pain which has been helping since 09/19/22. She has gained 4 lb since 11/21 to 290 lb today. She is interested in joining the weight loss program. She is walking 7k steps daily for exercise. She is on sertraline 50 mg daily for anxiety. She is unsure whether it has been well-controlled as there are currently family issues. She is smoking 9-10 cigarettes daily and has been trying to quit. She has tried patches 10 mg before which did not work. Patient Active Problem List   Diagnosis Code    Chronic pain of right knee M25.561, G89.29    Obesity, morbid (HCC) E66.01    Chronic left shoulder pain M25.512, G89.29    Hyperglycemia R73.9    Hyperlipidemia E78.5    IBS (irritable bowel syndrome) K58.9    S/P hysterectomy Z90.710    Anxiety F41.9        Current Outpatient Medications on File Prior to Visit   Medication Sig Dispense Refill    diclofenac EC (VOLTAREN) 75 mg EC tablet TAKE 1 TABLET TWICE A DAY BY ORAL ROUTE WITH MEALS FOR 21 DAYS. sertraline (ZOLOFT) 50 mg tablet Take 1 Tablet by mouth daily. 90 Tablet 0    methocarbamoL (ROBAXIN) 750 mg tablet Take 750 mg by mouth four (4) times daily. [DISCONTINUED] meloxicam (MOBIC) 15 mg tablet TAKE 1 TABLET BY MOUTH EVERY DAY (Patient not taking: Reported on 9/23/2022) 30 Tablet 0    [DISCONTINUED] omeprazole (PRILOSEC) 40 mg capsule Take 1 Capsule by mouth daily. (Patient not taking: Reported on 9/23/2022) 30 Capsule 0    [DISCONTINUED] tiZANidine (ZANAFLEX) 4 mg tablet Take 4 mg by mouth every eight (8) hours as needed. (Patient not taking: No sig reported)      [DISCONTINUED] lidocaine (Lidoderm) 5 % Apply patch to the affected area for 12 hours a day and remove for 12 hours a day. (Patient not taking: Reported on 11/5/2021) 15 Each 0    [DISCONTINUED] buPROPion XL (WELLBUTRIN XL) 150 mg tablet TAKE 1 TABLET BY MOUTH EVERY MORNING.  PCP no longer at this practice - must establish with new PCP for future refills. 90 Tab 0    metFORMIN (GLUCOPHAGE) 500 mg tablet Take 2 Tabs by mouth two (2) times daily (with meals). Indications: prevention of type 2 diabetes mellitus (Patient not taking: No sig reported) 120 Tab 5     No current facility-administered medications on file prior to visit. Allergies   Allergen Reactions    Bactrim [Sulfamethoprim Ds] Hives    Codeine Itching    Pcn [Penicillins] Hives    Pcn [Penicillins] Nausea and Vomiting    Sulfur Hives       Past Medical History:   Diagnosis Date    Back pain     Bilateral ovarian cysts 2016    Dr. Rolly Choe    Chronic left shoulder pain     Chronic pain of left knee     Depression     GARCIA (dyspnea on exertion) 07/2020    Dr. Azucena Mustafa.  EF 60-65%    Dysmenorrhea 2016    Dr. Sid Arana     H/O: hysterectomy 2016    ovaries intact    Hyperglycemia 2017    Hyperlipidemia 2017    IBS (irritable bowel syndrome)     w diarrhea. [facility]    Iron deficiency anemia 2017    Left knee pain 12/2019    OA. Dr. Manfred Guardado    Neck pain 03/2020    DDD. Dr. Mikaela Chilel    Periodic heart flutter 07/2020    symptomatic. Dr. Azucena Mustafa.       Past Surgical History:   Procedure Laterality Date    HX COLONOSCOPY  2010, 2015    w colon polypectomy. due q 5 yrs. HX ENDOSCOPY  2010    in [facility]. HX HERNIA REPAIR  2016    w MICHAEL. HX ORTHOPAEDIC Left     left 5th toe repair    HX TOTAL ABDOMINAL HYSTERECTOMY  09/2016    ovaries intact. due to thick uterus wall. Dr. Aquiles Monroy TUBAL LIGATION         Family History   Problem Relation Age of Onset    Stroke Father         multiple    Hypertension Father     Lung Cancer Father         and THROAT CANCER    Thyroid Disease Mother 61        hypothyroid    Diabetes Mother     Hypertension Mother     High Cholesterol Mother     Heart Surgery Mother         for tachycardia.   txd w Ablation    Obesity Mother         Txd w GBP    Prostate Cancer Brother Diabetes Brother     Hypertension Brother     Sleep Apnea Brother     Obesity Brother     Heart Attack Maternal Grandmother 42    Diabetes Maternal Grandmother     High Cholesterol Maternal Grandmother     Dementia Maternal Grandmother     Other Maternal Grandfather 27        mva    Alzheimer's Disease Paternal Grandmother     Hypertension Paternal Grandfather     Diabetes Brother     Hypertension Brother     Obesity Brother     Sleep Apnea Brother        Social History     Socioeconomic History    Marital status:      Spouse name: Not on file    Number of children: Not on file    Years of education: Not on file    Highest education level: Not on file   Occupational History    Not on file   Tobacco Use    Smoking status: Every Day     Packs/day: 0.25     Years: 12.00     Pack years: 3.00     Types: Cigarettes    Smokeless tobacco: Never   Substance and Sexual Activity    Alcohol use: Yes     Alcohol/week: 1.0 standard drink     Types: 1 Glasses of wine per week     Comment: socially     Drug use: No    Sexual activity: Yes     Partners: Male   Other Topics Concern    Not on file   Social History Narrative    ** Merged History Encounter **          No visits with results within 3 Month(s) from this visit. Latest known visit with results is:   Office Visit on 11/05/2021   Component Date Value Ref Range Status    Glucose 02/07/2022 92  65 - 99 mg/dL Final    BUN 02/07/2022 12  6 - 24 mg/dL Final    Creatinine 02/07/2022 0.84  0.57 - 1.00 mg/dL Final    GFR est non-AA 02/07/2022 85  >59 mL/min/1.73 Final    GFR est AA 02/07/2022 98  >59 mL/min/1.73 Final    Comment: **In accordance with recommendations from the NKF-ASN Task force,**    [facility] is in the process [facility] updating its eGFR calculation to the    2021 CKD-EPI creatinine equation that estimates kidney function    without a race variable.       BUN/Creatinine ratio 02/07/2022 14  9 - 23 Final    Sodium 02/07/2022 143  134 - 144 mmol/L Final    Potassium 02/07/2022 4.2  3.5 - 5.2 mmol/L Final    Chloride 02/07/2022 104  96 - 106 mmol/L Final    CO2 02/07/2022 25  20 - 29 mmol/L Final    Calcium 02/07/2022 8.5 (A)  8.7 - 10.2 mg/dL Final    Protein, total 02/07/2022 6.4  6.0 - 8.5 g/dL Final    Albumin 02/07/2022 4.1  3.8 - 4.8 g/dL Final    GLOBULIN, TOTAL 02/07/2022 2.3  1.5 - 4.5 g/dL Final    A-G Ratio 02/07/2022 1.8  1.2 - 2.2 Final    Bilirubin, total 02/07/2022 <0.2  0.0 - 1.2 mg/dL Final    Alk. phosphatase 02/07/2022 81  44 - 121 IU/L Final    AST (SGOT) 02/07/2022 8  0 - 40 IU/L Final    ALT (SGPT) 02/07/2022 8  0 - 32 IU/L Final    WBC 02/07/2022 7.8  3.4 - 10.8 x10E3/uL Final    RBC 02/07/2022 4.67  3.77 - 5.28 x10E6/uL Final    HGB 02/07/2022 12.9  11.1 - 15.9 g/dL Final    HCT 02/07/2022 39.7  34.0 - 46.6 % Final    MCV 02/07/2022 85  79 - 97 fL Final    MCH 02/07/2022 27.6  26.6 - 33.0 pg Final    MCHC 02/07/2022 32.5  31.5 - 35.7 g/dL Final    RDW 02/07/2022 12.8  11.7 - 15.4 % Final    PLATELET 00/91/0592 596  150 - 450 x10E3/uL Final    Hep C Virus Ab 02/07/2022 <0.1  0.0 - 0.9 s/co ratio Final    Comment:                                   Negative:     < 0.8                               Indeterminate: 0.8 - 0.9                                    Positive:     > 0.9   The CDC recommends that a positive HCV antibody result   be followed up with a HCV Nucleic Acid Amplification   test (393107). Review of Systems   Constitutional:  Negative for activity change, fatigue and unexpected weight change. HENT:  Negative for congestion, hearing loss, rhinorrhea and sore throat. Eyes:  Negative for discharge. Respiratory:  Negative for cough, chest tightness and shortness of breath. Cardiovascular:  Negative for leg swelling. Gastrointestinal:  Negative for abdominal pain, constipation and diarrhea. Genitourinary:  Negative for dysuria, flank pain, frequency and urgency.    Musculoskeletal:  Negative for arthralgias, back pain and myalgias. Skin:  Negative for color change and rash. Neurological:  Positive for headaches. Negative for dizziness. Psychiatric/Behavioral:  Negative for dysphoric mood and sleep disturbance. The patient is not nervous/anxious. Visit Vitals  /78 (BP 1 Location: Left upper arm, BP Patient Position: Sitting)   Pulse 70   Temp 97 °F (36.1 °C) (Temporal)   Resp 20   Ht 5' 11\" (1.803 m)   Wt 290 lb 6.4 oz (131.7 kg)   LMP 11/24/2015 (Exact Date)   SpO2 99%   BMI 40.50 kg/m²      Physical Exam  Vitals and nursing note reviewed. Constitutional:       General: She is not in acute distress. Appearance: Normal appearance. She is well-developed. She is not diaphoretic. HENT:      Right Ear: External ear normal.      Left Ear: External ear normal.   Eyes:      General: No scleral icterus. Right eye: No discharge. Left eye: No discharge. Extraocular Movements: Extraocular movements intact. Conjunctiva/sclera: Conjunctivae normal.   Cardiovascular:      Rate and Rhythm: Normal rate and regular rhythm. Pulmonary:      Effort: Pulmonary effort is normal.      Breath sounds: Normal breath sounds. No wheezing. Abdominal:      General: Bowel sounds are normal.      Palpations: Abdomen is soft. Tenderness: There is no abdominal tenderness. Musculoskeletal:      Cervical back: Normal range of motion and neck supple. Lymphadenopathy:      Cervical: No cervical adenopathy. Neurological:      Mental Status: She is alert and oriented to person, place, and time. Psychiatric:         Mood and Affect: Mood and affect normal.       An electronic signature was used to authenticate this note.   -- Josiah Dotson

## 2022-10-05 DIAGNOSIS — F41.9 ANXIETY: ICD-10-CM

## 2022-10-05 DIAGNOSIS — F32.89 OTHER DEPRESSION: ICD-10-CM

## 2022-10-05 RX ORDER — SERTRALINE HYDROCHLORIDE 50 MG/1
TABLET, FILM COATED ORAL
Qty: 90 TABLET | Refills: 0 | Status: SHIPPED | OUTPATIENT
Start: 2022-10-05

## 2022-10-07 ENCOUNTER — VIRTUAL VISIT (OUTPATIENT)
Dept: PRIMARY CARE CLINIC | Age: 42
End: 2022-10-07
Payer: COMMERCIAL

## 2022-10-07 DIAGNOSIS — R05.8 OTHER COUGH: ICD-10-CM

## 2022-10-07 DIAGNOSIS — J20.9 BRONCHITIS WITH BRONCHOSPASM: Primary | ICD-10-CM

## 2022-10-07 DIAGNOSIS — M54.50 CHRONIC MIDLINE LOW BACK PAIN WITHOUT SCIATICA: ICD-10-CM

## 2022-10-07 DIAGNOSIS — G89.29 CHRONIC MIDLINE LOW BACK PAIN WITHOUT SCIATICA: ICD-10-CM

## 2022-10-07 PROCEDURE — 99213 OFFICE O/P EST LOW 20 MIN: CPT | Performed by: INTERNAL MEDICINE

## 2022-10-07 RX ORDER — METHYLPREDNISOLONE 4 MG/1
TABLET ORAL
Qty: 1 DOSE PACK | Refills: 0 | Status: SHIPPED | OUTPATIENT
Start: 2022-10-07

## 2022-10-07 RX ORDER — BENZONATATE 200 MG/1
200 CAPSULE ORAL
Qty: 21 CAPSULE | Refills: 0 | Status: SHIPPED | OUTPATIENT
Start: 2022-10-07 | End: 2022-10-14

## 2022-10-07 RX ORDER — AZITHROMYCIN 250 MG/1
TABLET, FILM COATED ORAL
Qty: 6 TABLET | Refills: 0 | Status: SHIPPED | OUTPATIENT
Start: 2022-10-07 | End: 2022-10-12

## 2022-10-07 NOTE — PROGRESS NOTES
Lurdes Suggs (: 1980) is a 38 y.o. female, established patient, here for evaluation of the following chief complaint(s):   Cold Symptoms       ASSESSMENT/PLAN:  Below is the assessment and plan developed based on review of pertinent history, labs, studies, and medications. 1. Bronchitis with bronchospasm  -     azithromycin (ZITHROMAX) 250 mg tablet; use as directed, Normal, Disp-6 Tablet, R-0  -     methylPREDNISolone (MEDROL DOSEPACK) 4 mg tablet; As directed, Normal, Disp-1 Dose Pack, R-0  2. Other cough  -     benzonatate (TESSALON) 200 mg capsule; Take 1 Capsule by mouth three (3) times daily as needed for Cough for up to 7 days. , Normal, Disp-21 Capsule, R-0  3. Chronic midline low back pain without sciatica  Followed by pain management. SUBJECTIVE/OBJECTIVE:  She started feeling sick Tuesday night, Wednesday morning had congestion. COVID was negative. Cough has been getting worse. Now feeling chest tightness and it hurts when she takes deep breath. Getting short of breath with physical activity. No fever or chills but feeling tired. Cough is now productive. She has been getting injections in her back for back pain. It has been feeling better. Also , taking Robaxin and Diclofenac as well. She thinks blood pressure reading high today as she has not slept well last night due to the cough and congestion. Review of Systems   Constitutional:  Positive for fatigue. Negative for diaphoresis. HENT:  Negative for ear discharge, ear pain, facial swelling, sore throat and trouble swallowing. Respiratory:  Positive for chest tightness, shortness of breath and wheezing. Negative for choking. Cardiovascular:  Positive for chest pain. Negative for palpitations. Gastrointestinal:  Negative for abdominal pain and diarrhea. Musculoskeletal:  Positive for back pain and myalgias. Neurological:  Negative for light-headedness and headaches.           Physical Exam    [INSTRUCTIONS:  \"[x]\" Indicates a positive item  \"[]\" Indicates a negative item  -- DELETE ALL ITEMS NOT EXAMINED]    Constitutional: [x] Appears well-developed and well-nourished [x] No apparent distress      [] Abnormal -     Mental status: [x] Alert and awake  [x] Oriented to person/place/time [x] Able to follow commands    [] Abnormal -     Eyes:   EOM    [x]  Normal    [] Abnormal -   Sclera  [x]  Normal    [] Abnormal -          Discharge [x]  None visible   [] Abnormal -     HENT: [x] Normocephalic, atraumatic  [] Abnormal -   [x] Mouth/Throat: Mucous membranes are moist    External Ears [x] Normal  [] Abnormal -    Neck: [x] No visualized mass [] Abnormal -     Pulmonary/Chest: [x] Respiratory effort normal   [x] No visualized signs of difficulty breathing or respiratory distress        [] Abnormal -      Musculoskeletal:   [x] Normal gait with no signs of ataxia         [x] Normal range of motion of neck        [] Abnormal -     Neurological:        [x] No Facial Asymmetry (Cranial nerve 7 motor function) (limited exam due to video visit)          [x] No gaze palsy        [] Abnormal -          Skin:        [x] No significant exanthematous lesions or discoloration noted on facial skin         [] Abnormal -            Psychiatric:       [x] Normal Affect [] Abnormal -        [x] No Hallucinations    Other pertinent observable physical exam findings:-        [facility], was evaluated through a synchronous (real-time) audio-video encounter. The patient (or guardian if applicable) is aware that this is a billable service, which includes applicable co-pays. This Virtual Visit was conducted with patient's (and/or legal guardian's) consent. The visit was conducted pursuant to the emergency declaration under the [address] [place] [address], [address] [place] [address] authority and the [employer] General Act.   Patient identification was verified, and a caregiver was present when appropriate. The patient was located at: Home: [address]  [place] [address]  The provider was located at: [facility] [address] [place] [address]       An electronic signature was used to authenticate this note.   -- Nelly Montgomery MD

## 2022-10-17 ENCOUNTER — HOSPITAL ENCOUNTER (EMERGENCY)
Age: 42
Discharge: HOME | End: 2022-10-17
Attending: EMERGENCY MEDICINE
Payer: COMMERCIAL

## 2022-10-17 ENCOUNTER — APPOINTMENT (OUTPATIENT)
Dept: GENERAL RADIOLOGY | Age: 42
End: 2022-10-17
Attending: EMERGENCY MEDICINE
Payer: COMMERCIAL

## 2022-10-17 VITALS
RESPIRATION RATE: 18 BRPM | DIASTOLIC BLOOD PRESSURE: 74 MMHG | OXYGEN SATURATION: 98 % | WEIGHT: 293 LBS | TEMPERATURE: 98.3 F | SYSTOLIC BLOOD PRESSURE: 135 MMHG | HEART RATE: 68 BPM | BODY MASS INDEX: 40.89 KG/M2

## 2022-10-17 DIAGNOSIS — J06.9 VIRAL URI WITH COUGH: Primary | ICD-10-CM

## 2022-10-17 PROCEDURE — 71046 X-RAY EXAM CHEST 2 VIEWS: CPT

## 2022-10-17 PROCEDURE — 99283 EMERGENCY DEPT VISIT LOW MDM: CPT

## 2022-10-17 NOTE — ED TRIAGE NOTES
Pt complaining of left sided throat/neck pain with cough and chest/head congestion.   Took a zpack and had a virtual visit with MD tonight and was told to come to ED for further evaluation

## 2022-10-18 NOTE — ED PROVIDER NOTES
58-year-old female presents with cough, congestion and swollen lymph nodes. Patient recently treated by her primary care physician with azithromycin and prednisone. Patient reports persistent symptoms. She also endorses feeling as though she was short of breath/winded. She has had some chest tightness as well. Past Medical History:   Diagnosis Date    Back pain     Bilateral ovarian cysts 2016    Dr. Levon Manjarrez    Chronic left shoulder pain     Chronic pain of left knee     Depression     GARCIA (dyspnea on exertion) 07/2020    Dr. Harpreet Reed.  EF 60-65%    Dysmenorrhea 2016    Dr. Joanne Friedman     H/O: hysterectomy 2016    ovaries intact    Hyperglycemia 2017    Hyperlipidemia 2017    IBS (irritable bowel syndrome)     w diarrhea. [facility]    Iron deficiency anemia 2017    Left knee pain 12/2019    OA. Dr. Colonel Craig    Neck pain 03/2020    DDD. Dr. Ngoc Can    Periodic heart flutter 07/2020    symptomatic. Dr. Harpreet Reed.       Past Surgical History:   Procedure Laterality Date    HX COLONOSCOPY  2010, 2015    w colon polypectomy. due q 5 yrs. HX ENDOSCOPY  2010    in [address] [place] [address] 153. HX HERNIA REPAIR  2016    w MICHAEL. HX ORTHOPAEDIC Left     left 5th toe repair    HX TOTAL ABDOMINAL HYSTERECTOMY  09/2016    ovaries intact. due to thick uterus wall. Dr. Maria Del Carmen Marie TUBAL LIGATION           Family History:   Problem Relation Age of Onset    Stroke Father         multiple    Hypertension Father     Lung Cancer Father         and THROAT CANCER    Thyroid Disease Mother 61        hypothyroid    Diabetes Mother     Hypertension Mother     High Cholesterol Mother     Heart Surgery Mother         for tachycardia.   txd w Ablation    Obesity Mother         Txd w GBP    Prostate Cancer Brother     Diabetes Brother     Hypertension Brother     Sleep Apnea Brother     Obesity Brother     Heart Attack Maternal Grandmother 43    Diabetes Maternal Grandmother     High Cholesterol Maternal Grandmother     Dementia Maternal Grandmother     Other Maternal Grandfather 27        mva    Alzheimer's Disease Paternal Grandmother     Hypertension Paternal Grandfather     Diabetes Brother     Hypertension Brother     Obesity Brother     Sleep Apnea Brother        Social History     Socioeconomic History    Marital status:      Spouse name: Not on file    Number of children: Not on file    Years of education: Not on file    Highest education level: Not on file   Occupational History    Not on file   Tobacco Use    Smoking status: Every Day     Packs/day: 0.25     Years: 12.00     Pack years: 3.00     Types: Cigarettes    Smokeless tobacco: Never   Substance and Sexual Activity    Alcohol use: Yes     Alcohol/week: 1.0 standard drink     Types: 1 Glasses of wine per week     Comment: socially     Drug use: No    Sexual activity: Yes     Partners: Male   Other Topics Concern    Not on file   Social History Narrative    ** Merged History Encounter **          Social Determinants of Health     Financial Resource Strain: Low Risk     Difficulty of Paying Living Expenses: Not hard at all   Food Insecurity: No Food Insecurity    Worried About Running Out of Food in the Last Year: Never true    Ran Out of Food in the Last Year: Never true   Transportation Needs: Not on file   Physical Activity: Not on file   Stress: Not on file   Social Connections: Not on file   Intimate Partner Violence: Not on file   Housing Stability: Not on file         ALLERGIES: Bactrim [sulfamethoprim ds], Codeine, Pcn [penicillins], Pcn [penicillins], and Sulfur    Review of Systems   Constitutional:  Negative for fever. HENT:  Negative for rhinorrhea. Respiratory:  Positive for shortness of breath. Cardiovascular:  Positive for chest pain. Gastrointestinal:  Negative for abdominal pain. Genitourinary:  Negative for dysuria. Musculoskeletal:  Negative for back pain. Skin:  Negative for wound.    Neurological: Negative for headaches. Psychiatric/Behavioral:  Negative for confusion. Vitals:    10/17/22 1951   BP: 135/74   Pulse: 68   Resp: 18   Temp: 98.3 °F (36.8 °C)   SpO2: 98%   Weight: 133 kg (293 lb 3.4 oz)            Physical Exam  Vitals and nursing note reviewed. Constitutional:       General: She is not in acute distress. Appearance: Normal appearance. She is not ill-appearing, toxic-appearing or diaphoretic. HENT:      Head: Normocephalic and atraumatic. Eyes:      Extraocular Movements: Extraocular movements intact. Cardiovascular:      Rate and Rhythm: Normal rate. Pulses: Normal pulses. Pulmonary:      Effort: Pulmonary effort is normal. No respiratory distress. Breath sounds: Normal breath sounds. Abdominal:      General: There is no distension. Musculoskeletal:         General: Normal range of motion. Cervical back: Normal range of motion. Skin:     General: Skin is dry. Neurological:      Mental Status: She is alert and oriented to person, place, and time. Psychiatric:         Mood and Affect: Mood normal.        MDM  Number of Diagnoses or Management Options  Viral URI with cough  Diagnosis management comments:     Chest x-ray shows no pneumonia. Patient has normal vitals. Presentation consistent with viral URI. Recommended over-the-counter treatment and follow-up with family medicine. Discussed my clinical impression(s), any labs and/or radiology results with the patient. I answered any questions and addressed any concerns. Discussed the importance of following up with their primary care physician and/or specialist(s). Discussed signs or symptoms that would warrant return back to the ER for further evaluation. The patient is agreeable with discharge.            Procedures

## 2022-12-16 DIAGNOSIS — F41.9 ANXIETY: ICD-10-CM

## 2022-12-16 DIAGNOSIS — F32.89 OTHER DEPRESSION: ICD-10-CM

## 2022-12-16 RX ORDER — SERTRALINE HYDROCHLORIDE 50 MG/1
TABLET, FILM COATED ORAL
Qty: 90 TABLET | Refills: 0 | Status: SHIPPED | OUTPATIENT
Start: 2022-12-16

## 2023-03-20 ENCOUNTER — TRANSCRIBE ORDER (OUTPATIENT)
Dept: SCHEDULING | Age: 43
End: 2023-03-20

## 2023-03-20 DIAGNOSIS — Z12.31 VISIT FOR SCREENING MAMMOGRAM: Primary | ICD-10-CM

## 2023-04-18 DIAGNOSIS — F41.9 ANXIETY: ICD-10-CM

## 2023-04-18 DIAGNOSIS — F32.89 OTHER DEPRESSION: ICD-10-CM

## 2023-04-18 RX ORDER — SERTRALINE HYDROCHLORIDE 50 MG/1
TABLET, FILM COATED ORAL
Qty: 90 TABLET | Refills: 0 | Status: SHIPPED | OUTPATIENT
Start: 2023-04-18

## 2023-04-23 DIAGNOSIS — Z12.31 VISIT FOR SCREENING MAMMOGRAM: Primary | ICD-10-CM

## 2023-04-28 ENCOUNTER — HOSPITAL ENCOUNTER (OUTPATIENT)
Dept: MAMMOGRAPHY | Age: 43
Discharge: HOME | End: 2023-04-28
Attending: INTERNAL MEDICINE
Payer: COMMERCIAL

## 2023-04-28 DIAGNOSIS — Z12.31 VISIT FOR SCREENING MAMMOGRAM: ICD-10-CM

## 2023-04-28 PROCEDURE — 77063 BREAST TOMOSYNTHESIS BI: CPT

## 2023-06-16 SDOH — ECONOMIC STABILITY: FOOD INSECURITY: WITHIN THE PAST 12 MONTHS, YOU WORRIED THAT YOUR FOOD WOULD RUN OUT BEFORE YOU GOT MONEY TO BUY MORE.: NEVER TRUE

## 2023-06-16 SDOH — ECONOMIC STABILITY: FOOD INSECURITY: WITHIN THE PAST 12 MONTHS, THE FOOD YOU BOUGHT JUST DIDN'T LAST AND YOU DIDN'T HAVE MONEY TO GET MORE.: NEVER TRUE

## 2023-06-16 SDOH — ECONOMIC STABILITY: HOUSING INSECURITY
IN THE LAST 12 MONTHS, WAS THERE A TIME WHEN YOU DID NOT HAVE A STEADY PLACE TO SLEEP OR SLEPT IN A SHELTER (INCLUDING NOW)?: NO

## 2023-06-16 SDOH — ECONOMIC STABILITY: TRANSPORTATION INSECURITY
IN THE PAST 12 MONTHS, HAS LACK OF TRANSPORTATION KEPT YOU FROM MEETINGS, WORK, OR FROM GETTING THINGS NEEDED FOR DAILY LIVING?: NO

## 2023-06-16 SDOH — ECONOMIC STABILITY: INCOME INSECURITY: HOW HARD IS IT FOR YOU TO PAY FOR THE VERY BASICS LIKE FOOD, HOUSING, MEDICAL CARE, AND HEATING?: NOT HARD AT ALL

## 2023-06-19 ENCOUNTER — OFFICE VISIT (OUTPATIENT)
Dept: PRIMARY CARE CLINIC | Facility: CLINIC | Age: 43
End: 2023-06-19
Payer: COMMERCIAL

## 2023-06-19 VITALS
TEMPERATURE: 97.8 F | BODY MASS INDEX: 38.95 KG/M2 | SYSTOLIC BLOOD PRESSURE: 136 MMHG | RESPIRATION RATE: 18 BRPM | DIASTOLIC BLOOD PRESSURE: 85 MMHG | OXYGEN SATURATION: 100 % | WEIGHT: 278.2 LBS | HEART RATE: 63 BPM | HEIGHT: 71 IN

## 2023-06-19 DIAGNOSIS — E78.2 MIXED HYPERLIPIDEMIA: ICD-10-CM

## 2023-06-19 DIAGNOSIS — H53.8 BLURRED VISION, LEFT EYE: ICD-10-CM

## 2023-06-19 DIAGNOSIS — G43.801 OCULAR MIGRAINE WITH STATUS MIGRAINOSUS: Primary | ICD-10-CM

## 2023-06-19 DIAGNOSIS — Z11.4 ENCOUNTER FOR SCREENING FOR HIV: ICD-10-CM

## 2023-06-19 DIAGNOSIS — H35.412 LATTICE DEGENERATION OF LEFT RETINA: ICD-10-CM

## 2023-06-19 DIAGNOSIS — R73.02 IGT (IMPAIRED GLUCOSE TOLERANCE): ICD-10-CM

## 2023-06-19 DIAGNOSIS — L30.8 OTHER ECZEMA: ICD-10-CM

## 2023-06-19 DIAGNOSIS — R51.9 TEMPORAL PAIN: ICD-10-CM

## 2023-06-19 PROCEDURE — 99214 OFFICE O/P EST MOD 30 MIN: CPT | Performed by: INTERNAL MEDICINE

## 2023-06-19 RX ORDER — METHYLPREDNISOLONE 4 MG/1
TABLET ORAL
Qty: 1 KIT | Refills: 0 | Status: SHIPPED | OUTPATIENT
Start: 2023-06-19 | End: 2023-06-25

## 2023-06-19 ASSESSMENT — ENCOUNTER SYMPTOMS
EYE DISCHARGE: 0
COUGH: 0
DIARRHEA: 0
SORE THROAT: 0
ABDOMINAL PAIN: 0
CONSTIPATION: 0
BACK PAIN: 0
CHEST TIGHTNESS: 0
SHORTNESS OF BREATH: 0
COLOR CHANGE: 0
RHINORRHEA: 0

## 2023-06-19 ASSESSMENT — PATIENT HEALTH QUESTIONNAIRE - PHQ9
SUM OF ALL RESPONSES TO PHQ QUESTIONS 1-9: 0
SUM OF ALL RESPONSES TO PHQ9 QUESTIONS 1 & 2: 0
SUM OF ALL RESPONSES TO PHQ QUESTIONS 1-9: 0
2. FEELING DOWN, DEPRESSED OR HOPELESS: 0
1. LITTLE INTEREST OR PLEASURE IN DOING THINGS: 0
SUM OF ALL RESPONSES TO PHQ QUESTIONS 1-9: 0
SUM OF ALL RESPONSES TO PHQ QUESTIONS 1-9: 0

## 2023-06-19 NOTE — PROGRESS NOTES
Health Decision Maker has been checked with the patient      Patient has stated that the scribe can come in room    Chief Complaint   Patient presents with    Follow-up    Loss of Vision    Headache     Left side of face  temporal and back side of head check bone area of having pressure    Blurred Vision     Depression: Not at risk    PHQ-2 Score: 0      BP (!) 140/90 (Site: Left Upper Arm) Comment: manually  Pulse 63   Temp 97.8 °F (36.6 °C)   Resp 18   Ht 5' 11\" (1.803 m)   Wt 278 lb 3.2 oz (126.2 kg)   SpO2 100%   BMI 38.80 kg/m²     1. \"Have you been to the ER, urgent care clinic since your last visit? Hospitalized since your last visit? \" Yes      2. \"Have you seen or consulted any other health care providers outside of the [address] [place] [address] since your last visit? \" Yes Spine and Pain Specialist, Eye doctor, Retina Specailist       3. For patients aged 39-70: Has the patient had a colonoscopy / FIT/ Cologuard? No      If the patient is female:    4. For patients aged 41-77: Has the patient had a mammogram within the past 2 years? Yes - no Care Gap present      5. For patients aged 21-65: Has the patient had a pap smear?  Yes - no Care Gap present

## 2023-06-19 NOTE — PROGRESS NOTES
Arti Mcknight (: 1980) is a 37 y.o. female, established patient, here for evaluation of the following chief complaint(s):  Follow-up, Loss of Vision, Headache (Left side of face  temporal and back side of head check bone area of having pressure), and Blurred Vision    ASSESSMENT/PLAN:  Below is the assessment and plan developed based on review of pertinent history, physical exam, labs, studies, and medications. 1. Ocular migraine with status migrainosus  -     methylPREDNISolone (MEDROL DOSEPACK) 4 MG tablet; Take by mouth., Disp-1 kit, R-0Normal sent to pharmacy. I want the patient to take the medrol dosepack as follows: 3 pills x 1 day, 2 pills x 2 days, 1 pill x 2 days and 1/2 pill x 1 day. The patient was advised to take this medication with food. Potential side effects were discussed. 2. Lattice degeneration of left retina  She is followed by the retinal specialist and ophthalmologist annually. 3. Other eczema  I recommend that she continues taking Dupixent 2 mL injections. 4. Encounter for screening for HIV  -     HIV 1/2 Ag/Ab, 4TH Generation,W Rflx Confirm; Future  I ordered an HIV screening test.    5. IGT (impaired glucose tolerance)  -     Hemoglobin A1C; Future  I ordered blood work to check her hemoglobin A1C level. 6. Blurred vision, left eye  -     MRI BRAIN W WO CONTRAST; Future  I ordered an MRI of the brain. 7. Temporal pain  -     Sedimentation Rate; Future  -     methylPREDNISolone (MEDROL DOSEPACK) 4 MG tablet; Take by mouth., Disp-1 kit, R-0Normal sent to pharmacy.   -     MRI C/ Bell 29; Future  I ordered blood work to check her ESR level. I want the patient to take the medrol dosepack as follows: 3 pills x 1 day, 2 pills x 2 days, 1 pill x 2 days and 1/2 pill x 1 day. The patient was advised to take this medication with food. Potential side effects were discussed. I ordered an MRI of the brain. 8. Mixed hyperlipidemia  -     Lipid Panel;  Future  I

## 2023-06-20 LAB
CHOLEST SERPL-MCNC: 177 MG/DL
ERYTHROCYTE [SEDIMENTATION RATE] IN BLOOD: 5 MM/HR (ref 0–20)
EST. AVERAGE GLUCOSE BLD GHB EST-MCNC: 120 MG/DL
HBA1C MFR BLD: 5.8 % (ref 4–5.6)
HDLC SERPL-MCNC: 35 MG/DL
HDLC SERPL: 5.1 (ref 0–5)
HIV 1+2 AB+HIV1 P24 AG SERPL QL IA: NONREACTIVE
HIV 1/2 RESULT COMMENT: NORMAL
LDLC SERPL CALC-MCNC: 128.6 MG/DL (ref 0–100)
TRIGL SERPL-MCNC: 67 MG/DL
VLDLC SERPL CALC-MCNC: 13.4 MG/DL

## 2023-06-21 ENCOUNTER — HOSPITAL ENCOUNTER (OUTPATIENT)
Facility: HOSPITAL | Age: 43
Discharge: HOME | End: 2023-06-24
Attending: INTERNAL MEDICINE
Payer: COMMERCIAL

## 2023-06-21 DIAGNOSIS — H53.8 BLURRED VISION, LEFT EYE: ICD-10-CM

## 2023-06-21 DIAGNOSIS — R51.9 TEMPORAL PAIN: ICD-10-CM

## 2023-06-21 PROCEDURE — A9579 GAD-BASE MR CONTRAST NOS,1ML: HCPCS | Performed by: INTERNAL MEDICINE

## 2023-06-21 PROCEDURE — 6360000004 HC RX CONTRAST MEDICATION: Performed by: INTERNAL MEDICINE

## 2023-06-21 PROCEDURE — 70553 MRI BRAIN STEM W/O & W/DYE: CPT

## 2023-06-21 RX ADMIN — GADOTERIDOL 20 ML: 279.3 INJECTION, SOLUTION INTRAVENOUS at 14:24

## 2023-06-29 ENCOUNTER — TELEPHONE (OUTPATIENT)
Dept: PRIMARY CARE CLINIC | Facility: CLINIC | Age: 43
End: 2023-06-29

## 2023-06-29 NOTE — TELEPHONE ENCOUNTER
Appointment Request From: Maxwell Moreno     With Provider: Ericka Mckeon MD [facility]     Preferred Date Range: 7/4/2023 - 7/7/2023     Preferred Times: Any Time     Reason for visit: Request an Appointment     Comments: Follow-up. missed work since 6/12/2023 paperwork completed.

## 2023-07-03 ENCOUNTER — HOSPITAL ENCOUNTER (OUTPATIENT)
Facility: HOSPITAL | Age: 43
Discharge: HOME | End: 2023-07-06
Payer: COMMERCIAL

## 2023-07-03 DIAGNOSIS — M54.12 CERVICAL RADICULOPATHY: ICD-10-CM

## 2023-07-03 PROCEDURE — 72141 MRI NECK SPINE W/O DYE: CPT

## 2023-07-10 ENCOUNTER — OFFICE VISIT (OUTPATIENT)
Dept: PRIMARY CARE CLINIC | Facility: CLINIC | Age: 43
End: 2023-07-10
Payer: COMMERCIAL

## 2023-07-10 VITALS
DIASTOLIC BLOOD PRESSURE: 76 MMHG | OXYGEN SATURATION: 100 % | HEIGHT: 71 IN | SYSTOLIC BLOOD PRESSURE: 132 MMHG | WEIGHT: 271.2 LBS | RESPIRATION RATE: 18 BRPM | HEART RATE: 82 BPM | BODY MASS INDEX: 37.97 KG/M2 | TEMPERATURE: 97.8 F

## 2023-07-10 DIAGNOSIS — M48.02 CERVICAL SPINAL STENOSIS: ICD-10-CM

## 2023-07-10 DIAGNOSIS — L30.9 ECZEMA OF BOTH HANDS: ICD-10-CM

## 2023-07-10 DIAGNOSIS — R51.9 FREQUENT HEADACHES: Primary | ICD-10-CM

## 2023-07-10 DIAGNOSIS — F41.9 ANXIETY: ICD-10-CM

## 2023-07-10 PROBLEM — G89.29 CHRONIC PAIN OF RIGHT KNEE: Status: RESOLVED | Noted: 2017-12-07 | Resolved: 2023-07-10

## 2023-07-10 PROBLEM — M25.561 CHRONIC PAIN OF RIGHT KNEE: Status: RESOLVED | Noted: 2017-12-07 | Resolved: 2023-07-10

## 2023-07-10 PROCEDURE — 99214 OFFICE O/P EST MOD 30 MIN: CPT | Performed by: INTERNAL MEDICINE

## 2023-07-10 RX ORDER — SERTRALINE HYDROCHLORIDE 100 MG/1
100 TABLET, FILM COATED ORAL DAILY
Qty: 90 TABLET | Refills: 1 | Status: SHIPPED | OUTPATIENT
Start: 2023-07-10 | End: 2024-01-06

## 2023-07-10 SDOH — ECONOMIC STABILITY: FOOD INSECURITY: WITHIN THE PAST 12 MONTHS, THE FOOD YOU BOUGHT JUST DIDN'T LAST AND YOU DIDN'T HAVE MONEY TO GET MORE.: NEVER TRUE

## 2023-07-10 SDOH — ECONOMIC STABILITY: FOOD INSECURITY: WITHIN THE PAST 12 MONTHS, YOU WORRIED THAT YOUR FOOD WOULD RUN OUT BEFORE YOU GOT MONEY TO BUY MORE.: NEVER TRUE

## 2023-07-10 SDOH — ECONOMIC STABILITY: INCOME INSECURITY: HOW HARD IS IT FOR YOU TO PAY FOR THE VERY BASICS LIKE FOOD, HOUSING, MEDICAL CARE, AND HEATING?: NOT HARD AT ALL

## 2023-07-10 ASSESSMENT — PATIENT HEALTH QUESTIONNAIRE - PHQ9
SUM OF ALL RESPONSES TO PHQ9 QUESTIONS 1 & 2: 0
2. FEELING DOWN, DEPRESSED OR HOPELESS: 0
SUM OF ALL RESPONSES TO PHQ QUESTIONS 1-9: 0
1. LITTLE INTEREST OR PLEASURE IN DOING THINGS: 0

## 2023-07-10 ASSESSMENT — ENCOUNTER SYMPTOMS
COLOR CHANGE: 0
CONSTIPATION: 0
RHINORRHEA: 0
DIARRHEA: 0
BACK PAIN: 1
COUGH: 0
EYE DISCHARGE: 0
SHORTNESS OF BREATH: 0
CHEST TIGHTNESS: 0
SORE THROAT: 0
ABDOMINAL PAIN: 0

## 2023-07-16 DIAGNOSIS — F32.89 OTHER SPECIFIED DEPRESSIVE EPISODES: ICD-10-CM

## 2023-07-16 DIAGNOSIS — F41.9 ANXIETY DISORDER, UNSPECIFIED: ICD-10-CM

## 2023-08-07 ENCOUNTER — OFFICE VISIT (OUTPATIENT)
Age: 43
End: 2023-08-07
Payer: COMMERCIAL

## 2023-08-07 VITALS
HEIGHT: 71 IN | BODY MASS INDEX: 38.5 KG/M2 | SYSTOLIC BLOOD PRESSURE: 136 MMHG | DIASTOLIC BLOOD PRESSURE: 88 MMHG | WEIGHT: 275 LBS | HEART RATE: 90 BPM | OXYGEN SATURATION: 98 %

## 2023-08-07 DIAGNOSIS — R51.9 DAILY HEADACHE: ICD-10-CM

## 2023-08-07 DIAGNOSIS — G43.109 OCULAR MIGRAINE: Primary | ICD-10-CM

## 2023-08-07 PROCEDURE — 99204 OFFICE O/P NEW MOD 45 MIN: CPT | Performed by: PSYCHIATRY & NEUROLOGY

## 2023-08-07 RX ORDER — BUTALBITAL, ACETAMINOPHEN AND CAFFEINE 300; 40; 50 MG/1; MG/1; MG/1
CAPSULE ORAL
COMMUNITY
Start: 2023-06-23 | End: 2023-08-07

## 2023-08-07 RX ORDER — MELOXICAM 15 MG/1
TABLET ORAL
COMMUNITY
Start: 2021-07-07 | End: 2023-08-07

## 2023-08-07 RX ORDER — DICLOFENAC SODIUM 75 MG/1
TABLET, DELAYED RELEASE ORAL
COMMUNITY
Start: 2022-09-19

## 2023-08-07 RX ORDER — METHOCARBAMOL 500 MG/1
TABLET, FILM COATED ORAL
COMMUNITY
Start: 2022-09-19 | End: 2023-08-07

## 2023-08-07 RX ORDER — LIDOCAINE 50 MG/G
PATCH TOPICAL
COMMUNITY
Start: 2022-12-02

## 2023-08-07 RX ORDER — GABAPENTIN 300 MG/1
300 CAPSULE ORAL NIGHTLY
COMMUNITY
Start: 2022-07-07 | End: 2023-08-07

## 2023-08-07 RX ORDER — CYCLOBENZAPRINE HCL 10 MG
10 TABLET ORAL 3 TIMES DAILY PRN
COMMUNITY
Start: 2022-08-17 | End: 2023-08-07

## 2023-08-07 RX ORDER — TOPIRAMATE 25 MG/1
25 TABLET ORAL 2 TIMES DAILY
Qty: 180 TABLET | Refills: 1 | Status: SHIPPED | OUTPATIENT
Start: 2023-08-07

## 2023-08-07 RX ORDER — SUMATRIPTAN 50 MG/1
50 TABLET, FILM COATED ORAL AS NEEDED
Qty: 9 TABLET | Refills: 1 | Status: SHIPPED | OUTPATIENT
Start: 2023-08-07

## 2023-08-07 ASSESSMENT — PATIENT HEALTH QUESTIONNAIRE - PHQ9
SUM OF ALL RESPONSES TO PHQ QUESTIONS 1-9: 2
1. LITTLE INTEREST OR PLEASURE IN DOING THINGS: 1
SUM OF ALL RESPONSES TO PHQ QUESTIONS 1-9: 2
SUM OF ALL RESPONSES TO PHQ9 QUESTIONS 1 & 2: 2
SUM OF ALL RESPONSES TO PHQ QUESTIONS 1-9: 2
SUM OF ALL RESPONSES TO PHQ QUESTIONS 1-9: 2
2. FEELING DOWN, DEPRESSED OR HOPELESS: 1

## 2023-08-07 NOTE — PROGRESS NOTES
Disc osteophyte is asymmetric to the right and contacts the anterior cord  narrowing the central canal to 8.2 mm (series 6, image 14). C5-C6: Small central protrusion contacts anterior cord, narrowing the central  canal to 7.3 mm anterior to posterior (image 18). C6-C7: Disc osteophyte complex narrows the central canal to 9.3 mm anterior to  posterior (series 6, image 22). C7-T1: No herniation or stenosis. Impression  Mild central canal stenosis C4-5 through C6-7 as detailed above    MRI of the brain was negative    CT Result (most recent):  CTA HEAD NECK W CONTRAST 06/13/2023    Narrative  INDICATION: eye pain,  headache, vision change    EXAMINATION:  CT ANGIOGRAPHY HEAD AND NECK    COMPARISON: None    TECHNIQUE:  Following the uneventful administration of iodinated contrast  material, axial CT angiography of the head and neck was performed. Delayed axial  images through the head were also obtained. Coronal and sagittal reconstructions  were obtained. Manual postprocessing of images was performed. 3-D  Sagittal  maximal intensity projection images were obtained. 3-D Coronal maximal  intensity projections were obtained. CT dose reduction was achieved through use  of a standardized protocol tailored for this examination and automatic exposure  control for dose modulation. FINDINGS:    CTA NECK:    Aortic Arch: No significant abnormality. Right Common Carotid Artery: No significant abnormality. Right Internal Carotid Artery: No significant abnormality. NASCET Right: 0-25%. Left Common Carotid Artery: No significant abnormality. Left Internal Carotid Artery: No significant abnormality. NASCET Left: 0-25%. Carotid stenosis determined using NASCET criteria. Right Vertebral Artery: No significant abnormality. Left Vertebral Artery: Patent, portions of the proximal cervical segment are  obscured due to adjacent venous contrast.  Cervical Soft Tissues: No significant abnormality.   Lung Apices: No supervision

## 2023-08-25 ENCOUNTER — OFFICE VISIT (OUTPATIENT)
Dept: PRIMARY CARE CLINIC | Facility: CLINIC | Age: 43
End: 2023-08-25
Payer: COMMERCIAL

## 2023-08-25 VITALS
SYSTOLIC BLOOD PRESSURE: 126 MMHG | TEMPERATURE: 97.1 F | WEIGHT: 275.6 LBS | OXYGEN SATURATION: 99 % | HEIGHT: 71 IN | DIASTOLIC BLOOD PRESSURE: 71 MMHG | HEART RATE: 66 BPM | RESPIRATION RATE: 18 BRPM | BODY MASS INDEX: 38.58 KG/M2

## 2023-08-25 DIAGNOSIS — M54.2 NECK PAIN: ICD-10-CM

## 2023-08-25 DIAGNOSIS — M79.671 PAIN OF BOTH HEELS: ICD-10-CM

## 2023-08-25 DIAGNOSIS — G43.109 OCULAR MIGRAINE: Primary | ICD-10-CM

## 2023-08-25 DIAGNOSIS — F41.9 ANXIETY AND DEPRESSION: ICD-10-CM

## 2023-08-25 DIAGNOSIS — M25.512 CHRONIC PAIN OF BOTH SHOULDERS: ICD-10-CM

## 2023-08-25 DIAGNOSIS — M79.672 PAIN OF BOTH HEELS: ICD-10-CM

## 2023-08-25 DIAGNOSIS — M25.511 CHRONIC PAIN OF BOTH SHOULDERS: ICD-10-CM

## 2023-08-25 DIAGNOSIS — G89.29 CHRONIC PAIN OF BOTH SHOULDERS: ICD-10-CM

## 2023-08-25 DIAGNOSIS — F32.A ANXIETY AND DEPRESSION: ICD-10-CM

## 2023-08-25 PROCEDURE — 99214 OFFICE O/P EST MOD 30 MIN: CPT | Performed by: INTERNAL MEDICINE

## 2023-08-25 RX ORDER — DICLOFENAC SODIUM 75 MG/1
75 TABLET, DELAYED RELEASE ORAL 2 TIMES DAILY
Qty: 60 TABLET | Refills: 0 | Status: SHIPPED | OUTPATIENT
Start: 2023-08-25 | End: 2023-09-24

## 2023-08-25 RX ORDER — TOPIRAMATE 25 MG/1
25 TABLET ORAL 2 TIMES DAILY
Qty: 180 TABLET | Refills: 1 | Status: CANCELLED | OUTPATIENT
Start: 2023-08-25

## 2023-08-25 RX ORDER — TOPIRAMATE 25 MG/1
25 TABLET ORAL 2 TIMES DAILY
Qty: 180 TABLET | Refills: 0 | Status: SHIPPED | OUTPATIENT
Start: 2023-08-25 | End: 2023-11-23

## 2023-08-25 RX ORDER — DICLOFENAC SODIUM 75 MG/1
75 TABLET, DELAYED RELEASE ORAL 2 TIMES DAILY
Qty: 60 TABLET | Refills: 0 | Status: CANCELLED | OUTPATIENT
Start: 2023-08-25

## 2023-08-25 ASSESSMENT — PATIENT HEALTH QUESTIONNAIRE - PHQ9
SUM OF ALL RESPONSES TO PHQ QUESTIONS 1-9: 0
SUM OF ALL RESPONSES TO PHQ QUESTIONS 1-9: 0
2. FEELING DOWN, DEPRESSED OR HOPELESS: 0
1. LITTLE INTEREST OR PLEASURE IN DOING THINGS: 0
SUM OF ALL RESPONSES TO PHQ QUESTIONS 1-9: 0
SUM OF ALL RESPONSES TO PHQ9 QUESTIONS 1 & 2: 0
SUM OF ALL RESPONSES TO PHQ QUESTIONS 1-9: 0

## 2023-08-25 ASSESSMENT — ENCOUNTER SYMPTOMS
SHORTNESS OF BREATH: 0
BACK PAIN: 1
RHINORRHEA: 0
COUGH: 0
CONSTIPATION: 0
DIARRHEA: 0
EYE DISCHARGE: 0
SORE THROAT: 0
ABDOMINAL PAIN: 0
COLOR CHANGE: 0
CHEST TIGHTNESS: 0

## 2023-08-25 NOTE — PROGRESS NOTES
Vince Blunt (:  1980) is a 37 y.o. female, Established patient, here for evaluation of the following chief complaint(s):  Follow-up         ASSESSMENT/PLAN:  1. Ocular migraine  -     topiramate (TOPAMAX) 25 MG tablet; Take 1 tablet by mouth 2 times daily, Disp-180 tablet, R-0Normal sent to pharmacy. I refilled her prescription for Topamax. I recommend that she continue taking Imitrex 50 mg PRN. 2. Anxiety and depression  I recommend that she continue taking Zoloft 100 mg daily. 3. Chronic pain of both shoulders  -     diclofenac (VOLTAREN) 75 MG EC tablet; Take 1 tablet by mouth 2 times daily, Disp-60 tablet, R-0Normal sent to pharmacy. I recommend that she continue taking diclofenac 75 mg BID. I will order a CMP in the future. 4. Neck pain  -     diclofenac (VOLTAREN) 75 MG EC tablet; Take 1 tablet by mouth 2 times daily, Disp-60 tablet, R-0Normal sent to pharmacy. I recommend that she continue taking diclofenac 75 mg BID. I will order a CMP in the future. 5. Pain of both heels  -     [facility] - Kristy Payne DPM, [facility] ([address])  I recommend that she use inserts. I referred her to Podiatry. Subjective   SUBJECTIVE/OBJECTIVE:  HPI    Patient presents today for a follow up for chronic conditions. She has been having headaches. She is followed by Neurology. She has been prescribed Topamax 25 mg and Imitrex 50 mg to be taken PRN. She was unable to  the Topamax. She states that she has pain in her heal and gets worse through out the day. She is taking Zoloft 100 mg and notes that the increase has been very helpful. She takes diclofenac 75 mg BID for back and shoulder pain and would like the refills.        Patient Active Problem List   Diagnosis    Chronic left shoulder pain    Obesity, morbid (HCC)    IBS (irritable bowel syndrome)    S/P hysterectomy    Hyperlipidemia    Anxiety    Eczema of both hands        Current Outpatient Medications on

## 2023-09-29 DIAGNOSIS — G89.29 CHRONIC PAIN OF BOTH SHOULDERS: ICD-10-CM

## 2023-09-29 DIAGNOSIS — M25.511 CHRONIC PAIN OF BOTH SHOULDERS: ICD-10-CM

## 2023-09-29 DIAGNOSIS — M25.512 CHRONIC PAIN OF BOTH SHOULDERS: ICD-10-CM

## 2023-09-29 DIAGNOSIS — M54.2 NECK PAIN: ICD-10-CM

## 2023-09-29 RX ORDER — DICLOFENAC SODIUM 75 MG/1
75 TABLET, DELAYED RELEASE ORAL 2 TIMES DAILY
Qty: 60 TABLET | Refills: 0 | Status: SHIPPED | OUTPATIENT
Start: 2023-09-29

## 2023-12-13 DIAGNOSIS — M54.2 NECK PAIN: ICD-10-CM

## 2023-12-13 DIAGNOSIS — M25.512 CHRONIC PAIN OF BOTH SHOULDERS: ICD-10-CM

## 2023-12-13 DIAGNOSIS — M25.511 CHRONIC PAIN OF BOTH SHOULDERS: ICD-10-CM

## 2023-12-13 DIAGNOSIS — G89.29 CHRONIC PAIN OF BOTH SHOULDERS: ICD-10-CM

## 2023-12-13 RX ORDER — DICLOFENAC SODIUM 75 MG/1
75 TABLET, DELAYED RELEASE ORAL 2 TIMES DAILY
Qty: 60 TABLET | Refills: 0 | Status: SHIPPED | OUTPATIENT
Start: 2023-12-13

## 2024-01-04 DIAGNOSIS — F41.9 ANXIETY: ICD-10-CM

## 2024-01-04 RX ORDER — SERTRALINE HYDROCHLORIDE 100 MG/1
100 TABLET, FILM COATED ORAL DAILY
Qty: 90 TABLET | Refills: 1 | Status: SHIPPED | OUTPATIENT
Start: 2024-01-04

## 2024-03-14 DIAGNOSIS — M54.2 NECK PAIN: ICD-10-CM

## 2024-03-14 DIAGNOSIS — M25.512 CHRONIC PAIN OF BOTH SHOULDERS: ICD-10-CM

## 2024-03-14 DIAGNOSIS — M25.511 CHRONIC PAIN OF BOTH SHOULDERS: ICD-10-CM

## 2024-03-14 DIAGNOSIS — G89.29 CHRONIC PAIN OF BOTH SHOULDERS: ICD-10-CM

## 2024-03-14 RX ORDER — DICLOFENAC SODIUM 75 MG/1
75 TABLET, DELAYED RELEASE ORAL 2 TIMES DAILY
Qty: 60 TABLET | Refills: 0 | Status: SHIPPED | OUTPATIENT
Start: 2024-03-14

## 2024-04-10 DIAGNOSIS — G89.29 CHRONIC PAIN OF BOTH SHOULDERS: ICD-10-CM

## 2024-04-10 DIAGNOSIS — M25.512 CHRONIC PAIN OF BOTH SHOULDERS: ICD-10-CM

## 2024-04-10 DIAGNOSIS — M54.2 NECK PAIN: ICD-10-CM

## 2024-04-10 DIAGNOSIS — M25.511 CHRONIC PAIN OF BOTH SHOULDERS: ICD-10-CM

## 2024-04-10 RX ORDER — DICLOFENAC SODIUM 75 MG/1
75 TABLET, DELAYED RELEASE ORAL 2 TIMES DAILY
Qty: 60 TABLET | Refills: 2 | Status: SHIPPED | OUTPATIENT
Start: 2024-04-10 | End: 2024-07-09

## 2024-05-08 ENCOUNTER — COMMUNITY OUTREACH (OUTPATIENT)
Dept: PRIMARY CARE CLINIC | Facility: CLINIC | Age: 44
End: 2024-05-08

## 2024-05-10 ENCOUNTER — HOSPITAL ENCOUNTER (OUTPATIENT)
Facility: HOSPITAL | Age: 44
Discharge: HOME | End: 2024-05-10
Payer: COMMERCIAL

## 2024-05-10 ENCOUNTER — TRANSCRIBE ORDERS (OUTPATIENT)
Facility: HOSPITAL | Age: 44
End: 2024-05-10

## 2024-05-10 DIAGNOSIS — Z12.31 OTHER SCREENING MAMMOGRAM: ICD-10-CM

## 2024-05-10 DIAGNOSIS — Z12.31 OTHER SCREENING MAMMOGRAM: Primary | ICD-10-CM

## 2024-05-10 PROCEDURE — 77063 BREAST TOMOSYNTHESIS BI: CPT

## 2024-06-19 DIAGNOSIS — F41.9 ANXIETY: ICD-10-CM

## 2024-06-21 RX ORDER — SERTRALINE HYDROCHLORIDE 100 MG/1
100 TABLET, FILM COATED ORAL DAILY
Qty: 30 TABLET | Refills: 0 | Status: SHIPPED | OUTPATIENT
Start: 2024-06-21

## 2024-07-13 DIAGNOSIS — F41.9 ANXIETY: ICD-10-CM

## 2024-07-16 RX ORDER — SERTRALINE HYDROCHLORIDE 100 MG/1
100 TABLET, FILM COATED ORAL DAILY
Qty: 90 TABLET | Refills: 1 | Status: SHIPPED | OUTPATIENT
Start: 2024-07-16

## 2024-07-16 NOTE — TELEPHONE ENCOUNTER
PCP: Corry Chambers MD    Last Visit Visit date not found   No future appointments.    Requested Prescriptions     Pending Prescriptions Disp Refills    sertraline (ZOLOFT) 100 MG tablet [Pharmacy Med Name: SERTRALINE  MG TABLET] 90 tablet 1     Sig: TAKE 1 TABLET BY MOUTH EVERY DAY         Other Comments: Last Refill 06/21/2024, last seen in office 8/25/2023

## 2024-08-13 DIAGNOSIS — M25.511 CHRONIC PAIN OF BOTH SHOULDERS: ICD-10-CM

## 2024-08-13 DIAGNOSIS — G89.29 CHRONIC PAIN OF BOTH SHOULDERS: ICD-10-CM

## 2024-08-13 DIAGNOSIS — M54.2 NECK PAIN: ICD-10-CM

## 2024-08-13 DIAGNOSIS — M25.512 CHRONIC PAIN OF BOTH SHOULDERS: ICD-10-CM

## 2024-08-13 RX ORDER — DICLOFENAC SODIUM 75 MG/1
75 TABLET, DELAYED RELEASE ORAL 2 TIMES DAILY
Qty: 60 TABLET | Refills: 0 | Status: SHIPPED | OUTPATIENT
Start: 2024-08-13

## 2024-11-03 DIAGNOSIS — M25.512 CHRONIC PAIN OF BOTH SHOULDERS: ICD-10-CM

## 2024-11-03 DIAGNOSIS — G89.29 CHRONIC PAIN OF BOTH SHOULDERS: ICD-10-CM

## 2024-11-03 DIAGNOSIS — M54.2 NECK PAIN: ICD-10-CM

## 2024-11-03 DIAGNOSIS — M25.511 CHRONIC PAIN OF BOTH SHOULDERS: ICD-10-CM

## 2024-11-03 RX ORDER — DICLOFENAC SODIUM 75 MG/1
75 TABLET, DELAYED RELEASE ORAL 2 TIMES DAILY
Qty: 60 TABLET | Refills: 0 | OUTPATIENT
Start: 2024-11-03

## 2024-11-22 ENCOUNTER — HOSPITAL ENCOUNTER (OUTPATIENT)
Facility: HOSPITAL | Age: 44
Discharge: HOME | End: 2024-11-22
Attending: INTERNAL MEDICINE
Payer: COMMERCIAL

## 2024-11-22 ENCOUNTER — OFFICE VISIT (OUTPATIENT)
Dept: PRIMARY CARE CLINIC | Facility: CLINIC | Age: 44
End: 2024-11-22

## 2024-11-22 VITALS
OXYGEN SATURATION: 99 % | TEMPERATURE: 97.4 F | WEIGHT: 280 LBS | HEIGHT: 71 IN | DIASTOLIC BLOOD PRESSURE: 86 MMHG | BODY MASS INDEX: 39.2 KG/M2 | HEART RATE: 64 BPM | SYSTOLIC BLOOD PRESSURE: 132 MMHG | RESPIRATION RATE: 20 BRPM

## 2024-11-22 DIAGNOSIS — M25.511 CHRONIC PAIN OF BOTH SHOULDERS: ICD-10-CM

## 2024-11-22 DIAGNOSIS — M25.512 CHRONIC PAIN OF BOTH SHOULDERS: ICD-10-CM

## 2024-11-22 DIAGNOSIS — Z00.00 PHYSICAL EXAM: Primary | ICD-10-CM

## 2024-11-22 DIAGNOSIS — F41.9 ANXIETY: ICD-10-CM

## 2024-11-22 DIAGNOSIS — Z11.59 NEED FOR HEPATITIS B SCREENING TEST: ICD-10-CM

## 2024-11-22 DIAGNOSIS — R07.89 PAIN OF STERNUM: ICD-10-CM

## 2024-11-22 DIAGNOSIS — Z23 ENCOUNTER FOR ADMINISTRATION OF VACCINE: ICD-10-CM

## 2024-11-22 DIAGNOSIS — G89.29 CHRONIC PAIN OF BOTH SHOULDERS: ICD-10-CM

## 2024-11-22 DIAGNOSIS — Z12.11 COLON CANCER SCREENING: ICD-10-CM

## 2024-11-22 DIAGNOSIS — G89.29 CHRONIC UPPER BACK PAIN: ICD-10-CM

## 2024-11-22 DIAGNOSIS — R73.02 IGT (IMPAIRED GLUCOSE TOLERANCE): ICD-10-CM

## 2024-11-22 DIAGNOSIS — R53.83 OTHER FATIGUE: ICD-10-CM

## 2024-11-22 DIAGNOSIS — M54.2 NECK PAIN: ICD-10-CM

## 2024-11-22 DIAGNOSIS — R35.0 INCREASED URINARY FREQUENCY: ICD-10-CM

## 2024-11-22 DIAGNOSIS — M54.9 CHRONIC UPPER BACK PAIN: ICD-10-CM

## 2024-11-22 DIAGNOSIS — L30.9 ECZEMA OF BOTH HANDS: ICD-10-CM

## 2024-11-22 DIAGNOSIS — N64.81 PTOSIS OF BOTH BREASTS: ICD-10-CM

## 2024-11-22 PROCEDURE — 71120 X-RAY EXAM BREASTBONE 2/>VWS: CPT

## 2024-11-22 RX ORDER — DICLOFENAC SODIUM 75 MG/1
75 TABLET, DELAYED RELEASE ORAL 2 TIMES DAILY
Qty: 60 TABLET | Refills: 0 | OUTPATIENT
Start: 2024-11-22

## 2024-11-22 RX ORDER — SERTRALINE HYDROCHLORIDE 100 MG/1
100 TABLET, FILM COATED ORAL DAILY
Qty: 90 TABLET | Refills: 1 | OUTPATIENT
Start: 2024-11-22

## 2024-11-22 SDOH — ECONOMIC STABILITY: INCOME INSECURITY: HOW HARD IS IT FOR YOU TO PAY FOR THE VERY BASICS LIKE FOOD, HOUSING, MEDICAL CARE, AND HEATING?: NOT HARD AT ALL

## 2024-11-22 SDOH — ECONOMIC STABILITY: FOOD INSECURITY: WITHIN THE PAST 12 MONTHS, YOU WORRIED THAT YOUR FOOD WOULD RUN OUT BEFORE YOU GOT MONEY TO BUY MORE.: NEVER TRUE

## 2024-11-22 SDOH — ECONOMIC STABILITY: FOOD INSECURITY: WITHIN THE PAST 12 MONTHS, THE FOOD YOU BOUGHT JUST DIDN'T LAST AND YOU DIDN'T HAVE MONEY TO GET MORE.: NEVER TRUE

## 2024-11-22 ASSESSMENT — ENCOUNTER SYMPTOMS
CONSTIPATION: 0
EYE DISCHARGE: 0
CHEST TIGHTNESS: 0
COLOR CHANGE: 0
RHINORRHEA: 0
COUGH: 0
DIARRHEA: 0
SORE THROAT: 0
SHORTNESS OF BREATH: 0
BACK PAIN: 0
ABDOMINAL PAIN: 0

## 2024-11-22 ASSESSMENT — PATIENT HEALTH QUESTIONNAIRE - PHQ9
SUM OF ALL RESPONSES TO PHQ QUESTIONS 1-9: 0
2. FEELING DOWN, DEPRESSED OR HOPELESS: NOT AT ALL
1. LITTLE INTEREST OR PLEASURE IN DOING THINGS: NOT AT ALL
SUM OF ALL RESPONSES TO PHQ QUESTIONS 1-9: 0
SUM OF ALL RESPONSES TO PHQ9 QUESTIONS 1 & 2: 0

## 2024-11-22 NOTE — PROGRESS NOTES
\"Have you been to the ER, urgent care clinic since your last visit?  Hospitalized since your last visit?\"    NO      “Have you seen or consulted any other health care providers outside our system since your last visit?”    Pain specialist       “Have you had a colorectal cancer screening such as a colonoscopy/FIT/Cologuard?    2018   Record has been requested via fax.      Chief Complaint   Patient presents with    Annual Exam     Is having pain sternum to stomach. Is happening more frequently.   Would like a handicap parking pass.    Polyuria       Takes a B12 and apple cider vinegar pill.     Pt is ok with scribe.     Patient provided with most updated VIS prior to administration. Opportunity given for questions and concerns provided. No concerns at this time    Immunizations administered to patient 11/22/2024 by Monique Mallory LPN with consent.Patient tolerated procedure well. No reactions noted.   
°C) (Temporal)   Resp 20   Ht 1.803 m (5' 11\")   Wt 127 kg (280 lb)   SpO2 99%   BMI 39.05 kg/m²     Physical Exam  Vitals and nursing note reviewed.   Constitutional:       General: She is not in acute distress.     Appearance: Normal appearance. She is obese. She is not diaphoretic.   HENT:      Right Ear: Tympanic membrane, ear canal and external ear normal.      Left Ear: Tympanic membrane, ear canal and external ear normal.      Mouth/Throat:      Mouth: Mucous membranes are moist.      Pharynx: Oropharynx is clear.   Eyes:      General:         Right eye: No discharge.         Left eye: No discharge.      Extraocular Movements: Extraocular movements intact.      Conjunctiva/sclera: Conjunctivae normal.   Cardiovascular:      Rate and Rhythm: Normal rate and regular rhythm.      Pulses: Normal pulses.   Pulmonary:      Effort: Pulmonary effort is normal.      Breath sounds: No wheezing.   Abdominal:      General: Bowel sounds are normal. There is no distension.      Palpations: Abdomen is soft.      Tenderness: There is no abdominal tenderness.   Musculoskeletal:      Right lower leg: No edema.      Left lower leg: No edema.   Lymphadenopathy:      Cervical: No cervical adenopathy.   Psychiatric:         Mood and Affect: Mood normal.            Shandra AYERS, am scribing for and in the presence of Corry Chamebrs MD. 11/22/24/2:05 PM EST  Corry AYERS MD, personally performed the services described by my scribe in my presence, and it is both accurate and complete.    An electronic signature was used to authenticate this note.    --Shandra Cui

## 2024-11-25 DIAGNOSIS — R35.0 INCREASED URINARY FREQUENCY: ICD-10-CM

## 2024-11-25 DIAGNOSIS — Z00.00 PHYSICAL EXAM: ICD-10-CM

## 2024-11-25 DIAGNOSIS — Z11.59 NEED FOR HEPATITIS B SCREENING TEST: ICD-10-CM

## 2024-11-25 DIAGNOSIS — R73.02 IGT (IMPAIRED GLUCOSE TOLERANCE): ICD-10-CM

## 2024-11-25 LAB
ALBUMIN SERPL-MCNC: 3.2 G/DL (ref 3.5–5)
ALBUMIN/GLOB SERPL: 1 (ref 1.1–2.2)
ALP SERPL-CCNC: 64 U/L (ref 45–117)
ALT SERPL-CCNC: 11 U/L (ref 12–78)
ANION GAP SERPL CALC-SCNC: 6 MMOL/L (ref 2–12)
APPEARANCE UR: CLEAR
AST SERPL-CCNC: 7 U/L (ref 15–37)
BACTERIA URNS QL MICRO: NEGATIVE /HPF
BILIRUB SERPL-MCNC: 0.5 MG/DL (ref 0.2–1)
BILIRUB UR QL: NEGATIVE
BUN SERPL-MCNC: 12 MG/DL (ref 6–20)
BUN/CREAT SERPL: 16 (ref 12–20)
CALCIUM SERPL-MCNC: 8.6 MG/DL (ref 8.5–10.1)
CHLORIDE SERPL-SCNC: 108 MMOL/L (ref 97–108)
CHOLEST SERPL-MCNC: 181 MG/DL
CO2 SERPL-SCNC: 26 MMOL/L (ref 21–32)
COLOR UR: NORMAL
CREAT SERPL-MCNC: 0.76 MG/DL (ref 0.55–1.02)
EPITH CASTS URNS QL MICRO: NORMAL /LPF
ERYTHROCYTE [DISTWIDTH] IN BLOOD BY AUTOMATED COUNT: 14.2 % (ref 11.5–14.5)
EST. AVERAGE GLUCOSE BLD GHB EST-MCNC: 126 MG/DL
GLOBULIN SER CALC-MCNC: 3.1 G/DL (ref 2–4)
GLUCOSE SERPL-MCNC: 90 MG/DL (ref 65–100)
GLUCOSE UR STRIP.AUTO-MCNC: NEGATIVE MG/DL
HBA1C MFR BLD: 6 % (ref 4–5.6)
HBV SURFACE AB SER QL: NONREACTIVE
HBV SURFACE AB SER-ACNC: <3.1 MIU/ML
HCT VFR BLD AUTO: 36.8 % (ref 35–47)
HDLC SERPL-MCNC: 46 MG/DL
HDLC SERPL: 3.9 (ref 0–5)
HGB BLD-MCNC: 11.6 G/DL (ref 11.5–16)
HGB UR QL STRIP: NEGATIVE
HYALINE CASTS URNS QL MICRO: NORMAL /LPF (ref 0–5)
KETONES UR QL STRIP.AUTO: NEGATIVE MG/DL
LDLC SERPL CALC-MCNC: 111.8 MG/DL (ref 0–100)
LEUKOCYTE ESTERASE UR QL STRIP.AUTO: NEGATIVE
MCH RBC QN AUTO: 27.2 PG (ref 26–34)
MCHC RBC AUTO-ENTMCNC: 31.5 G/DL (ref 30–36.5)
MCV RBC AUTO: 86.2 FL (ref 80–99)
NITRITE UR QL STRIP.AUTO: NEGATIVE
NRBC # BLD: 0 K/UL (ref 0–0.01)
NRBC BLD-RTO: 0 PER 100 WBC
PH UR STRIP: 6.5 (ref 5–8)
PLATELET # BLD AUTO: 289 K/UL (ref 150–400)
PMV BLD AUTO: 11.7 FL (ref 8.9–12.9)
POTASSIUM SERPL-SCNC: 4 MMOL/L (ref 3.5–5.1)
PROT SERPL-MCNC: 6.3 G/DL (ref 6.4–8.2)
PROT UR STRIP-MCNC: NEGATIVE MG/DL
RBC # BLD AUTO: 4.27 M/UL (ref 3.8–5.2)
RBC #/AREA URNS HPF: NORMAL /HPF (ref 0–5)
SODIUM SERPL-SCNC: 140 MMOL/L (ref 136–145)
SP GR UR REFRACTOMETRY: 1.01 (ref 1–1.03)
SPECIMEN HOLD: NORMAL
TRIGL SERPL-MCNC: 116 MG/DL
TSH SERPL DL<=0.05 MIU/L-ACNC: 2.58 UIU/ML (ref 0.36–3.74)
UROBILINOGEN UR QL STRIP.AUTO: 0.2 EU/DL (ref 0.2–1)
VLDLC SERPL CALC-MCNC: 23.2 MG/DL
WBC # BLD AUTO: 7.2 K/UL (ref 3.6–11)
WBC URNS QL MICRO: NORMAL /HPF (ref 0–4)

## 2024-11-27 RX ORDER — DICLOFENAC SODIUM 75 MG/1
75 TABLET, DELAYED RELEASE ORAL 2 TIMES DAILY
Qty: 60 TABLET | OUTPATIENT
Start: 2024-11-27

## 2024-11-30 ENCOUNTER — PATIENT MESSAGE (OUTPATIENT)
Dept: PRIMARY CARE CLINIC | Facility: CLINIC | Age: 44
End: 2024-11-30

## 2024-12-02 RX ORDER — DICLOFENAC SODIUM 75 MG/1
75 TABLET, DELAYED RELEASE ORAL 2 TIMES DAILY
Qty: 60 TABLET | Refills: 0 | Status: SHIPPED | OUTPATIENT
Start: 2024-12-02

## 2024-12-05 LAB — HEMOCCULT STL QL IA: NEGATIVE

## 2024-12-29 RX ORDER — DICLOFENAC SODIUM 75 MG/1
75 TABLET, DELAYED RELEASE ORAL 2 TIMES DAILY
Qty: 60 TABLET | Refills: 0 | Status: SHIPPED | OUTPATIENT
Start: 2024-12-29

## 2025-01-21 SDOH — ECONOMIC STABILITY: FOOD INSECURITY: WITHIN THE PAST 12 MONTHS, YOU WORRIED THAT YOUR FOOD WOULD RUN OUT BEFORE YOU GOT MONEY TO BUY MORE.: NEVER TRUE

## 2025-01-21 SDOH — ECONOMIC STABILITY: INCOME INSECURITY: IN THE LAST 12 MONTHS, WAS THERE A TIME WHEN YOU WERE NOT ABLE TO PAY THE MORTGAGE OR RENT ON TIME?: NO

## 2025-01-21 SDOH — ECONOMIC STABILITY: FOOD INSECURITY: WITHIN THE PAST 12 MONTHS, THE FOOD YOU BOUGHT JUST DIDN'T LAST AND YOU DIDN'T HAVE MONEY TO GET MORE.: NEVER TRUE

## 2025-01-21 SDOH — ECONOMIC STABILITY: TRANSPORTATION INSECURITY
IN THE PAST 12 MONTHS, HAS THE LACK OF TRANSPORTATION KEPT YOU FROM MEDICAL APPOINTMENTS OR FROM GETTING MEDICATIONS?: NO

## 2025-01-22 DIAGNOSIS — F41.9 ANXIETY: ICD-10-CM

## 2025-01-22 RX ORDER — SERTRALINE HYDROCHLORIDE 100 MG/1
100 TABLET, FILM COATED ORAL DAILY
Qty: 90 TABLET | Refills: 1 | Status: SHIPPED | OUTPATIENT
Start: 2025-01-22

## 2025-01-24 ENCOUNTER — OFFICE VISIT (OUTPATIENT)
Dept: PRIMARY CARE CLINIC | Facility: CLINIC | Age: 45
End: 2025-01-24
Payer: COMMERCIAL

## 2025-01-24 VITALS
HEART RATE: 61 BPM | OXYGEN SATURATION: 99 % | WEIGHT: 288.6 LBS | RESPIRATION RATE: 16 BRPM | DIASTOLIC BLOOD PRESSURE: 74 MMHG | HEIGHT: 71 IN | TEMPERATURE: 97.5 F | BODY MASS INDEX: 40.4 KG/M2 | SYSTOLIC BLOOD PRESSURE: 131 MMHG

## 2025-01-24 DIAGNOSIS — R73.02 IGT (IMPAIRED GLUCOSE TOLERANCE): ICD-10-CM

## 2025-01-24 DIAGNOSIS — E66.01 MORBIDLY OBESE: Primary | ICD-10-CM

## 2025-01-24 DIAGNOSIS — M54.9 UPPER BACK PAIN: ICD-10-CM

## 2025-01-24 DIAGNOSIS — Z23 NEED FOR HEPATITIS B BOOSTER VACCINATION: ICD-10-CM

## 2025-01-24 DIAGNOSIS — F41.9 ANXIETY: ICD-10-CM

## 2025-01-24 LAB — HBA1C MFR BLD: 6.2 %

## 2025-01-24 PROCEDURE — 83036 HEMOGLOBIN GLYCOSYLATED A1C: CPT | Performed by: INTERNAL MEDICINE

## 2025-01-24 PROCEDURE — 90471 IMMUNIZATION ADMIN: CPT | Performed by: INTERNAL MEDICINE

## 2025-01-24 PROCEDURE — 99214 OFFICE O/P EST MOD 30 MIN: CPT | Performed by: INTERNAL MEDICINE

## 2025-01-24 PROCEDURE — 90739 HEPB VACC 2/4 DOSE ADULT IM: CPT | Performed by: INTERNAL MEDICINE

## 2025-01-24 ASSESSMENT — ENCOUNTER SYMPTOMS
EYE DISCHARGE: 0
ABDOMINAL PAIN: 0
CONSTIPATION: 0
CHEST TIGHTNESS: 0
BACK PAIN: 1
DIARRHEA: 0
SORE THROAT: 0
COUGH: 0
SHORTNESS OF BREATH: 0
COLOR CHANGE: 0
RHINORRHEA: 0

## 2025-01-24 ASSESSMENT — PATIENT HEALTH QUESTIONNAIRE - PHQ9
SUM OF ALL RESPONSES TO PHQ9 QUESTIONS 1 & 2: 0
5. POOR APPETITE OR OVEREATING: NOT AT ALL
2. FEELING DOWN, DEPRESSED OR HOPELESS: NOT AT ALL
SUM OF ALL RESPONSES TO PHQ QUESTIONS 1-9: 0
SUM OF ALL RESPONSES TO PHQ QUESTIONS 1-9: 0
6. FEELING BAD ABOUT YOURSELF - OR THAT YOU ARE A FAILURE OR HAVE LET YOURSELF OR YOUR FAMILY DOWN: NOT AT ALL
SUM OF ALL RESPONSES TO PHQ QUESTIONS 1-9: 0
1. LITTLE INTEREST OR PLEASURE IN DOING THINGS: NOT AT ALL
3. TROUBLE FALLING OR STAYING ASLEEP: NOT AT ALL
7. TROUBLE CONCENTRATING ON THINGS, SUCH AS READING THE NEWSPAPER OR WATCHING TELEVISION: NOT AT ALL
4. FEELING TIRED OR HAVING LITTLE ENERGY: NOT AT ALL
SUM OF ALL RESPONSES TO PHQ QUESTIONS 1-9: 0
8. MOVING OR SPEAKING SO SLOWLY THAT OTHER PEOPLE COULD HAVE NOTICED. OR THE OPPOSITE, BEING SO FIGETY OR RESTLESS THAT YOU HAVE BEEN MOVING AROUND A LOT MORE THAN USUAL: NOT AT ALL
9. THOUGHTS THAT YOU WOULD BE BETTER OFF DEAD, OR OF HURTING YOURSELF: NOT AT ALL

## 2025-01-24 NOTE — PROGRESS NOTES
Health Decision Maker has been checked with the patient      Patient has stated that the scribe can come in room    Chief Complaint   Patient presents with    Weight Management       \"Have you been to the ER, urgent care clinic since your last visit?  Hospitalized since your last visit?\"    NO    “Have you seen or consulted any other health care providers outside of [facility] since your last visit?”    YES - When: approximately 1 months ago.  Where and Why: Dermatologist.      Vitals:    01/24/25 1422   BP: 131/74   Site: Left Upper Arm   Position: Sitting   Pulse: 61   Resp: 16   Temp: 97.5 °F (36.4 °C)   SpO2: 99%   Weight: 130.9 kg (288 lb 9.6 oz)   Height: 1.803 m (5' 11\")       Click Here for Release of Records Request      Chart reviewed: immunizations are documented.   Immunization History   Administered Date(s) Administered    COVID-19, PFIZER PURPLE top, DILUTE for use, (age 12 y+), 30mcg/0.3mL 03/24/2021, 04/16/2021, 12/17/2021    Influenza, FLUARIX, FLULAVAL, FLUZONE (age 6 mo+) and AFLURIA, (age 3 y+), Quadv PF, 0.5mL 12/06/2016, 10/26/2017, 11/05/2021, 09/23/2022    Influenza, FLUCELVAX, (age 6 mo+) IM, Trivalent PF, 0.5mL 11/22/2024    TDaP, ADACEL (age 10y-64y), BOOSTRIX (age 10y+), IM, 0.5mL 08/30/2016      
11/25/2024 7.2  3.6 - 11.0 K/uL Final    RBC 11/25/2024 4.27  3.80 - 5.20 M/uL Final    Hemoglobin 11/25/2024 11.6  11.5 - 16.0 g/dL Final    Hematocrit 11/25/2024 36.8  35.0 - 47.0 % Final    MCV 11/25/2024 86.2  80.0 - 99.0 FL Final    MCH 11/25/2024 27.2  26.0 - 34.0 PG Final    MCHC 11/25/2024 31.5  30.0 - 36.5 g/dL Final    RDW 11/25/2024 14.2  11.5 - 14.5 % Final    Platelets 11/25/2024 289  150 - 400 K/uL Final    MPV 11/25/2024 11.7  8.9 - 12.9 FL Final    Nucleated RBCs 11/25/2024 0.0  0  WBC Final    nRBC 11/25/2024 0.00  0.00 - 0.01 K/uL Final    Hemoglobin A1C 11/25/2024 6.0 (H)  4.0 - 5.6 % Final    Comment: (NOTE)  HbA1C Interpretive Ranges  <5.7              Normal  5.7 - 6.4         Consider Prediabetes  >6.5              Consider Diabetes      Estimated Avg Glucose 11/25/2024 126  mg/dL Final    Cholesterol, Total 11/25/2024 181  <200 MG/DL Final    Triglycerides 11/25/2024 116  <150 MG/DL Final    Based on NCEP-ATP III:  Triglycerides <150 mg/dL  is considered normal, 150-199 mg/dL  borderline high,  200-499 mg/dL high and  greater than or equal to 500 mg/dL very high.    HDL 11/25/2024 46  MG/DL Final    Based on NCEP ATP III, HDL Cholesterol <40 mg/dL is considered low and >60 mg/dL is elevated.    LDL Cholesterol 11/25/2024 111.8 (H)  0 - 100 MG/DL Final    Comment: Based on the NCEP-ATP: LDL-C concentrations are considered  optimal <100 mg/dL,  near optimal/above Normal 100-129 mg/dL  Borderline High: 130-159, High: 160-189 mg/dL  Very High: Greater than or equal to 190 mg/dL      VLDL Cholesterol Calculated 11/25/2024 23.2  MG/DL Final    Chol/HDL Ratio 11/25/2024 3.9  0.0 - 5.0   Final    TSH, 3rd Generation 11/25/2024 2.58  0.36 - 3.74 uIU/mL Final    Comment:   Due to TSH heterogeneity, both structurally and degree of glycosylation, monoclonal antibodies used in the TSH assay may not accurately quantitate TSH. Therefore, this result should be correlated with clinical findings as well

## 2025-01-30 RX ORDER — DICLOFENAC SODIUM 75 MG/1
75 TABLET, DELAYED RELEASE ORAL 2 TIMES DAILY
Qty: 60 TABLET | Refills: 0 | Status: SHIPPED | OUTPATIENT
Start: 2025-01-30

## 2025-03-13 ENCOUNTER — OFFICE VISIT (OUTPATIENT)
Dept: PRIMARY CARE CLINIC | Facility: CLINIC | Age: 45
End: 2025-03-13
Payer: COMMERCIAL

## 2025-03-13 VITALS
TEMPERATURE: 97.5 F | BODY MASS INDEX: 41.02 KG/M2 | SYSTOLIC BLOOD PRESSURE: 123 MMHG | HEIGHT: 71 IN | RESPIRATION RATE: 16 BRPM | WEIGHT: 293 LBS | OXYGEN SATURATION: 99 % | HEART RATE: 62 BPM | DIASTOLIC BLOOD PRESSURE: 70 MMHG

## 2025-03-13 DIAGNOSIS — M17.0 PRIMARY OSTEOARTHRITIS OF BOTH KNEES: ICD-10-CM

## 2025-03-13 DIAGNOSIS — L30.1: ICD-10-CM

## 2025-03-13 DIAGNOSIS — K58.0 IRRITABLE BOWEL SYNDROME WITH DIARRHEA: ICD-10-CM

## 2025-03-13 DIAGNOSIS — F41.9 ANXIETY: ICD-10-CM

## 2025-03-13 DIAGNOSIS — E66.01 MORBIDLY OBESE: Primary | ICD-10-CM

## 2025-03-13 DIAGNOSIS — E78.2 COMBINED HYPERLIPIDEMIA: ICD-10-CM

## 2025-03-13 DIAGNOSIS — Z91.09 ENVIRONMENTAL ALLERGIES: ICD-10-CM

## 2025-03-13 PROCEDURE — 99214 OFFICE O/P EST MOD 30 MIN: CPT | Performed by: INTERNAL MEDICINE

## 2025-03-13 RX ORDER — PHENTERMINE HYDROCHLORIDE 15 MG/1
15 CAPSULE ORAL EVERY MORNING
Qty: 30 CAPSULE | Refills: 0 | Status: SHIPPED | OUTPATIENT
Start: 2025-03-13 | End: 2025-04-12

## 2025-03-13 ASSESSMENT — PATIENT HEALTH QUESTIONNAIRE - PHQ9
2. FEELING DOWN, DEPRESSED OR HOPELESS: NOT AT ALL
SUM OF ALL RESPONSES TO PHQ QUESTIONS 1-9: 0
1. LITTLE INTEREST OR PLEASURE IN DOING THINGS: NOT AT ALL
SUM OF ALL RESPONSES TO PHQ QUESTIONS 1-9: 0

## 2025-04-08 RX ORDER — DICLOFENAC SODIUM 75 MG/1
75 TABLET, DELAYED RELEASE ORAL 2 TIMES DAILY
Qty: 60 TABLET | Refills: 0 | Status: SHIPPED | OUTPATIENT
Start: 2025-04-08

## 2025-04-17 ENCOUNTER — OFFICE VISIT (OUTPATIENT)
Dept: PRIMARY CARE CLINIC | Facility: CLINIC | Age: 45
End: 2025-04-17
Payer: COMMERCIAL

## 2025-04-17 VITALS
OXYGEN SATURATION: 99 % | WEIGHT: 291.8 LBS | HEART RATE: 59 BPM | RESPIRATION RATE: 16 BRPM | SYSTOLIC BLOOD PRESSURE: 135 MMHG | DIASTOLIC BLOOD PRESSURE: 81 MMHG | BODY MASS INDEX: 40.85 KG/M2 | HEIGHT: 71 IN | TEMPERATURE: 97.5 F

## 2025-04-17 DIAGNOSIS — E66.01 MORBIDLY OBESE: Primary | ICD-10-CM

## 2025-04-17 DIAGNOSIS — M17.0 PRIMARY OSTEOARTHRITIS OF BOTH KNEES: ICD-10-CM

## 2025-04-17 DIAGNOSIS — F41.9 ANXIETY AND DEPRESSION: ICD-10-CM

## 2025-04-17 DIAGNOSIS — Z79.899 MEDICATION MANAGEMENT: ICD-10-CM

## 2025-04-17 DIAGNOSIS — F32.A ANXIETY AND DEPRESSION: ICD-10-CM

## 2025-04-17 PROCEDURE — 93000 ELECTROCARDIOGRAM COMPLETE: CPT | Performed by: INTERNAL MEDICINE

## 2025-04-17 PROCEDURE — 99214 OFFICE O/P EST MOD 30 MIN: CPT | Performed by: INTERNAL MEDICINE

## 2025-04-17 RX ORDER — PHENTERMINE HYDROCHLORIDE 37.5 MG/1
37.5 TABLET ORAL
Qty: 30 TABLET | Refills: 0 | Status: SHIPPED | OUTPATIENT
Start: 2025-04-17 | End: 2025-05-17

## 2025-04-17 NOTE — PROGRESS NOTES
Weight- 291.8  Body Fat- 45.2  BMI- 40.5  Water % BMI- 41.8  Resting energy expenditure- 2046    Health Decision Maker has been checked with the patient   Primary Decision Maker: Tavo Agudelo - Spouse - [phone]     Patient has stated that the scribe can come in room    Chief Complaint   Patient presents with    Weight Management       \"Have you been to the ER, urgent care clinic since your last visit?  Hospitalized since your last visit?\"    NO    “Have you seen or consulted any other health care providers outside of [facility] since your last visit?”    NO      Vitals:    04/17/25 0938   BP: 135/81   BP Site: Right Upper Arm   Patient Position: Sitting   BP Cuff Size: Large Adult   Pulse: 59   Resp: 16   Temp: 97.5 °F (36.4 °C)   SpO2: 99%   Weight: 132.4 kg (291 lb 12.8 oz)   Height: 1.803 m (5' 11\")      Depression: Not at risk (3/13/2025)    PHQ-2     PHQ-2 Score: 0              Click Here for Release of Records Request    Chart reviewed: immunizations are documented.   Immunization History   Administered Date(s) Administered    COVID-19, PFIZER PURPLE top, DILUTE for use, (age 12 y+), 30mcg/0.3mL 03/24/2021, 04/16/2021, 12/17/2021    Hep B, HEPLISAV-B, (age 18y+), IM, 0.5mL 01/24/2025    Influenza, FLUARIX, FLULAVAL, FLUZONE (age 6 mo+) and AFLURIA, (age 3 y+), Quadv PF, 0.5mL 12/06/2016, 10/26/2017, 11/05/2021, 09/23/2022    Influenza, FLUCELVAX, (age 6 mo+) IM, Trivalent PF, 0.5mL 11/22/2024    TDaP, ADACEL (age 10y-64y), BOOSTRIX (age 10y+), IM, 0.5mL 08/30/2016

## 2025-04-17 NOTE — PROGRESS NOTES
[facility]   Weight Loss Program     FOLLOW-UP PHYSICIAN VISIT    HISTORY OF PRESENT ILLNESS    Carolyn Agudelo is a 45 y.o. female with Morbid Obesity Body mass index is 40.7 kg/m². and associated health concerns presents for follow-up of weight management.     She has lost 2 pounds since her last appointment on 3/13/2025.    She takes Phentermine 15 mg daily. She found it initially effective in curbing her appetite, but notes that during the last few doses it was not as effective. She suspects that she developed a tolerance to the current dosage. However, she reports that she would need to take the medication early in the morning (7-8AM) or it would disturb her sleep.     She has been avoiding eating after 7PM.    BP is well-controlled in office today at 135/81. She completed an EKG today, which was normal.    She takes Zoloft 100 mg daily for anxiety. She has a history of a manic episode several years ago.  She has been denied Wellbutrin by her insurance in the past.     She has regular bowel movements, often multiple per day.  She still endorses clicking and occasional aches in her knees.    WEIGHT HISTORY    Current weight 132.4 kg (291 lb 12.8 oz)    Weight loss since previous visit -2 lb. Total weight loss 2 lb. Short term goal weight 265 lb. Long term goal weight 200 lb.    Neck circumference N/A in.   Waist circumference N/A in.   Body fat 45.2%.   Current BMI Body mass index is 40.7 kg/m².      CURRENT HABITS    Eating/Drinking Habits:  Meals per day/snacking: Premier protein shakes for breakfast. Coffee with creamer. Lunch: sandwich with wheat bread. Soup. Pasta.  Limiting snacking. Snacking now consists of cucumbers/pickles. Dinner: Salads with vinaigrette, sometimes with eggs. Ground beef lisbet with lettuce & tomatoes.   Drinking Habits: >120 oz of water daily.    Sleep Habits: Sleeps well, but disturbed if Phentermine taken too late in the morning.     Physical Activity: Walks

## 2025-05-06 RX ORDER — DICLOFENAC SODIUM 75 MG/1
75 TABLET, DELAYED RELEASE ORAL 2 TIMES DAILY
Qty: 60 TABLET | Refills: 0 | Status: SHIPPED | OUTPATIENT
Start: 2025-05-06

## 2025-05-22 ENCOUNTER — OFFICE VISIT (OUTPATIENT)
Dept: PRIMARY CARE CLINIC | Facility: CLINIC | Age: 45
End: 2025-05-22
Payer: COMMERCIAL

## 2025-05-22 VITALS
TEMPERATURE: 97 F | SYSTOLIC BLOOD PRESSURE: 125 MMHG | HEIGHT: 71 IN | HEART RATE: 67 BPM | WEIGHT: 288.7 LBS | OXYGEN SATURATION: 98 % | DIASTOLIC BLOOD PRESSURE: 74 MMHG | BODY MASS INDEX: 40.42 KG/M2 | RESPIRATION RATE: 18 BRPM

## 2025-05-22 DIAGNOSIS — E66.01 MORBIDLY OBESE (HCC): Primary | ICD-10-CM

## 2025-05-22 DIAGNOSIS — F41.9 ANXIETY AND DEPRESSION: ICD-10-CM

## 2025-05-22 DIAGNOSIS — M17.0 PRIMARY OSTEOARTHRITIS OF BOTH KNEES: ICD-10-CM

## 2025-05-22 DIAGNOSIS — F32.A ANXIETY AND DEPRESSION: ICD-10-CM

## 2025-05-22 PROCEDURE — 99214 OFFICE O/P EST MOD 30 MIN: CPT | Performed by: INTERNAL MEDICINE

## 2025-05-22 RX ORDER — PHENTERMINE HYDROCHLORIDE 37.5 MG/1
37.5 TABLET ORAL
Qty: 30 TABLET | Refills: 0 | Status: SHIPPED | OUTPATIENT
Start: 2025-05-22 | End: 2025-06-21

## 2025-05-22 ASSESSMENT — PATIENT HEALTH QUESTIONNAIRE - PHQ9
SUM OF ALL RESPONSES TO PHQ QUESTIONS 1-9: 0
2. FEELING DOWN, DEPRESSED OR HOPELESS: NOT AT ALL
SUM OF ALL RESPONSES TO PHQ QUESTIONS 1-9: 0
1. LITTLE INTEREST OR PLEASURE IN DOING THINGS: NOT AT ALL
SUM OF ALL RESPONSES TO PHQ QUESTIONS 1-9: 0
SUM OF ALL RESPONSES TO PHQ QUESTIONS 1-9: 0

## 2025-05-22 NOTE — PROGRESS NOTES
Have you been to the ER, urgent care clinic since your last visit?  Hospitalized since your last visit?   NO      Have you seen or consulted any other health care providers outside our system since your last visit?   NO      Chief Complaint   Patient presents with    Weight Management       Water % BMI- 42.0  Resting energy expenditure- 2032  
   Hypertension Father     Lung Cancer Father         and THROAT CANCER    Stroke Father         multiple    Alcohol Abuse Father     Cancer Father     Hearing Loss Father     High Blood Pressure Father     Prostate Cancer Brother     Diabetes Brother     Hypertension Brother     Sleep Apnea Brother     Obesity Brother     Cancer Brother     Diabetes Brother     Hypertension Brother     Obesity Brother     Sleep Apnea Brother     Breast Cancer Paternal Aunt     Alzheimer's Disease Paternal Grandmother     Dementia Maternal Grandmother     High Cholesterol Maternal Grandmother     Diabetes Maternal Grandmother     Heart Attack Maternal Grandmother 42    Obesity Mother         Txd w GBP    Heart Surgery Mother         for tachycardia.  txd w Ablation    High Cholesterol Mother     Hypertension Mother     Diabetes Mother     Thyroid Disease Mother 60        hypothyroid    Anemia Mother     Arthritis Mother     Arrhythmia Mother     Cancer Mother         Lung cancer stage 4    Clotting Disorder Mother     Heart Disease Mother     High Blood Pressure Mother     Hypertension Paternal Grandfather     Other Maternal Grandfather 30        mva    Kidney Disease Paternal Aunt        SOCIAL HISTORY:    Social History     Socioeconomic History    Marital status:    Tobacco Use    Smoking status: Former     Current packs/day: 0.00     Average packs/day: 0.5 packs/day for 15.0 years (7.5 ttl pk-yrs)     Types: Cigarettes     Quit date: 2024     Years since quittin.6    Smokeless tobacco: Never    Tobacco comments:     recently quit   Vaping Use    Vaping status: Never Used   Substance and Sexual Activity    Alcohol use: Not Currently     Alcohol/week: 1.0 standard drink of alcohol     Comment: Rarely    Drug use: No    Sexual activity: Yes     Partners: Male   Social History Narrative    ** Merged History Encounter **          Social Drivers of Health     Financial Resource Strain: Low Risk  (2024)    Overall

## 2025-06-14 RX ORDER — DICLOFENAC SODIUM 75 MG/1
75 TABLET, DELAYED RELEASE ORAL 2 TIMES DAILY
Qty: 60 TABLET | Refills: 0 | Status: SHIPPED | OUTPATIENT
Start: 2025-06-14

## 2025-06-22 ENCOUNTER — PATIENT MESSAGE (OUTPATIENT)
Dept: PRIMARY CARE CLINIC | Facility: CLINIC | Age: 45
End: 2025-06-22

## 2025-06-22 DIAGNOSIS — F41.9 ANXIETY: ICD-10-CM

## 2025-06-23 RX ORDER — SERTRALINE HYDROCHLORIDE 100 MG/1
100 TABLET, FILM COATED ORAL DAILY
Qty: 90 TABLET | Refills: 0 | Status: SHIPPED | OUTPATIENT
Start: 2025-06-23 | End: 2025-09-21

## 2025-07-11 RX ORDER — DICLOFENAC SODIUM 75 MG/1
75 TABLET, DELAYED RELEASE ORAL 2 TIMES DAILY
Qty: 60 TABLET | Refills: 0 | Status: SHIPPED | OUTPATIENT
Start: 2025-07-11

## 2025-08-04 ENCOUNTER — OFFICE VISIT (OUTPATIENT)
Dept: PRIMARY CARE CLINIC | Facility: CLINIC | Age: 45
End: 2025-08-04
Payer: COMMERCIAL

## 2025-08-04 VITALS
BODY MASS INDEX: 40.52 KG/M2 | HEART RATE: 66 BPM | DIASTOLIC BLOOD PRESSURE: 82 MMHG | SYSTOLIC BLOOD PRESSURE: 136 MMHG | TEMPERATURE: 97.1 F | OXYGEN SATURATION: 99 % | HEIGHT: 71 IN | WEIGHT: 289.4 LBS | RESPIRATION RATE: 16 BRPM

## 2025-08-04 DIAGNOSIS — E66.01 MORBIDLY OBESE (HCC): ICD-10-CM

## 2025-08-04 DIAGNOSIS — F43.20 GRIEF REACTION: ICD-10-CM

## 2025-08-04 DIAGNOSIS — F32.A ANXIETY AND DEPRESSION: ICD-10-CM

## 2025-08-04 DIAGNOSIS — F41.9 ANXIETY AND DEPRESSION: ICD-10-CM

## 2025-08-04 DIAGNOSIS — I10 PRIMARY HYPERTENSION: Primary | ICD-10-CM

## 2025-08-04 PROCEDURE — G8417 CALC BMI ABV UP PARAM F/U: HCPCS | Performed by: INTERNAL MEDICINE

## 2025-08-04 PROCEDURE — 99214 OFFICE O/P EST MOD 30 MIN: CPT | Performed by: INTERNAL MEDICINE

## 2025-08-04 PROCEDURE — 1036F TOBACCO NON-USER: CPT | Performed by: INTERNAL MEDICINE

## 2025-08-04 PROCEDURE — 3079F DIAST BP 80-89 MM HG: CPT | Performed by: INTERNAL MEDICINE

## 2025-08-04 PROCEDURE — 3075F SYST BP GE 130 - 139MM HG: CPT | Performed by: INTERNAL MEDICINE

## 2025-08-04 PROCEDURE — G8427 DOCREV CUR MEDS BY ELIG CLIN: HCPCS | Performed by: INTERNAL MEDICINE

## 2025-08-04 RX ORDER — NALTREXONE HYDROCHLORIDE 50 MG/1
50 TABLET, FILM COATED ORAL DAILY
Qty: 30 TABLET | Refills: 0 | Status: SHIPPED | OUTPATIENT
Start: 2025-08-04

## 2025-08-04 SDOH — ECONOMIC STABILITY: FOOD INSECURITY: WITHIN THE PAST 12 MONTHS, THE FOOD YOU BOUGHT JUST DIDN'T LAST AND YOU DIDN'T HAVE MONEY TO GET MORE.: NEVER TRUE

## 2025-08-04 SDOH — ECONOMIC STABILITY: FOOD INSECURITY: WITHIN THE PAST 12 MONTHS, YOU WORRIED THAT YOUR FOOD WOULD RUN OUT BEFORE YOU GOT MONEY TO BUY MORE.: NEVER TRUE

## 2025-08-04 SDOH — ECONOMIC STABILITY: INCOME INSECURITY: IN THE LAST 12 MONTHS, WAS THERE A TIME WHEN YOU WERE NOT ABLE TO PAY THE MORTGAGE OR RENT ON TIME?: NO

## 2025-08-04 ASSESSMENT — PATIENT HEALTH QUESTIONNAIRE - PHQ9
1. LITTLE INTEREST OR PLEASURE IN DOING THINGS: NOT AT ALL
2. FEELING DOWN, DEPRESSED OR HOPELESS: SEVERAL DAYS
SUM OF ALL RESPONSES TO PHQ QUESTIONS 1-9: 1

## 2025-08-04 ASSESSMENT — ENCOUNTER SYMPTOMS
CHEST TIGHTNESS: 0
EYE DISCHARGE: 0
BACK PAIN: 0
ABDOMINAL PAIN: 0
SORE THROAT: 0
SHORTNESS OF BREATH: 0
CONSTIPATION: 0
RHINORRHEA: 0
COLOR CHANGE: 0
DIARRHEA: 0
COUGH: 0

## 2025-09-02 ENCOUNTER — OFFICE VISIT (OUTPATIENT)
Dept: PRIMARY CARE CLINIC | Facility: CLINIC | Age: 45
End: 2025-09-02
Payer: COMMERCIAL

## 2025-09-02 VITALS
HEART RATE: 68 BPM | DIASTOLIC BLOOD PRESSURE: 80 MMHG | RESPIRATION RATE: 20 BRPM | BODY MASS INDEX: 41.02 KG/M2 | HEIGHT: 71 IN | WEIGHT: 293 LBS | TEMPERATURE: 97 F | SYSTOLIC BLOOD PRESSURE: 128 MMHG | OXYGEN SATURATION: 97 %

## 2025-09-02 DIAGNOSIS — F51.01 PRIMARY INSOMNIA: Primary | ICD-10-CM

## 2025-09-02 DIAGNOSIS — E66.01 OBESITY, MORBID (HCC): ICD-10-CM

## 2025-09-02 DIAGNOSIS — F32.A ANXIETY AND DEPRESSION: ICD-10-CM

## 2025-09-02 DIAGNOSIS — Z23 NEED FOR HEPATITIS B BOOSTER VACCINATION: ICD-10-CM

## 2025-09-02 DIAGNOSIS — F41.9 ANXIETY AND DEPRESSION: ICD-10-CM

## 2025-09-02 PROCEDURE — 90471 IMMUNIZATION ADMIN: CPT | Performed by: INTERNAL MEDICINE

## 2025-09-02 PROCEDURE — 99214 OFFICE O/P EST MOD 30 MIN: CPT | Performed by: INTERNAL MEDICINE

## 2025-09-02 PROCEDURE — 1036F TOBACCO NON-USER: CPT | Performed by: INTERNAL MEDICINE

## 2025-09-02 PROCEDURE — 90739 HEPB VACC 2/4 DOSE ADULT IM: CPT | Performed by: INTERNAL MEDICINE

## 2025-09-02 PROCEDURE — G8417 CALC BMI ABV UP PARAM F/U: HCPCS | Performed by: INTERNAL MEDICINE

## 2025-09-02 PROCEDURE — G8427 DOCREV CUR MEDS BY ELIG CLIN: HCPCS | Performed by: INTERNAL MEDICINE

## 2025-09-02 RX ORDER — TRAZODONE HYDROCHLORIDE 50 MG/1
50 TABLET ORAL NIGHTLY
Qty: 90 TABLET | Refills: 0 | Status: SHIPPED | OUTPATIENT
Start: 2025-09-02

## 2025-09-02 ASSESSMENT — PATIENT HEALTH QUESTIONNAIRE - PHQ9
SUM OF ALL RESPONSES TO PHQ QUESTIONS 1-9: 1
2. FEELING DOWN, DEPRESSED OR HOPELESS: SEVERAL DAYS
SUM OF ALL RESPONSES TO PHQ QUESTIONS 1-9: 1
1. LITTLE INTEREST OR PLEASURE IN DOING THINGS: NOT AT ALL
SUM OF ALL RESPONSES TO PHQ QUESTIONS 1-9: 1
SUM OF ALL RESPONSES TO PHQ QUESTIONS 1-9: 1

## 2025-09-02 ASSESSMENT — ENCOUNTER SYMPTOMS
CHEST TIGHTNESS: 0
EYE DISCHARGE: 0
SORE THROAT: 0
COLOR CHANGE: 0
SHORTNESS OF BREATH: 0
COUGH: 0
RHINORRHEA: 0
ABDOMINAL PAIN: 0
CONSTIPATION: 0
DIARRHEA: 0